# Patient Record
Sex: MALE | Race: OTHER | NOT HISPANIC OR LATINO | Employment: UNEMPLOYED | URBAN - METROPOLITAN AREA
[De-identification: names, ages, dates, MRNs, and addresses within clinical notes are randomized per-mention and may not be internally consistent; named-entity substitution may affect disease eponyms.]

---

## 2024-03-28 LAB
CHOLEST SERPL-MSCNC: 128 MG/DL (ref 0–200)
COMPLEXED PSA SERPL-MCNC: 0.7 NG/ML (ref 0–4)
HDLC SERPL-MCNC: 29 MG/DL (ref 35–70)
LDLC SERPL CALC-MCNC: 81 MG/DL (ref 0–160)
TRIGL SERPL-MCNC: 92 MG/DL (ref 40–160)

## 2024-05-06 ENCOUNTER — HOSPITAL ENCOUNTER (INPATIENT)
Facility: HOSPITAL | Age: 54
LOS: 9 days | Discharge: HOME | DRG: 843 | End: 2024-05-15
Attending: EMERGENCY MEDICINE | Admitting: INTERNAL MEDICINE
Payer: MEDICAID

## 2024-05-06 DIAGNOSIS — C80.1 SMALL CELL CARCINOMA: ICD-10-CM

## 2024-05-06 DIAGNOSIS — R49.0 HOARSENESS: ICD-10-CM

## 2024-05-06 DIAGNOSIS — R00.0 TACHYCARDIA: ICD-10-CM

## 2024-05-06 DIAGNOSIS — R06.02 SOB (SHORTNESS OF BREATH): ICD-10-CM

## 2024-05-06 DIAGNOSIS — R42 DIZZINESS: ICD-10-CM

## 2024-05-06 DIAGNOSIS — I87.1 SVC SYNDROME: Primary | ICD-10-CM

## 2024-05-06 DIAGNOSIS — R91.8 MASS OF RIGHT LUNG: ICD-10-CM

## 2024-05-06 LAB
ALBUMIN SERPL-MCNC: 3.1 G/DL (ref 3.5–5)
ALBUMIN/GLOB SERPL: 0.8 RATIO (ref 1.1–2)
ALP SERPL-CCNC: 120 UNIT/L (ref 40–150)
ALT SERPL-CCNC: 8 UNIT/L (ref 0–55)
AMPHET UR QL SCN: NEGATIVE
APTT PPP: 37.7 SECONDS (ref 23.2–33.7)
AST SERPL-CCNC: 13 UNIT/L (ref 5–34)
BARBITURATE SCN PRESENT UR: NEGATIVE
BASOPHILS # BLD AUTO: 0.05 X10(3)/MCL
BASOPHILS NFR BLD AUTO: 0.5 %
BENZODIAZ UR QL SCN: NEGATIVE
BILIRUB SERPL-MCNC: 0.3 MG/DL
BUN SERPL-MCNC: 9.9 MG/DL (ref 8.4–25.7)
CALCIUM SERPL-MCNC: 9.5 MG/DL (ref 8.4–10.2)
CANNABINOIDS UR QL SCN: NEGATIVE
CHLORIDE SERPL-SCNC: 101 MMOL/L (ref 98–107)
CO2 SERPL-SCNC: 25 MMOL/L (ref 22–29)
COCAINE UR QL SCN: NEGATIVE
CREAT SERPL-MCNC: 0.73 MG/DL (ref 0.73–1.18)
EOSINOPHIL # BLD AUTO: 0.03 X10(3)/MCL (ref 0–0.9)
EOSINOPHIL NFR BLD AUTO: 0.3 %
ERYTHROCYTE [DISTWIDTH] IN BLOOD BY AUTOMATED COUNT: 12.9 % (ref 11.5–17)
FENTANYL UR QL SCN: NEGATIVE
GFR SERPLBLD CREATININE-BSD FMLA CKD-EPI: >60 MLS/MIN/1.73/M2
GLOBULIN SER-MCNC: 4 GM/DL (ref 2.4–3.5)
GLUCOSE SERPL-MCNC: 119 MG/DL (ref 74–100)
HBV SURFACE AG SERPL QL IA: NONREACTIVE
HCT VFR BLD AUTO: 38.4 % (ref 42–52)
HGB BLD-MCNC: 12.7 G/DL (ref 14–18)
IMM GRANULOCYTES # BLD AUTO: 0.4 X10(3)/MCL (ref 0–0.04)
IMM GRANULOCYTES NFR BLD AUTO: 3.9 %
INR PPP: 1.1
LDH SERPL-CCNC: 326 U/L (ref 125–220)
LYMPHOCYTES # BLD AUTO: 0.72 X10(3)/MCL (ref 0.6–4.6)
LYMPHOCYTES NFR BLD AUTO: 7.1 %
MCH RBC QN AUTO: 31.8 PG (ref 27–31)
MCHC RBC AUTO-ENTMCNC: 33.1 G/DL (ref 33–36)
MCV RBC AUTO: 96 FL (ref 80–94)
MDMA UR QL SCN: NEGATIVE
MONOCYTES # BLD AUTO: 0.17 X10(3)/MCL (ref 0.1–1.3)
MONOCYTES NFR BLD AUTO: 1.7 %
NEUTROPHILS # BLD AUTO: 8.78 X10(3)/MCL (ref 2.1–9.2)
NEUTROPHILS NFR BLD AUTO: 86.5 %
NRBC BLD AUTO-RTO: 0 %
OPIATES UR QL SCN: NEGATIVE
PCP UR QL: NEGATIVE
PH UR: 6 [PH] (ref 3–11)
PLATELET # BLD AUTO: 324 X10(3)/MCL (ref 130–400)
PMV BLD AUTO: 10.1 FL (ref 7.4–10.4)
POTASSIUM SERPL-SCNC: 4.5 MMOL/L (ref 3.5–5.1)
PROT SERPL-MCNC: 7.1 GM/DL (ref 6.4–8.3)
PROTHROMBIN TIME: 13.6 SECONDS (ref 12.5–14.5)
RBC # BLD AUTO: 4 X10(6)/MCL (ref 4.7–6.1)
SODIUM SERPL-SCNC: 134 MMOL/L (ref 136–145)
SPECIFIC GRAVITY, URINE AUTO (.000) (OHS): >1.03 (ref 1–1.03)
URATE SERPL-MCNC: 6 MG/DL (ref 3.5–7.2)
WBC # SPEC AUTO: 10.15 X10(3)/MCL (ref 4.5–11.5)

## 2024-05-06 PROCEDURE — 99285 EMERGENCY DEPT VISIT HI MDM: CPT | Mod: 25

## 2024-05-06 PROCEDURE — 25500020 PHARM REV CODE 255: Performed by: INTERNAL MEDICINE

## 2024-05-06 PROCEDURE — 63600175 PHARM REV CODE 636 W HCPCS: Performed by: EMERGENCY MEDICINE

## 2024-05-06 PROCEDURE — 25000003 PHARM REV CODE 250: Performed by: EMERGENCY MEDICINE

## 2024-05-06 PROCEDURE — 85730 THROMBOPLASTIN TIME PARTIAL: CPT | Performed by: EMERGENCY MEDICINE

## 2024-05-06 PROCEDURE — 85025 COMPLETE CBC W/AUTO DIFF WBC: CPT | Performed by: EMERGENCY MEDICINE

## 2024-05-06 PROCEDURE — 84550 ASSAY OF BLOOD/URIC ACID: CPT | Performed by: INTERNAL MEDICINE

## 2024-05-06 PROCEDURE — 96374 THER/PROPH/DIAG INJ IV PUSH: CPT

## 2024-05-06 PROCEDURE — 80053 COMPREHEN METABOLIC PANEL: CPT | Performed by: EMERGENCY MEDICINE

## 2024-05-06 PROCEDURE — 87040 BLOOD CULTURE FOR BACTERIA: CPT | Performed by: EMERGENCY MEDICINE

## 2024-05-06 PROCEDURE — 20000000 HC ICU ROOM

## 2024-05-06 PROCEDURE — 87340 HEPATITIS B SURFACE AG IA: CPT | Performed by: INTERNAL MEDICINE

## 2024-05-06 PROCEDURE — 80307 DRUG TEST PRSMV CHEM ANLYZR: CPT | Performed by: EMERGENCY MEDICINE

## 2024-05-06 PROCEDURE — 99223 1ST HOSP IP/OBS HIGH 75: CPT | Mod: ,,, | Performed by: INTERNAL MEDICINE

## 2024-05-06 PROCEDURE — 85610 PROTHROMBIN TIME: CPT | Performed by: EMERGENCY MEDICINE

## 2024-05-06 PROCEDURE — 83615 LACTATE (LD) (LDH) ENZYME: CPT | Performed by: INTERNAL MEDICINE

## 2024-05-06 RX ORDER — HEPARIN SODIUM,PORCINE/D5W 25000/250
0-40 INTRAVENOUS SOLUTION INTRAVENOUS CONTINUOUS
Status: DISCONTINUED | OUTPATIENT
Start: 2024-05-06 | End: 2024-05-09

## 2024-05-06 RX ORDER — HEPARIN SODIUM,PORCINE/D5W 25000/250
0-40 INTRAVENOUS SOLUTION INTRAVENOUS CONTINUOUS
Status: DISCONTINUED | OUTPATIENT
Start: 2024-05-06 | End: 2024-05-06

## 2024-05-06 RX ORDER — DEXAMETHASONE SODIUM PHOSPHATE 4 MG/ML
4 INJECTION, SOLUTION INTRA-ARTICULAR; INTRALESIONAL; INTRAMUSCULAR; INTRAVENOUS; SOFT TISSUE EVERY 6 HOURS
Status: DISCONTINUED | OUTPATIENT
Start: 2024-05-06 | End: 2024-05-09

## 2024-05-06 RX ORDER — SODIUM CHLORIDE 0.9 % (FLUSH) 0.9 %
10 SYRINGE (ML) INJECTION
Status: DISCONTINUED | OUTPATIENT
Start: 2024-05-06 | End: 2024-05-15 | Stop reason: HOSPADM

## 2024-05-06 RX ORDER — LORAZEPAM 2 MG/ML
2 INJECTION INTRAMUSCULAR
Status: DISCONTINUED | OUTPATIENT
Start: 2024-05-06 | End: 2024-05-06

## 2024-05-06 RX ORDER — THIAMINE HCL 100 MG
100 TABLET ORAL 3 TIMES DAILY
Status: DISCONTINUED | OUTPATIENT
Start: 2024-05-10 | End: 2024-05-15 | Stop reason: HOSPADM

## 2024-05-06 RX ORDER — LORAZEPAM 1 MG/1
2 TABLET ORAL
Status: DISCONTINUED | OUTPATIENT
Start: 2024-05-06 | End: 2024-05-15 | Stop reason: HOSPADM

## 2024-05-06 RX ORDER — DEXAMETHASONE SODIUM PHOSPHATE 4 MG/ML
10 INJECTION, SOLUTION INTRA-ARTICULAR; INTRALESIONAL; INTRAMUSCULAR; INTRAVENOUS; SOFT TISSUE
Status: COMPLETED | OUTPATIENT
Start: 2024-05-06 | End: 2024-05-06

## 2024-05-06 RX ADMIN — DEXAMETHASONE SODIUM PHOSPHATE 4 MG: 4 INJECTION, SOLUTION INTRA-ARTICULAR; INTRALESIONAL; INTRAMUSCULAR; INTRAVENOUS; SOFT TISSUE at 11:05

## 2024-05-06 RX ADMIN — AZITHROMYCIN MONOHYDRATE 500 MG: 500 INJECTION, POWDER, LYOPHILIZED, FOR SOLUTION INTRAVENOUS at 07:05

## 2024-05-06 RX ADMIN — IOHEXOL 100 ML: 350 INJECTION, SOLUTION INTRAVENOUS at 07:05

## 2024-05-06 RX ADMIN — DEXAMETHASONE SODIUM PHOSPHATE 10 MG: 4 INJECTION, SOLUTION INTRA-ARTICULAR; INTRALESIONAL; INTRAMUSCULAR; INTRAVENOUS; SOFT TISSUE at 05:05

## 2024-05-06 RX ADMIN — HEPARIN SODIUM 12 UNITS/KG/HR: 10000 INJECTION, SOLUTION INTRAVENOUS at 07:05

## 2024-05-06 RX ADMIN — CEFTRIAXONE SODIUM 1 G: 1 INJECTION, POWDER, FOR SOLUTION INTRAMUSCULAR; INTRAVENOUS at 07:05

## 2024-05-06 RX ADMIN — FOLIC ACID: 5 INJECTION, SOLUTION INTRAMUSCULAR; INTRAVENOUS; SUBCUTANEOUS at 07:05

## 2024-05-06 NOTE — Clinical Note
Diagnosis: SVC syndrome [194050]  Future Attending Provider: JET APARICIO [[id number]]  Admit to which facility:: [facility] [[id number]]  Reason for IP Medical Treatment  (Clinical interventions that can only be accomplish ed in the IP setting? ) :: steroids, bronch, heparin  Estimated Length of Stay:: 3-4 midnights  I certify that Inpatient services for greater than or equal to 2 midnights are medically necessary:: Yes  Plans for Post-Acute care--if anticipated (pick  the single best option):: A. No post acute care anticipated at this time

## 2024-05-06 NOTE — H&P
[facility] - Emergency Dept  Pulmonary Critical Care Note    Patient Name: Adán Dinero  MRN: [id number]  Admission Date: 5/6/2024  Hospital Length of Stay: 0 days  Code Status: Full Code  Attending Provider: Rob Spence MD  Primary Care Provider: No primary care provider on file.     Subjective:     HPI:   54-year-old male with no significant past medical history presents to or [facility] ED complaining of neck swelling and change in voice.  Patient was incidentally found to have right lung mass with concern for SVC syndrome on a CT scan 3 weeks ago, he was scheduled to follow up on the resulting 2 days, 2 days ago he started noticing swelling in his neck and significant change in his voice, his also been having some difficulty swallowing solids, but no problems with liquids.  He has been having shortness of breath chronically but no acute change, has been having some dizziness.  Otherwise denies chest pain, cough, worsening shortness of breath, diarrhea, constipation, dysuria, abdominal pain.  In the ED he was afebrile, vital signs stable, satting 94% on room air, labs including CBC and CMP normal. Hematology and Oncology was consulted who recommended admitting the patient to the ICU due to high-risk for decompensation in the setting SVC syndrome.    Hospital Course/Significant events:  05/06/2024 admission to the ICU for observation    24 Hour Interval History:  N/A    Past Medical History:   Diagnosis Date    Lung mass        Past Surgical History:   Procedure Laterality Date    CATARACT EXTRACTION W/  INTRAOCULAR LENS IMPLANT Left 06/28/2023    Procedure: EXTRACTION, CATARACT, WITH IOL INSERTION;  Surgeon: Fritz Cornejo MD;  Location: [facility];  Service: Ophthalmology;  Laterality: Left;    HERNIA REPAIR         Social History     Socioeconomic History    Marital status: Single   Tobacco Use    Smoking status: Every Day     Current packs/day: 0.25     Types: Cigarettes    Smokeless  tobacco: Former   Substance and Sexual Activity    Alcohol use: Not Currently           Objective:     Current Outpatient Medications   Medication Instructions    acetaminophen (TYLENOL) 650 mg, Oral, As needed (PRN)    ketorolac 0.4% (ACULAR) 0.4 % Drop 1 drop, Left Eye, 4 times daily    moxifloxacin (VIGAMOX) 0.5 % ophthalmic solution 1 drop, Left Eye, 3 times daily    prednisoLONE acetate (PRED FORTE) 1 % DrpS 1 drop, Left Eye, 4 times daily       Current Inpatient Medications  Current Facility-Administered Medications   Medication Dose Route Frequency    azithromycin  500 mg Intravenous ED 1 Time    cefTRIAXone (Rocephin) IV (PEDS and ADULTS)  1 g Intravenous ED 1 Time    dexAMETHasone  4 mg Intravenous Q6H    sodium chloride 0.9% 1,000 mL with mvi, (ADULT) no.4 with vit K 3,300 unit- 150 mcg/10 mL 10 mL, thiamine 100 mg, folic acid 1 mg infusion   Intravenous Once         No intake or output data in the 24 hours ending 05/06/24 1827    Review of Systems   Constitutional:  Negative for chills and fever.   Eyes:  Positive for double vision.   Respiratory:  Positive for shortness of breath. Negative for cough and hemoptysis.    Cardiovascular:  Negative for chest pain and leg swelling.   Gastrointestinal:  Negative for abdominal pain, constipation, diarrhea, nausea and vomiting.   Genitourinary:  Negative for dysuria and urgency.   Neurological:  Positive for dizziness.        Vital Signs (Most Recent):  Temp: 98.4 °F (36.9 °C) (05/06/24 1611)  Pulse: 92 (05/06/24 1611)  Resp: 18 (05/06/24 1611)  BP: 134/89 (05/06/24 1611)  SpO2: 99 % (05/06/24 1611)  Body mass index is 24.21 kg/m².  Weight: 68 kg (150 lb) Vital Signs (24h Range):  Temp:  [98.4 °F (36.9 °C)] 98.4 °F (36.9 °C)  Pulse:  [92] 92  Resp:  [18] 18  SpO2:  [99 %] 99 %  BP: (134)/(89) 134/89     Physical Exam  Constitutional:       Appearance: Normal appearance.   HENT:      Head:      Comments: Bilateral neck swelling with periorbital edema  Muffled  "voice  Cardiovascular:      Rate and Rhythm: Normal rate and regular rhythm.      Pulses: Normal pulses.      Heart sounds: Normal heart sounds.   Pulmonary:      Effort: Pulmonary effort is normal.      Breath sounds: Wheezing present.   Abdominal:      General: Abdomen is flat.      Palpations: Abdomen is soft.   Musculoskeletal:         General: Normal range of motion.   Skin:     General: Skin is warm and dry.      Capillary Refill: Capillary refill takes less than 2 seconds.   Neurological:      Mental Status: He is alert and oriented to person, place, and time.           Mechanical ventilation support:       Lines/Drains/Airways       Peripheral Intravenous Line  Duration                  Peripheral IV - Single Lumen 05/06/24 1625 20 G Anterior;Left Forearm <1 day                    Significant Labs:    Lab Results   Component Value Date    WBC 10.15 05/06/2024    HGB 12.7 (L) 05/06/2024    HCT 38.4 (L) 05/06/2024    MCV 96.0 (H) 05/06/2024     05/06/2024         BMP  Lab Results   Component Value Date     (L) 05/06/2024    K 4.5 05/06/2024     06/12/2023    CO2 25 05/06/2024    BUN 9.9 05/06/2024    CREATININE 0.73 05/06/2024    CALCIUM 9.5 05/06/2024    ANIONGAP 8 06/12/2023       ABG  No results for input(s): "PH", "PO2", "PCO2", "HCO3", "BE" in the last 168 hours.        Significant Imaging:  I have reviewed all pertinent imaging within the past 24 hours.        Assessment/Plan:     Assessment  SVC syndrome  Dysphagia  Hoarseness  Neck and facial swelling  Right upper lung mass      Plan  Start dexamethasone 4 mg q.6  Start heparin gtt  Continue rocephin and azithromycin for 3 days until cultures are negative  Possible bronchoscopy with biopsy tomorrow, NPO midnight  MRI brain and CT AP for evaluation for metastasis  CIWA protocol, folic acid and thiamine  Hematology and vascular are consulted, appreciate assistance  Consult speech therapy, in the meantime full liquid diet      DVT " Prophylaxis:  Heparin drip  GI Prophylaxis:  None          Wilmer Coughlin MD  Pulmonary Critical Care Medicine  [facility] - Emergency Dept

## 2024-05-06 NOTE — ED NOTES
Pt arrives by [facility], transferred from [facility] [place] for swelling to L side of neck and change in speech. Pt sent for pulmonology/oncology services for Dx. superior vena cava syndrome per EMS. Pt is AAOx4, no distress, reports swelling to L neck, muffled speech, headache, throat pressure. Pt sitting up in bed, breathing even and unlabored, skin warm/pink, handles secretions, behavior appropriate There are diminished breath sounds to L side, throughout. A 20g IV is noted to L forearm, flush & locked. Pt placed on monitors, VSS, awaiting provider, pt in gown, call bell in reach, provided with a urinal.

## 2024-05-06 NOTE — CONSULTS
[facility] - Oncology Acute  Hematology/Oncology  Consult Note    Patient Name: Adán Dinero  MRN: [id number]  Admission Date: 5/6/2024  Hospital Length of Stay: 0 days  Attending Provider: Lucia Pompa MD  Consulting Provider: Carlos Cohen MD  Principal Problem:<principal problem not specified>    Inpatient consult to Oncology  Consult performed by: Carlos Cohen MD  Consult ordered by: Lucia Pompa MD        Subjective:     HPI:Pt arrives by [facility], transferred from [facility] for swelling to L side of neck and change in speech. Pt sent for pulmonology/oncology services for Dx. superior vena cava syndrome per EMS. reports swelling to L neck, muffled speech, headache, throat pressure.      He was seen in the ED and a CT of the head which was normal, CT soft tissue of the neck which showed generalized edema throughout the soft tissue he would right jugular vein engorgement in the CT showed a large mass in the right lung apex right hilum mediastinum 11 cm mass causes obstruction of the SVC with thrombus or tumor thrombus extending into the SVC at the level of the brachiocephalic vein and the mass encases the right pulmonary artery right hilum partially obstructing right upper lobe, there is a postobstructive pneumonia in the right upper lobe and involvement of the brachial plexus adjacent subclavian artery with mass in the right lung apex.  And a right-sided pleural effusion          Medications:  Continuous Infusions:  Current Facility-Administered Medications   Medication Dose Route Frequency Last Rate Last Admin     Scheduled Meds:  Current Facility-Administered Medications   Medication Dose Route Frequency    dexAMETHasone  10 mg Intravenous ED 1 Time    dexAMETHasone  4 mg Intravenous Q6H     PRN Meds:     Review of patient's allergies indicates:  No Known Allergies     Past Medical History:   Diagnosis Date    Lung mass      Past Surgical History:   Procedure  Laterality Date    CATARACT EXTRACTION W/  INTRAOCULAR LENS IMPLANT Left 06/28/2023    Procedure: EXTRACTION, CATARACT, WITH IOL INSERTION;  Surgeon: Fritz Cornejo MD;  Location: [facility];  Service: Ophthalmology;  Laterality: Left;    HERNIA REPAIR       Family History    None       Social history   smoker,   alcohol,   Tugboat worker,    Review of Systems   Constitutional:  Positive for activity change, appetite change and fatigue. Negative for fever and unexpected weight change.   HENT:  Positive for congestion, ear pain, facial swelling, hearing loss and sinus pressure.    Eyes:  Positive for visual disturbance.   Respiratory:  Positive for chest tightness and shortness of breath.    Cardiovascular: Negative.    Gastrointestinal: Negative.    Endocrine: Negative.    Genitourinary: Negative.    Musculoskeletal: Negative.    Skin: Negative.    Neurological:  Positive for dizziness, speech difficulty, light-headedness, numbness and headaches. Negative for tremors, seizures, syncope and weakness.   Hematological: Negative.    Psychiatric/Behavioral: Negative.       Objective:     Vital Signs (Most Recent):  Temp: 98.4 °F (36.9 °C) (05/06/24 1611)  Pulse: 92 (05/06/24 1611)  Resp: 18 (05/06/24 1611)  BP: 134/89 (05/06/24 1611)  SpO2: 99 % (05/06/24 1611) Vital Signs (24h Range):  Temp:  [98.4 °F (36.9 °C)] 98.4 °F (36.9 °C)  Pulse:  [92] 92  Resp:  [18] 18  SpO2:  [99 %] 99 %  BP: (134)/(89) 134/89     Weight: 68 kg (150 lb)  Body mass index is 24.21 kg/m².  Body surface area is 1.78 meters squared.    No intake or output data in the 24 hours ending 05/06/24 1746    Physical Exam  Vitals reviewed.   Constitutional:       General: He is not in acute distress.  HENT:      Mouth/Throat:      Mouth: Mucous membranes are moist.      Pharynx: Oropharynx is clear.   Eyes:      Extraocular Movements: Extraocular movements intact.      Conjunctiva/sclera: Conjunctivae normal.      Pupils: Pupils are equal, round, and  reactive to light.   Neck:      Comments: Left-sided neck vein swelling facial swelling  Cardiovascular:      Rate and Rhythm: Normal rate and regular rhythm.      Pulses: Normal pulses.      Heart sounds: Normal heart sounds.   Pulmonary:      Effort: Pulmonary effort is normal. No accessory muscle usage.      Breath sounds: Decreased air movement present. Examination of the right-middle field reveals decreased breath sounds. Decreased breath sounds present.   Chest:      Comments: Dilated veins, erythema  Abdominal:      General: Abdomen is flat. Bowel sounds are normal. There is no distension.      Palpations: Abdomen is soft.      Comments: Old hernia repair scar   Musculoskeletal:         General: Normal range of motion.   Lymphadenopathy:      Cervical: Cervical adenopathy present.      Upper Body:      Right upper body: No axillary adenopathy.      Left upper body: No axillary adenopathy.      Comments: Questionable some mild shotty inguinal adenopathy   Skin:     General: Skin is warm and dry.      Comments: Multiple tattoos   Neurological:      General: No focal deficit present.      Mental Status: He is alert and oriented to person, place, and time. Mental status is at baseline.      GCS: GCS eye subscore is 4. GCS verbal subscore is 5. GCS motor subscore is 6.      Cranial Nerves: Cranial nerves 2-12 are intact. No cranial nerve deficit.      Sensory: Sensation is intact.      Motor: No weakness.      Coordination: Coordination is intact.      Comments: He does have some mild numbness in his left hand.       Psychiatric:         Attention and Perception: Attention normal.         Mood and Affect: Mood normal.         Speech: Speech normal.         Behavior: Behavior normal.         Thought Content: Thought content normal.         Significant Labs:   Lab Review:  CBC:   Recent Labs     05/06/24  1716   WBC 10.15   RBC 4.00*   HGB 12.7*   HCT 38.4*           Diagnostic Results:  I have reviewed all  pertinent imaging results/findings within the past 24 hours.          Assessment/Plan:   SVC syndrome,   tumor thrombus  Lung Mass-  Abnormal LN  Pleural effusion  Postobstructive pneumonia  Tobacco use  Alcohol use      I discussed his case with the ER physician.    I explained to the patient the CT findings and the concern for malignancy.    We discussed the risk of bleeding.    We discussed the risk of withdrawal with his alcohol use   We discussed the need for tissue biopsy.    Has a risk of thrombotic events and closure of the SVC.  He was having difficulty swallowing as well      REC  MRI of the brain  NPO until procedures  Banana bag  IV heparin -low intensity with  mild hemoptysis and need for procedures-- Pharmacy to dose--discus pharmacy. No bolus, low intensity  Dexamethasone 10 mg IV x1 then 4 mg IV q.6    ICU pulmonary evaluation   Agree with vascular referral   Careful of DTs   Stat Biopsy with Pulmonary  Will consider Rad once once tissue obtain            Thank you for your consult.     Carlos Cohen MD  Hematology/Oncology  [facility] - Oncology Acute

## 2024-05-06 NOTE — ED PROVIDER NOTES
Encounter Date: 5/6/2024       History     Chief Complaint   Patient presents with    transfer     Transfer from [facility] for  oncology and pulmonology.     55 yo male transferred from [facility] for higher level of care. Patient reports facial swelling, difficulty swallowing and hoarseness since yesterday evening. He reports he has been have weakness and dizziness as well. He went to ED a few weeks ago for hemoptysis and was diagnosed with a lung mass. He has a follow up appt for this on 5/15.     Today in the ED, patient had CT head which was normal. CT soft tissue neck which showed generalized edema throughout soft tissue of neck with no ovious mass. Right jugular vein is enlarged compared to left. CT chest with contrast shows large mass in right lung apex, right hilum and mediastinum which is very enlarged measuring 11 cm craniocadually. Mass causes obstruction of SVC with thrombus or tumor thrombus extending into SVC at level of brachiocephalic veins. Mass encases right pulmonary artery and right hilum, partially obstructing right upper lobe pulmonary artery. Postobstructive pneumonia in right upper lobe with mass. Possible involvement of brachial plexus adjacent to subclavian artery with mass at right lung apex. Moderate right side pleural effusion.           The history is provided by the patient.     Review of patient's allergies indicates:  No Known Allergies  Past Medical History:   Diagnosis Date    Lung mass      Past Surgical History:   Procedure Laterality Date    CATARACT EXTRACTION W/  INTRAOCULAR LENS IMPLANT Left 06/28/2023    Procedure: EXTRACTION, CATARACT, WITH IOL INSERTION;  Surgeon: Fritz Cornejo MD;  Location: [facility];  Service: Ophthalmology;  Laterality: Left;    HERNIA REPAIR       No family history on file.  Social History     Tobacco Use    Smoking status: Every Day     Current packs/day: 0.25     Types: Cigarettes    Smokeless tobacco: Former   Substance Use Topics     Alcohol use: Yes     Comment: 1/2 pint of whiskey daily     Review of Systems   Constitutional:  Positive for fatigue. Negative for fever.   HENT:  Positive for trouble swallowing.    Respiratory:  Positive for shortness of breath.    Neurological:  Positive for dizziness and weakness.       Physical Exam     Initial Vitals [05/06/24 1611]   BP Pulse Resp Temp SpO2   134/89 92 18 98.4 °F (36.9 °C) 99 %      MAP       --         Physical Exam    Nursing note and vitals reviewed.  Constitutional: He appears well-developed and well-nourished. He is not diaphoretic. He does not appear ill. No distress.   HENT:   Head: Normocephalic.   Left Ear: External ear normal.   Nose: Nose normal.   Right periorbital swelling, right ear swelling, right neck swelling    Eyes: Conjunctivae and EOM are normal. Pupils are equal, round, and reactive to light.   Neck: Neck supple. No tracheal deviation present.   Mild engorgement of neck/chest veins    Cardiovascular:  Normal rate, regular rhythm, normal heart sounds and intact distal pulses.           Pulmonary/Chest: No stridor. He has no rhonchi. He has no rales.   Abdominal: Abdomen is soft. He exhibits no distension. There is no abdominal tenderness.   Musculoskeletal:         General: Normal range of motion.      Cervical back: Neck supple.     Neurological: He is alert and oriented to person, place, and time. He has normal strength. GCS score is 15. GCS eye subscore is 4. GCS verbal subscore is 5. GCS motor subscore is 6.   Skin: Skin is warm and dry. Capillary refill takes less than 2 seconds. No pallor.   Psychiatric: He has a normal mood and affect. His mood appears not anxious.         ED Course   Procedures  Labs Reviewed   APTT - Abnormal; Notable for the following components:       Result Value    PTT 37.7 (*)     All other components within normal limits   COMPREHENSIVE METABOLIC PANEL - Abnormal; Notable for the following components:    Sodium Level 134 (*)     Glucose  Level 119 (*)     Albumin Level 3.1 (*)     Globulin 4.0 (*)     Albumin/Globulin Ratio 0.8 (*)     All other components within normal limits   CBC WITH DIFFERENTIAL - Abnormal; Notable for the following components:    RBC 4.00 (*)     Hgb 12.7 (*)     Hct 38.4 (*)     MCV 96.0 (*)     MCH 31.8 (*)     IG# 0.40 (*)     All other components within normal limits   LACTATE DEHYDROGENASE - Abnormal; Notable for the following components:    Lactate Dehydrogenase 326 (*)     All other components within normal limits   PROTIME-INR - Normal   HEPATITIS B SURFACE ANTIGEN - Normal   DRUG SCREEN, URINE (BEAKER) - Normal    Narrative:     Cut off concentrations:    Amphetamines - 1000 ng/ml  Barbiturates - 200 ng/ml  Benzodiazepine - 200 ng/ml  Cannabinoids (THC) - 50 ng/ml  Cocaine - 300 ng/ml  Fentanyl - 1.0 ng/ml  MDMA - 500 ng/ml  Opiates - 300 ng/ml   Phencyclidine (PCP) - 25 ng/ml    Specimen submitted for drug analysis and tested for pH and specific gravity in order to evaluate sample integrity. Suspect tampering if specific gravity is <1.003 and/or pH is not within the range of 4.5 - 8.0  False negatives may result form substances such as bleach added to urine.  False positives may result for the presence of a substance with similar chemical structure to the drug or its metabolite.    This test provides only a PRELIMINARY analytical test result. A more specific alternate chemical method must be used in order to obtain a confirmed analytical result. Gas chromatography/mass spectrometry (GC/MS) is the preferred confirmatory method. Other chemical confirmation methods are available. Clinical consideration and professional judgement should be applied to any drug of abuse test result, particularly when preliminary positive results are used.    Positive results will be confirmed only at the physicians request. Unconfirmed screening results are to be used only for medical purposes (treatment).        URIC ACID - Normal   BLOOD  CULTURE OLG   BLOOD CULTURE OLG   CBC W/ AUTO DIFFERENTIAL    Narrative:     The following orders were created for panel order CBC auto differential.  Procedure                               Abnormality         Status                     ---------                               -----------         ------                     CBC with Differential[[id number]]        Abnormal            Final result                 Please view results for these tests on the individual orders.          Imaging Results              CT Abdomen Pelvis With IV Contrast NO Oral Contrast (Final result)  Result time 05/06/24 20:16:01      Final result by Mark Pang MD (05/06/24 20:16:01)                   Impression:      1. Nonspecific hypodense lesion is identified in the liver may represent metastatic disease.  2. Other secondary findings as noted.      Electronically signed by: Mark Pang MD  Date:    05/06/2024  Time:    20:16               Narrative:    EXAMINATION:  CT ABDOMEN PELVIS WITH IV CONTRAST    CLINICAL HISTORY:  Metastatic disease evaluation;    TECHNIQUE:  Multiple cross-sectional images were obtained from the lung bases the pubic symphysis after the intravenous administration of contrast.  Coronal and sagittal reformatted images were obtained.  An automated dose exposure technique was utilized this limits radiation does the patient.    FINDINGS:  Right-sided effusion which is moderate with compressive atelectatic changes.  Dependent atelectatic changes left lung base.  Traction bronchiectasis lung bases.  Heart size within normal limits.    The liver demonstrates a circumscribed hypodense lesion in segment IV which measures 9 mm.  This is best identified on image number 36 of series 2.  Spleen is borderline enlarged.  Nodular appearance of the adrenal glands without evidence for overt nodule.  Kidneys are symmetric in size with nonobstructing calculus involving the midpole of the left kidney.  Pancreas is normal.    Small  bowel is of normal caliber.  Colon is also normal caliber with scattered colonic diverticula.  Moderate to large stool burden is identified throughout the colon.  Normal appendix.    Bladder and prostate are grossly normal.  No free fluid in the abdomen pelvis.  The course and caliber of the abdominal is normal with scattered calcified atheromatous disease.  No evidence for adenopathy.    No suspicious osseous lesions.  Scattered spondylotic changes are identified.  Postsurgical changes identified from left inguinal hernia repair.  The remaining soft tissues are grossly normal.                                       Medications   dexAMETHasone injection 4 mg (4 mg Intravenous Given 5/6/24 2308)   heparin 25,000 units in dextrose 5% 250 mL (100 units/mL) infusion LOW INTENSITY nomogram - LAF (12 Units/kg/hr × 65.5 kg (Adjusted) Intravenous New Bag 5/6/24 1924)   heparin 25,000 units in dextrose 5% (100 units/ml) IV bolus from bag LOW INTENSITY nomogram - LAF (has no administration in time range)   heparin 25,000 units in dextrose 5% (100 units/ml) IV bolus from bag LOW INTENSITY nomogram - LAF (has no administration in time range)   sodium chloride 0.9% flush 10 mL (has no administration in time range)   folic acid 1 mg in dextrose 5 % (D5W) 100 mL IVPB (has no administration in time range)   thiamine (B-1) 250 mg in dextrose 5 % (D5W) 100 mL IVPB (has no administration in time range)     Followed by   thiamine tablet 100 mg (has no administration in time range)   LORazepam tablet 2 mg (has no administration in time range)   cefTRIAXone (ROCEPHIN) 2 g in dextrose 5 % in water (D5W) 100 mL IVPB (MB+) (has no administration in time range)   azithromycin 500 mg in dextrose 5 % 250 mL IVPB (ready to mix) (has no administration in time range)   dexAMETHasone injection 10 mg (10 mg Intravenous Given 5/6/24 1379)   sodium chloride 0.9% 1,000 mL with mvi, (ADULT) no.4 with vit K 3,300 unit- 150 mcg/10 mL 10 mL, thiamine 100  mg, folic acid 1 mg infusion ( Intravenous New Bag 5/6/24 1916)   azithromycin 500 mg in dextrose 5 % 250 mL IVPB (ready to mix) (500 mg Intravenous New Bag 5/6/24 1953)   cefTRIAXone (Rocephin) 1 g in dextrose 5 % in water (D5W) 100 mL IVPB (MB+) (1 g Intravenous New Bag 5/6/24 1917)   iohexoL (OMNIPAQUE 350) injection 100 mL (100 mLs Intravenous Given 5/6/24 1940)     Medical Decision Making  Spoke with CT surgery who recommended consult with peripheral vascular and oncology  Spoke with Oncology who recs IV steroids, ICU admission with bronch/biopsy, heparin  Spoke with peripheral vascular who will see in consult   Admitted to ICU     Problems Addressed:  Dizziness: acute illness or injury that poses a threat to life or bodily functions  Hoarseness: acute illness or injury that poses a threat to life or bodily functions  Mass of right lung: acute illness or injury that poses a threat to life or bodily functions  SVC syndrome: acute illness or injury that poses a threat to life or bodily functions    Amount and/or Complexity of Data Reviewed  Labs: ordered. Decision-making details documented in ED Course.  Radiology: ordered. Decision-making details documented in ED Course.    Risk  Prescription drug management.  Decision regarding hospitalization.               ED Course as of 05/06/24 2346   Mon May 06, 2024   1717 Hematology oncology paged; Carlos Cohen MD. [DW]   1726 Onc consult: spoke with Dr Ley who recs Decadron 10 mg now then 4 mg q6h, ICU eval and emergent [facility] with biopsy, vascular eval  [KM]   1728 Peripheral Vascular paged; Mark Mendoza MD. [DW]   1729 ICU paged. [DW]   1749 Spoke with ICU resident who will evaluate patient  [KM]   1804 Peripheral vascular consult: spoke with Dr Moffett who will see patient in consult  [KM]      ED Course User Index  [DW] Shin Quigley  [KM] Lucia Pompa MD                             Clinical Impression:  Final diagnoses:  [I87.1] SVC syndrome  (Primary)  [R91.8] Mass of right lung  [R42] Dizziness  [R49.0] Hoarseness          ED Disposition Condition    Admit Lucia Steele MD  05/06/24 0442

## 2024-05-07 LAB
ALBUMIN SERPL-MCNC: 3 G/DL (ref 3.5–5)
ALBUMIN/GLOB SERPL: 0.8 RATIO (ref 1.1–2)
ALP SERPL-CCNC: 110 UNIT/L (ref 40–150)
ALT SERPL-CCNC: 6 UNIT/L (ref 0–55)
APTT PPP: 37.8 SECONDS (ref 23.2–33.7)
APTT PPP: 41.4 SECONDS (ref 23.2–33.7)
APTT PPP: 43.9 SECONDS (ref 23.2–33.7)
AST SERPL-CCNC: 11 UNIT/L (ref 5–34)
BASOPHILS # BLD AUTO: 0.03 X10(3)/MCL
BASOPHILS NFR BLD AUTO: 0.4 %
BILIRUB SERPL-MCNC: 0.2 MG/DL
BUN SERPL-MCNC: 10 MG/DL (ref 8.4–25.7)
CALCIUM SERPL-MCNC: 9.2 MG/DL (ref 8.4–10.2)
CHLORIDE SERPL-SCNC: 102 MMOL/L (ref 98–107)
CO2 SERPL-SCNC: 23 MMOL/L (ref 22–29)
CREAT SERPL-MCNC: 0.65 MG/DL (ref 0.73–1.18)
EOSINOPHIL # BLD AUTO: 0.01 X10(3)/MCL (ref 0–0.9)
EOSINOPHIL NFR BLD AUTO: 0.1 %
ERYTHROCYTE [DISTWIDTH] IN BLOOD BY AUTOMATED COUNT: 12.8 % (ref 11.5–17)
GFR SERPLBLD CREATININE-BSD FMLA CKD-EPI: >60 MLS/MIN/1.73/M2
GLOBULIN SER-MCNC: 3.8 GM/DL (ref 2.4–3.5)
GLUCOSE SERPL-MCNC: 155 MG/DL (ref 74–100)
HBV CORE AB SERPL QL IA: NONREACTIVE
HCT VFR BLD AUTO: 38.2 % (ref 42–52)
HCV AB SERPL QL IA: NONREACTIVE
HGB BLD-MCNC: 12.5 G/DL (ref 14–18)
HIV 1+2 AB+HIV1 P24 AG SERPL QL IA: NONREACTIVE
IMM GRANULOCYTES # BLD AUTO: 0.34 X10(3)/MCL (ref 0–0.04)
IMM GRANULOCYTES NFR BLD AUTO: 4.3 %
LYMPHOCYTES # BLD AUTO: 1.18 X10(3)/MCL (ref 0.6–4.6)
LYMPHOCYTES NFR BLD AUTO: 15.1 %
MCH RBC QN AUTO: 31.8 PG (ref 27–31)
MCHC RBC AUTO-ENTMCNC: 32.7 G/DL (ref 33–36)
MCV RBC AUTO: 97.2 FL (ref 80–94)
MONOCYTES # BLD AUTO: 0.31 X10(3)/MCL (ref 0.1–1.3)
MONOCYTES NFR BLD AUTO: 4 %
NEUTROPHILS # BLD AUTO: 5.96 X10(3)/MCL (ref 2.1–9.2)
NEUTROPHILS NFR BLD AUTO: 76.1 %
NRBC BLD AUTO-RTO: 0 %
PLATELET # BLD AUTO: 305 X10(3)/MCL (ref 130–400)
PMV BLD AUTO: 9.7 FL (ref 7.4–10.4)
POTASSIUM SERPL-SCNC: 4.2 MMOL/L (ref 3.5–5.1)
PROT SERPL-MCNC: 6.8 GM/DL (ref 6.4–8.3)
RBC # BLD AUTO: 3.93 X10(6)/MCL (ref 4.7–6.1)
SODIUM SERPL-SCNC: 132 MMOL/L (ref 136–145)
WBC # SPEC AUTO: 7.83 X10(3)/MCL (ref 4.5–11.5)

## 2024-05-07 PROCEDURE — 25000003 PHARM REV CODE 250

## 2024-05-07 PROCEDURE — 27000221 HC OXYGEN, UP TO 24 HOURS

## 2024-05-07 PROCEDURE — 99232 SBSQ HOSP IP/OBS MODERATE 35: CPT | Mod: ,,, | Performed by: INTERNAL MEDICINE

## 2024-05-07 PROCEDURE — 87389 HIV-1 AG W/HIV-1&-2 AB AG IA: CPT | Performed by: EMERGENCY MEDICINE

## 2024-05-07 PROCEDURE — 63600175 PHARM REV CODE 636 W HCPCS

## 2024-05-07 PROCEDURE — 25000003 PHARM REV CODE 250: Performed by: INTERNAL MEDICINE

## 2024-05-07 PROCEDURE — 36415 COLL VENOUS BLD VENIPUNCTURE: CPT | Performed by: EMERGENCY MEDICINE

## 2024-05-07 PROCEDURE — A9698 NON-RAD CONTRAST MATERIALNOC: HCPCS | Performed by: INTERNAL MEDICINE

## 2024-05-07 PROCEDURE — 86704 HEP B CORE ANTIBODY TOTAL: CPT | Performed by: EMERGENCY MEDICINE

## 2024-05-07 PROCEDURE — 25000003 PHARM REV CODE 250: Performed by: STUDENT IN AN ORGANIZED HEALTH CARE EDUCATION/TRAINING PROGRAM

## 2024-05-07 PROCEDURE — 92611 MOTION FLUOROSCOPY/SWALLOW: CPT

## 2024-05-07 PROCEDURE — 25500020 PHARM REV CODE 255: Performed by: INTERNAL MEDICINE

## 2024-05-07 PROCEDURE — 86803 HEPATITIS C AB TEST: CPT | Performed by: EMERGENCY MEDICINE

## 2024-05-07 PROCEDURE — 85025 COMPLETE CBC W/AUTO DIFF WBC: CPT

## 2024-05-07 PROCEDURE — 85730 THROMBOPLASTIN TIME PARTIAL: CPT | Performed by: INTERNAL MEDICINE

## 2024-05-07 PROCEDURE — 36415 COLL VENOUS BLD VENIPUNCTURE: CPT | Performed by: INTERNAL MEDICINE

## 2024-05-07 PROCEDURE — 63600175 PHARM REV CODE 636 W HCPCS: Performed by: EMERGENCY MEDICINE

## 2024-05-07 PROCEDURE — A9577 INJ MULTIHANCE: HCPCS | Performed by: INTERNAL MEDICINE

## 2024-05-07 PROCEDURE — 92610 EVALUATE SWALLOWING FUNCTION: CPT

## 2024-05-07 PROCEDURE — 20000000 HC ICU ROOM

## 2024-05-07 PROCEDURE — 80053 COMPREHEN METABOLIC PANEL: CPT

## 2024-05-07 RX ORDER — OXYCODONE AND ACETAMINOPHEN 5; 325 MG/1; MG/1
1 TABLET ORAL EVERY 6 HOURS PRN
Status: DISCONTINUED | OUTPATIENT
Start: 2024-05-07 | End: 2024-05-15 | Stop reason: HOSPADM

## 2024-05-07 RX ORDER — MUPIROCIN 20 MG/G
OINTMENT TOPICAL 2 TIMES DAILY
Status: DISPENSED | OUTPATIENT
Start: 2024-05-07 | End: 2024-05-12

## 2024-05-07 RX ORDER — ACETAMINOPHEN 325 MG/1
650 TABLET ORAL EVERY 6 HOURS PRN
Status: DISCONTINUED | OUTPATIENT
Start: 2024-05-07 | End: 2024-05-15 | Stop reason: HOSPADM

## 2024-05-07 RX ORDER — OXYCODONE AND ACETAMINOPHEN 10; 325 MG/1; MG/1
1 TABLET ORAL EVERY 6 HOURS PRN
Status: DISCONTINUED | OUTPATIENT
Start: 2024-05-07 | End: 2024-05-15 | Stop reason: HOSPADM

## 2024-05-07 RX ADMIN — DEXAMETHASONE SODIUM PHOSPHATE 4 MG: 4 INJECTION, SOLUTION INTRA-ARTICULAR; INTRALESIONAL; INTRAMUSCULAR; INTRAVENOUS; SOFT TISSUE at 10:05

## 2024-05-07 RX ADMIN — DEXAMETHASONE SODIUM PHOSPHATE 4 MG: 4 INJECTION, SOLUTION INTRA-ARTICULAR; INTRALESIONAL; INTRAMUSCULAR; INTRAVENOUS; SOFT TISSUE at 04:05

## 2024-05-07 RX ADMIN — HEPARIN SODIUM 18 UNITS/KG/HR: 10000 INJECTION, SOLUTION INTRAVENOUS at 05:05

## 2024-05-07 RX ADMIN — CEFTRIAXONE SODIUM 2 G: 2 INJECTION, POWDER, FOR SOLUTION INTRAMUSCULAR; INTRAVENOUS at 02:05

## 2024-05-07 RX ADMIN — BARIUM SULFATE 20 ML: 0.81 POWDER, FOR SUSPENSION ORAL at 02:05

## 2024-05-07 RX ADMIN — THIAMINE HYDROCHLORIDE 250 MG: 100 INJECTION, SOLUTION INTRAMUSCULAR; INTRAVENOUS at 08:05

## 2024-05-07 RX ADMIN — OXYCODONE AND ACETAMINOPHEN 1 TABLET: 10; 325 TABLET ORAL at 05:05

## 2024-05-07 RX ADMIN — FOLIC ACID 1 MG: 5 INJECTION, SOLUTION INTRAMUSCULAR; INTRAVENOUS; SUBCUTANEOUS at 08:05

## 2024-05-07 RX ADMIN — AZITHROMYCIN MONOHYDRATE 500 MG: 500 INJECTION, POWDER, LYOPHILIZED, FOR SOLUTION INTRAVENOUS at 04:05

## 2024-05-07 RX ADMIN — MUPIROCIN: 20 OINTMENT TOPICAL at 08:05

## 2024-05-07 RX ADMIN — GADOBENATE DIMEGLUMINE 15 ML: 529 INJECTION, SOLUTION INTRAVENOUS at 05:05

## 2024-05-07 RX ADMIN — DEXAMETHASONE SODIUM PHOSPHATE 4 MG: 4 INJECTION, SOLUTION INTRA-ARTICULAR; INTRALESIONAL; INTRAMUSCULAR; INTRAVENOUS; SOFT TISSUE at 11:05

## 2024-05-07 NOTE — PLAN OF CARE
"   05/07/24 1520   Discharge Assessment   Assessment Type Discharge Planning Assessment   Confirmed/corrected address, phone number and insurance Yes   Confirmed Demographics Contacted registration to update   Source of Information patient   Communicated WIL with patient/caregiver Date not available/Unable to determine   Reason For Admission Lung Mass   People in Home friend(s)   Facility Arrived From: [facility]   Do you expect to return to your current living situation? Yes   Do you have help at home or someone to help you manage your care at home? Yes   Who are your caregiver(s) and their phone number(s)? friendDavid ([phone]   Prior to hospitilization cognitive status: Alert/Oriented   Current cognitive status: Alert/Oriented   Walking or Climbing Stairs Difficulty no   Dressing/Bathing Difficulty no   Equipment Currently Used at Home none   Patient currently being followed by outpatient case management? No   Do you currently have service(s) that help you manage your care at home? No   Do you take prescription medications? Yes   Do you have prescription coverage? Yes   Coverage medicaid   Who is going to help you get home at discharge? friend or uber   How do you get to doctors appointments? family or friend will provide   Are you on dialysis? No   Do you take coumadin? No   Discharge Plan A Home   Discharge Plan B Other  (to be determined)   DME Needed Upon Discharge  none   Discharge Plan discussed with: Patient   Transition of Care Barriers None   OTHER   Name(s) of People in Home friendDavid     Patient lives with friend . He does not drive. He states he was ambulating without device and independent. Gave patient "Rethinking Drinking" packet and encouraged patient to review and ask questions if he has any.  "

## 2024-05-07 NOTE — PT/OT/SLP EVAL
[facility]  Speech Language Pathology Department  Clinical Swallow Evaluation    Patient Name:  Adán Dinero   MRN:  [id number]    Recommendations     General recommendations:  Modified Barium Swallow Study  Diet texture/consistency recommendations:  NPO  Medications: crushed in puree  Precautions: Standard, aspiration    History     Adán Dinero is a/n 54 y.o. male admitted for swelling to L side of neck and change in speech.  Pt with R lung mass and concern for SVC syndrome.    Past Medical History:   Diagnosis Date    Lung mass      Past Surgical History:   Procedure Laterality Date    CATARACT EXTRACTION W/  INTRAOCULAR LENS IMPLANT Left 06/28/2023    Procedure: EXTRACTION, CATARACT, WITH IOL INSERTION;  Surgeon: Fritz Cornejo MD;  Location: [facility];  Service: Ophthalmology;  Laterality: Left;    HERNIA REPAIR         Home diet texture/consistency: Regular and thin liquids  Current method of nutrition: NPO    Patient complaint: to eat/drink    Pt reports some difficulty swallowing and experiencing voice changes however these have improved since yesterday.    Discussed with intensivist, bronch scheduled for tomorrow.    Imaging   No results found for this or any previous visit.    No results found for this or any previous visit.    No results found for this or any previous visit.    Subjective     Patient awake, alert, calm, and cooperative.    Patient goals: to eat/drink   Spiritual/Cultural/Hindu Beliefs/Practices that affect care: no    Pain/Comfort: Pain Rating 1: 0/10  Respiratory status: nasal cannula    Objective     ORAL MUSCULATURE  Dentition: missing teeth  Secretion Management: adequate  Mucosal Quality: good  Facial Movement: WFL  Buccal Strength & Mobility: WFL  Mandibular Strength & Mobility: WFL  Oral Labial Strength & Mobility: WFL  Lingual Strength & Mobility: WFL  Vocal Quality: adequate  Volitional Cough: Able to clear  secretions      Consistency Fed By Oral Symptoms Pharyngeal Symptoms   Ice chips SLP None Reduced laryngeal movement to palpation   Puree SLP None Reduced laryngeal movement to palpation     Assessment     Pt with signs/sx of aspiration noted during evaluation.  Rec: NPO, will proceed with MBS to assess current swallow function.    Goals     Multidisciplinary Problems       SLP Goals       Not on file                  Education     Patient provided with verbal education regarding POC.  Understanding was verbalized.    Plan     SLP Follow-Up:      Patient to be seen:      Plan of Care expires:     Plan of Care reviewed with:  patient     Time Tracking     SLP Treatment Date:   05/07/24  Speech Start Time:  1310  Speech Stop Time:  1320     Speech Total Time (min):  10 min    Billable minutes:  Swallow and Oral Function Evaluation, 10 minutes     05/07/2024

## 2024-05-07 NOTE — PROGRESS NOTES
[facility] - Oncology Acute  Hematology/Oncology  Consult Note    Patient Name: Adán Dinero  MRN: [id number]  Admission Date: 5/6/2024  Hospital Length of Stay: 1 days  Attending Provider: Rob Spence MD  Consulting Provider: Carlos Cohen MD  Principal Problem:<principal problem not specified>    Consults  Subjective:     HPI:Pt arrives by [facility], transferred from [facility] for swelling to L side of neck and change in speech. Pt sent for pulmonology/oncology services for Dx. superior vena cava syndrome per EMS. reports swelling to L neck, muffled speech, headache, throat pressure.      He was seen in the ED and a CT of the head which was normal, CT soft tissue of the neck which showed generalized edema throughout the soft tissue he would right jugular vein engorgement in the CT showed a large mass in the right lung apex right hilum mediastinum 11 cm mass causes obstruction of the SVC with thrombus or tumor thrombus extending into the SVC at the level of the brachiocephalic vein and the mass encases the right pulmonary artery right hilum partially obstructing right upper lobe, there is a postobstructive pneumonia in the right upper lobe and involvement of the brachial plexus adjacent subclavian artery with mass in the right lung apex.  And a right-sided pleural effusion    Today 05/07/2024: Patient lying in ICU comfortably.  He still has some headache and dizziness on turning his neck.  But he states it is better.  No hemoptysis no bleeding today.  Good urine output, still feels little short of breath.  Hep B surface antigen negative.  Urine drug screen negative      CT of the abdomen pelvis yesterday with questionable liver metastases as well      Medications:  Continuous Infusions:   heparin (porcine) in D5W  0-40 Units/kg/hr (Adjusted) Intravenous Continuous 9.8 mL/hr at 05/07/24 0356 15 Units/kg/hr at 05/07/24 0356     Scheduled Meds:   azithromycin  500 mg Intravenous  Q24H    cefTRIAXone (Rocephin) IV (PEDS and ADULTS)  2 g Intravenous Q24H    dexAMETHasone  4 mg Intravenous Q6H    folic acid (FOLVITE) IVPB  1 mg Intravenous Daily    thiamine (B-1) 250 mg in dextrose 5 % (D5W) 100 mL IVPB  250 mg Intravenous Daily    Followed by    [START ON 5/10/2024] thiamine  100 mg Oral TID     PRN Meds:  Current Facility-Administered Medications:     heparin (PORCINE), 60 Units/kg (Adjusted), Intravenous, PRN    heparin (PORCINE), 30 Units/kg (Adjusted), Intravenous, PRN    LORazepam, 2 mg, Oral, Q2H PRN    sodium chloride 0.9%, 10 mL, Intravenous, PRN     Review of patient's allergies indicates:  No Known Allergies     Past Medical History:   Diagnosis Date    Lung mass      Past Surgical History:   Procedure Laterality Date    CATARACT EXTRACTION W/  INTRAOCULAR LENS IMPLANT Left 06/28/2023    Procedure: EXTRACTION, CATARACT, WITH IOL INSERTION;  Surgeon: Fritz Cornejo MD;  Location: [facility];  Service: Ophthalmology;  Laterality: Left;    HERNIA REPAIR       Family History    None       Social history   smoker,   alcohol,   Tugboat worker,    Review of Systems   Constitutional:  Positive for activity change, appetite change and fatigue. Negative for fever and unexpected weight change.   HENT:  Positive for congestion, ear pain, facial swelling, hearing loss and sinus pressure.    Eyes:  Positive for visual disturbance.   Respiratory:  Positive for chest tightness and shortness of breath.    Cardiovascular: Negative.    Gastrointestinal: Negative.    Endocrine: Negative.    Genitourinary: Negative.    Musculoskeletal: Negative.    Skin: Negative.    Neurological:  Positive for dizziness, speech difficulty, light-headedness, numbness and headaches. Negative for tremors, seizures, syncope and weakness.   Hematological: Negative.    Psychiatric/Behavioral: Negative.       Objective:     Vital Signs (Most Recent):  Temp: 97.9 °F (36.6 °C) (05/07/24 0400)  Pulse: 77 (05/07/24 0600)  Resp:  20 (05/07/24 0600)  BP: 132/79 (05/07/24 0600)  SpO2: 98 % (05/07/24 0600) Vital Signs (24h Range):  Temp:  [97.9 °F (36.6 °C)-98.6 °F (37 °C)] 97.9 °F (36.6 °C)  Pulse:  [] 77  Resp:  [12-21] 20  SpO2:  [93 %-99 %] 98 %  BP: (111-146)/(78-93) 132/79     Weight: 68 kg (150 lb)  Body mass index is 24.21 kg/m².  Body surface area is 1.78 meters squared.      Intake/Output Summary (Last 24 hours) at 5/7/2024 0706  Last data filed at 5/7/2024 0638  Gross per 24 hour   Intake 2380.25 ml   Output 1350 ml   Net 1030.25 ml       Physical Exam  Vitals reviewed.   Constitutional:       General: He is not in acute distress.  HENT:      Mouth/Throat:      Mouth: Mucous membranes are moist.      Pharynx: Oropharynx is clear.   Eyes:      Extraocular Movements: Extraocular movements intact.      Conjunctiva/sclera: Conjunctivae normal.      Pupils: Pupils are equal, round, and reactive to light.   Neck:      Comments: Left-sided neck vein swelling facial swelling  Cardiovascular:      Rate and Rhythm: Normal rate and regular rhythm.      Pulses: Normal pulses.      Heart sounds: Normal heart sounds.   Pulmonary:      Effort: Pulmonary effort is normal. No accessory muscle usage.      Breath sounds: Decreased air movement present. Examination of the right-middle field reveals decreased breath sounds. Decreased breath sounds present.   Chest:      Comments: Dilated veins, erythema  Abdominal:      General: Abdomen is flat. Bowel sounds are normal. There is no distension.      Palpations: Abdomen is soft.      Comments: Old hernia repair scar   Musculoskeletal:         General: Normal range of motion.   Lymphadenopathy:      Cervical: Cervical adenopathy present.      Upper Body:      Right upper body: No axillary adenopathy.      Left upper body: No axillary adenopathy.      Comments: Questionable some mild shotty inguinal adenopathy   Skin:     General: Skin is warm and dry.      Comments: Multiple tattoos   Neurological:       General: No focal deficit present.      Mental Status: He is alert and oriented to person, place, and time. Mental status is at baseline.      GCS: GCS eye subscore is 4. GCS verbal subscore is 5. GCS motor subscore is 6.      Cranial Nerves: Cranial nerves 2-12 are intact. No cranial nerve deficit.      Sensory: Sensation is intact.      Motor: No weakness.      Coordination: Coordination is intact.      Comments: He does have some mild numbness in his left hand.       Psychiatric:         Attention and Perception: Attention normal.         Mood and Affect: Mood normal.         Speech: Speech normal.         Behavior: Behavior normal.         Thought Content: Thought content normal.         Significant Labs:     Lab Review:  CBC:   Recent Labs     05/07/24  0153 05/06/24  1716   WBC 7.83 10.15   RBC 3.93* 4.00*   HGB 12.5* 12.7*   HCT 38.2* 38.4*    324     CMP:   Recent Labs     05/07/24  0153 05/06/24  1716   * 134*   K 4.2 4.5   CO2 23 25   BUN 10.0 9.9   CREATININE 0.65* 0.73   CALCIUM 9.2 9.5   LABPROT 6.8 7.1   ALBUMIN 3.0* 3.1*   BILITOT 0.2 0.3   ALKPHOS 110 120   AST 11 13   ALT 6 8        Diagnostic Results:  I have reviewed all pertinent imaging results/findings within the past 24 hours.          Assessment/Plan:   SVC syndrome,   tumor thrombus  Lung Mass-  Abnormal LN  Pleural effusion  Postobstructive pneumonia  Tobacco use  Alcohol use  Abnormal CT of the liver        I discussed his case with the ER physician.    I explained to the patient the CT findings and the concern for malignancy.    We discussed the risk of bleeding.    We discussed the risk of withdrawal with his alcohol use   We discussed the need for tissue biopsy.    Has a risk of thrombotic events and closure of the SVC.  He was having difficulty swallowing as well      REC  MRI of the brain--pending  NPO until procedures  Banana bag  IV heparin -low intensity with  mild hemoptysis and need for procedures-- Pharmacy to  dose--discus pharmacy. No bolus, low intensity------after procedure consider switching to Lovenox  Dexamethasone 4 mg IV q.6   Agree with vascular referral   Careful of DTs    Biopsy with Pulmonary  Will need Social work to arrange treatment at Home after d/c   Oncology outpatient         Carlos Cohen MD  Hematology/Oncology  [facility] - Oncology [facility]

## 2024-05-07 NOTE — PROCEDURES
[facility]  Speech Language Pathology Department  Modified Barium Swallow (MBS) Study    Patient Name:  Adán Dinero   MRN:  [id number]    Recommendations     General recommendations:  SLP follow up x1 regarding diet tolerance  Diet texture/consistency recommendations:  Regular solids (IDDSI 7) and thin liquids (IDDSI 0)  Medications: per patient preference  Swallow strategies/precautions: alternate solids/liquids and upright for PO intake  General precautions: Standard, aspiration    History     Adán Dinero is a/n 54 y.o. male admitted for swelling to L side of neck and change in speech.  Pt with R lung mass and concern for SVC syndrome.   Past Medical History:   Diagnosis Date    Lung mass      Past Surgical History:   Procedure Laterality Date    CATARACT EXTRACTION W/  INTRAOCULAR LENS IMPLANT Left 06/28/2023    Procedure: EXTRACTION, CATARACT, WITH IOL INSERTION;  Surgeon: Fritz Cornejo MD;  Location: [facility];  Service: Ophthalmology;  Laterality: Left;    HERNIA REPAIR       A MBS Study was completed at the bedside to assess the efficiency of his swallow function, rule out aspiration and make recommendations regarding safe dietary consistencies, effective compensatory strategies, and safe eating environment.     Home diet texture/consistency: Regular and thin liquids  Current Method of Nutrition: NPO    Patient complaint: to eat/drink    Imaging   No results found for this or any previous visit.    No results found for this or any previous visit.    No results found for this or any previous visit.    Subjective     Patient awake, alert, calm, and cooperative.    Spiritual/Cultural/Yazidi Beliefs/Practices that affect care: no  Pain/Comfort: Pain Rating 1: 0/10    Respiratory Status: nasal cannula  Restraints/positioning devices: none    Fluoroscopic Findings     Oral Musculature  Dentition: missing teeth  Secretion Management: adequate  Mucosal Quality:  good  Facial Movement: WFL  Buccal Strength & Mobility: WFL  Mandibular Strength & Mobility: WFL  Oral Labial Strength & Mobility: WFL  Lingual Strength & Mobility: WFL  Vocal Quality: adequate  Volitional Cough: Able to clear secretions    Setup  Upright in bed  Able to self feed  Adequate head control    Visualization  Lateral view  Possible cervical osteophytes    Oral Phase:   Adequate lip closure  Adequate mastication    Pharyngeal Phase:   Reduced base of tongue retraction  Reduced hyolaryngeal excursion  Consistency Fed by Laryngeal Penetration Aspiration Residue   Mildly thick liquid by spoon SLP None None Mild  Valleculae and Pyriform sinus  Reduced with additional swallow   Puree Self None None Mild  Base of tongue and Valleculae   Chewable solid Self None None Moderate  Valleculae  Cleared with additional swallow and liquid wash   Thin liquid by cup Self None None Mild  Valleculae and Pyriform sinus  Reduced with additional swallow   Thin liquid by straw Self None None Mild  Pyriform sinus  Reduced with additional swallow       Assessment     Oropharyngeal swallow WFL with no laryngeal penetration or aspiration visualized during this study.  Mild-moderate oropharyngeal residue noted, increased with chewable solids which was reduced with an additional swallow and liquid wash.  Possible cervical osteophytes visualized which may impact bolus transport.    Patient appears to be at low risk for aspiration related pneumonia when considering complicated medical status.     Goals     Multidisciplinary Problems       SLP Goals       Not on file                  Education     Patient provided with verbal education and handouts regarding results.  Understanding was verbalized.    Plan     SLP Follow-Up:  Yes    Patient to be seen:      Plan of Care expires:     Plan of Care reviewed with:  patient     Time Tracking     SLP Treatment Date:   05/07/24  Speech Start Time:  1405  Speech Stop Time:  1430     Speech Total  Time (min):  25 min    Billable minutes:   Motion Fluoroscopic Evaluation, Video Recording, 25 minutes     05/07/2024

## 2024-05-07 NOTE — NURSING
Nurses Note -- 4 Eyes      5/7/2024   3:41 PM      Skin assessed during: Daily Assessment      [x] No Altered Skin Integrity Present    [x]Prevention Measures Documented      [] Yes- Altered Skin Integrity Present or Discovered   [] LDA Added if Not in Epic (Describe Wound)   [] New Altered Skin Integrity was Present on Admit and Documented in LDA   [] Wound Image Taken    Wound Care Consulted? No    Attending Nurse:  Johana White RN/Staff Member:  BABITA Go

## 2024-05-07 NOTE — CONSULTS
Vascular Surgery Consult      05/07/2024   Location:[facility]   Inpatient consult to Peripheral Vascular Surgery  Consult performed by: Reva Garcia FNP  Consult ordered by: Lucia Pompa MD        Chief complaint: Evaluate SVC syndrome     HPI: Mr. Dinero is a 54 year old male whom was evaluated in outlying facility approximately 1 month ago with hemoptysis and was found to have a large mass in the right lung. He was awaiting establishment of care with Oncologist at home when he began experiencing swelling of the right neck and face. He presented to [facility] on yesterday for further evaluation of his swelling with reports of difficulty speaking secondary to the swelling. He was transferred to [facility] for further evaluation and specialty consultation. Chest X-rays and  CTA imaging obtained revealing a large mass in the right lung apex causing obstruction of the SVC with thrombus extending into the SVC. The patient was admitted to the ICU on heparin drip with Vascular Surgery consultation. Today, the patient is doing well. He reports his swelling has significantly improved since admission. He denies any difficulty swallowing.Oncology consulted with plans for bronchoscopy and biopsy today.   HISTORY REVIEW  chart review and the patient    Past Medical and Surgical History  Allergies :   Patient has no known allergies.    Prior to Admission medications    Medication Sig Start Date End Date Taking? Authorizing Provider   acetaminophen (TYLENOL) 650 MG TbSR Take 650 mg by mouth as needed.    Provider, Historical   ketorolac 0.4% (ACULAR) 0.4 % Drop Place 1 drop into the left eye 4 (four) times daily.    Provider, Historical   moxifloxacin (VIGAMOX) 0.5 % ophthalmic solution Place 1 drop into the left eye 3 (three) times daily.    Provider, Historical   prednisoLONE acetate (PRED FORTE) 1 % DrpS Place 1 drop into the left eye 4 (four) times daily.     Provider, Historical      Medical :   He has a past medical history of Lung mass.    Surgical :   He has a past surgical history that includes Cataract extraction w/  intraocular lens implant (Left, 06/28/2023) and Hernia repair.     Family History  His family history is not on file.    Social History  He reports that he has been smoking cigarettes. He has quit using smokeless tobacco. He reports current alcohol use.     Review of Systems   Constitutional: Negative.    HENT: Negative.     Eyes: Negative.    Respiratory: Negative.     Cardiovascular: Negative.    Gastrointestinal: Negative.    Endocrine: Negative.    Genitourinary: Negative.    Musculoskeletal: Negative.    Skin: Negative.    Allergic/Immunologic: Negative.    Neurological: Negative.    Hematological: Negative.    Psychiatric/Behavioral: Negative.          Objective   Physical Exam  Constitutional:       Appearance: Normal appearance. He is normal weight.   HENT:      Head: Normocephalic.      Nose: Nose normal.      Mouth/Throat:      Mouth: Mucous membranes are moist.      Pharynx: Oropharynx is clear.   Eyes:      Conjunctiva/sclera: Conjunctivae normal.   Cardiovascular:      Rate and Rhythm: Normal rate and regular rhythm.      Pulses: Normal pulses.           Carotid pulses are 2+ on the right side and 2+ on the left side.       Femoral pulses are 2+ on the right side and 2+ on the left side.       Dorsalis pedis pulses are 2+ on the right side and 2+ on the left side.      Heart sounds: Normal heart sounds.   Pulmonary:      Effort: Pulmonary effort is normal.      Breath sounds: Normal breath sounds.   Abdominal:      General: Bowel sounds are normal. There is no distension.      Palpations: Abdomen is soft. There is no mass.   Musculoskeletal:         General: Normal range of motion.      Cervical back: Normal range of motion and neck supple.      Right lower leg: No edema.      Left lower leg: No edema.   Skin:     General: Skin is warm and  "dry.   Neurological:      General: No focal deficit present.      Mental Status: He is alert and oriented to person, place, and time. Mental status is at baseline.   Psychiatric:         Mood and Affect: Mood normal.         Behavior: Behavior normal.       VITAL SIGNS: 24 HR MIN & MAX LAST    Temp  Min: 97.9 °F (36.6 °C)  Max: 98.6 °F (37 °C)  97.9 °F (36.6 °C)        BP  Min: 111/78  Max: 146/93  123/76     Pulse  Min: 77  Max: 104  85     Resp  Min: 0  Max: 21  (!) 9    SpO2  Min: 93 %  Max: 99 %  97 %      HT: 5' 6" (167.6 cm)  WT: 68 kg (150 lb)  BMI: 24.2     Current Facility-Administered Medications:     azithromycin 500 mg in dextrose 5 % 250 mL IVPB (ready to mix), 500 mg, Intravenous, Q24H, Wilmer Coughlin MD    cefTRIAXone (ROCEPHIN) 2 g in dextrose 5 % in water (D5W) 100 mL IVPB (MB+), 2 g, Intravenous, Q24H, Wilmer Coughlin MD    dexAMETHasone injection 4 mg, 4 mg, Intravenous, Q6H, Lucia Pompa MD, 4 mg at 05/07/24 0446    folic acid 1 mg in dextrose 5 % (D5W) 100 mL IVPB, 1 mg, Intravenous, Daily, Wilmer Coughlin MD, Stopped at 05/07/24 0908    heparin 25,000 units in dextrose 5% (100 units/ml) IV bolus from bag LOW INTENSITY nomogram - LAF, 60 Units/kg (Adjusted), Intravenous, PRN, Lucia Pompa MD, 3,930 Units at 05/07/24 0338    heparin 25,000 units in dextrose 5% (100 units/ml) IV bolus from bag LOW INTENSITY nomogram - LAF, 30 Units/kg (Adjusted), Intravenous, PRN, Lucia Pompa MD    heparin 25,000 units in dextrose 5% 250 mL (100 units/mL) infusion LOW INTENSITY nomogram - LAF, 0-40 Units/kg/hr (Adjusted), Intravenous, Continuous, Lucia Pompa MD, Last Rate: 9.8 mL/hr at 05/07/24 0356, 15 Units/kg/hr at 05/07/24 0356    LORazepam tablet 2 mg, 2 mg, Oral, Q2H PRN, Rob Spence MD    sodium chloride 0.9% flush 10 mL, 10 mL, Intravenous, PRN, Wilmer Coughlin MD    thiamine (B-1) 250 mg in dextrose 5 % (D5W) 100 mL IVPB, 250 mg, Intravenous, Daily, Stopped at 05/07/24 0913 **FOLLOWED " BY** [START ON 5/10/2024] thiamine tablet 100 mg, 100 mg, Oral, TID, Wilmer Coughlin MD  CT Abdomen Pelvis With IV Contrast NO Oral Contrast   Final Result      1. Nonspecific hypodense lesion is identified in the liver may represent metastatic disease.   2. Other secondary findings as noted.         Electronically signed by: Mark Pang MD   Date:    05/06/2024   Time:    20:16      Xray Previous   Final Result      CT Previous   Final Result      CT Previous   Final Result      CT Previous   Final Result      CT Previous   Final Result      MRI Brain W WO Contrast    (Results Pending)           Assessment & Plan   There are no hospital problems to display for this patient.    Adán Dinero is a 54 y.o. male admitted to the ICU with a large lung mass which is compressing the superior vena cava. Apparently the patient had some right neck and facial swelling on yesterday although there is no significant edema noted on exam today. [facility] Dr. Delatorre which reveals flow through the right upper extremity into the superior vena cava. There is obvious compression of the SVC seen on imaging although there is no occlusion noted. He is on a heparin drip at this time. Given the patient is stable without any significant edema, there is no indication for venogram and stenting at this time. We recommend conservative treatment with medical management including anticoagulation. Vascular surgery will sign off. Please re consult with any change in clinical vascular condition.    The plan of care was discussed and reviewed with Dr.LaGraize Reva Garcia  The patient has a mediastinal mass compressing the SVC.  Clinically he does not have facial swelling today.  I reviewed the CTA.  There is flow in the SVC, although it is severely narrowed.  I recommend getting a tissue diagnosis.  He will need chemo/radiation assuming this is a malignancy.  I would reserve SVC stenting  only if he has severe facial swelling despite medical management.  We will sign off.  Please re consult for any questions/problems

## 2024-05-07 NOTE — NURSING
Nurses Note -- 4 Eyes      5/7/2024   5:02 AM      Skin assessed during: Daily Assessment      [x] No Altered Skin Integrity Present    [x]Prevention Measures Documented      [] Yes- Altered Skin Integrity Present or Discovered   [] LDA Added if Not in Epic (Describe Wound)   [] New Altered Skin Integrity was Present on Admit and Documented in LDA   [] Wound Image Taken    Wound Care Consulted? No    Attending Nurse:  Jane White RN/Staff Member:  Meena JC

## 2024-05-08 ENCOUNTER — ANESTHESIA EVENT (OUTPATIENT)
Dept: ENDOSCOPY | Facility: HOSPITAL | Age: 54
DRG: 843 | End: 2024-05-08
Payer: MEDICAID

## 2024-05-08 ENCOUNTER — ANESTHESIA (OUTPATIENT)
Dept: ENDOSCOPY | Facility: HOSPITAL | Age: 54
DRG: 843 | End: 2024-05-08
Payer: MEDICAID

## 2024-05-08 LAB
ALBUMIN SERPL-MCNC: 3.1 G/DL (ref 3.5–5)
ALBUMIN/GLOB SERPL: 0.8 RATIO (ref 1.1–2)
ALP SERPL-CCNC: 100 UNIT/L (ref 40–150)
ALT SERPL-CCNC: 7 UNIT/L (ref 0–55)
APTT PPP: 32.6 SECONDS (ref 23.2–33.7)
APTT PPP: 34.8 SECONDS (ref 23.2–33.7)
AST SERPL-CCNC: 11 UNIT/L (ref 5–34)
BASOPHILS # BLD AUTO: 0.02 X10(3)/MCL
BASOPHILS NFR BLD AUTO: 0.2 %
BILIRUB SERPL-MCNC: 0.2 MG/DL
BUN SERPL-MCNC: 13.3 MG/DL (ref 8.4–25.7)
CALCIUM SERPL-MCNC: 9.7 MG/DL (ref 8.4–10.2)
CHLORIDE SERPL-SCNC: 99 MMOL/L (ref 98–107)
CO2 SERPL-SCNC: 27 MMOL/L (ref 22–29)
CREAT SERPL-MCNC: 0.71 MG/DL (ref 0.73–1.18)
EOSINOPHIL # BLD AUTO: 0 X10(3)/MCL (ref 0–0.9)
EOSINOPHIL NFR BLD AUTO: 0 %
ERYTHROCYTE [DISTWIDTH] IN BLOOD BY AUTOMATED COUNT: 12.7 % (ref 11.5–17)
GFR SERPLBLD CREATININE-BSD FMLA CKD-EPI: >60 ML/MIN/1.73/M2
GLOBULIN SER-MCNC: 3.7 GM/DL (ref 2.4–3.5)
GLUCOSE SERPL-MCNC: 106 MG/DL (ref 74–100)
HCT VFR BLD AUTO: 39.2 % (ref 42–52)
HGB BLD-MCNC: 12.7 G/DL (ref 14–18)
IMM GRANULOCYTES # BLD AUTO: 0.24 X10(3)/MCL (ref 0–0.04)
IMM GRANULOCYTES NFR BLD AUTO: 2 %
LYMPHOCYTES # BLD AUTO: 0.89 X10(3)/MCL (ref 0.6–4.6)
LYMPHOCYTES NFR BLD AUTO: 7.3 %
MCH RBC QN AUTO: 31.7 PG (ref 27–31)
MCHC RBC AUTO-ENTMCNC: 32.4 G/DL (ref 33–36)
MCV RBC AUTO: 97.8 FL (ref 80–94)
MONOCYTES # BLD AUTO: 1.02 X10(3)/MCL (ref 0.1–1.3)
MONOCYTES NFR BLD AUTO: 8.3 %
NEUTROPHILS # BLD AUTO: 10.07 X10(3)/MCL (ref 2.1–9.2)
NEUTROPHILS NFR BLD AUTO: 82.2 %
NRBC BLD AUTO-RTO: 0 %
OHS QRS DURATION: 84 MS
OHS QTC CALCULATION: 430 MS
PLATELET # BLD AUTO: 302 X10(3)/MCL (ref 130–400)
PMV BLD AUTO: 10.5 FL (ref 7.4–10.4)
POTASSIUM SERPL-SCNC: 4.1 MMOL/L (ref 3.5–5.1)
PROT SERPL-MCNC: 6.8 GM/DL (ref 6.4–8.3)
RBC # BLD AUTO: 4.01 X10(6)/MCL (ref 4.7–6.1)
SODIUM SERPL-SCNC: 134 MMOL/L (ref 136–145)
WBC # SPEC AUTO: 12.24 X10(3)/MCL (ref 4.5–11.5)

## 2024-05-08 PROCEDURE — 85730 THROMBOPLASTIN TIME PARTIAL: CPT | Performed by: INTERNAL MEDICINE

## 2024-05-08 PROCEDURE — C1726 CATH, BAL DIL, NON-VASCULAR: HCPCS | Performed by: INTERNAL MEDICINE

## 2024-05-08 PROCEDURE — 27000221 HC OXYGEN, UP TO 24 HOURS

## 2024-05-08 PROCEDURE — 88305 TISSUE EXAM BY PATHOLOGIST: CPT | Performed by: INTERNAL MEDICINE

## 2024-05-08 PROCEDURE — 88172 CYTP DX EVAL FNA 1ST EA SITE: CPT

## 2024-05-08 PROCEDURE — 07978ZX DRAINAGE OF THORAX LYMPHATIC, VIA NATURAL OR ARTIFICIAL OPENING ENDOSCOPIC APPROACH, DIAGNOSTIC: ICD-10-PCS | Performed by: INTERNAL MEDICINE

## 2024-05-08 PROCEDURE — 36415 COLL VENOUS BLD VENIPUNCTURE: CPT | Performed by: INTERNAL MEDICINE

## 2024-05-08 PROCEDURE — 25000003 PHARM REV CODE 250

## 2024-05-08 PROCEDURE — 88185 FLOWCYTOMETRY/TC ADD-ON: CPT

## 2024-05-08 PROCEDURE — 93010 ELECTROCARDIOGRAM REPORT: CPT | Mod: ,,, | Performed by: INTERNAL MEDICINE

## 2024-05-08 PROCEDURE — 36415 COLL VENOUS BLD VENIPUNCTURE: CPT

## 2024-05-08 PROCEDURE — 94760 N-INVAS EAR/PLS OXIMETRY 1: CPT

## 2024-05-08 PROCEDURE — 25000003 PHARM REV CODE 250: Performed by: INTERNAL MEDICINE

## 2024-05-08 PROCEDURE — 37000009 HC ANESTHESIA EA ADD 15 MINS: Performed by: INTERNAL MEDICINE

## 2024-05-08 PROCEDURE — 63600175 PHARM REV CODE 636 W HCPCS: Performed by: EMERGENCY MEDICINE

## 2024-05-08 PROCEDURE — 99900025 HC BRONCHOSCOPY-ASST (STAT)

## 2024-05-08 PROCEDURE — D9220A PRA ANESTHESIA: Mod: ANES,,, | Performed by: ANESTHESIOLOGY

## 2024-05-08 PROCEDURE — 11000001 HC ACUTE MED/SURG PRIVATE ROOM

## 2024-05-08 PROCEDURE — 88342 IMHCHEM/IMCYTCHM 1ST ANTB: CPT

## 2024-05-08 PROCEDURE — 88341 IMHCHEM/IMCYTCHM EA ADD ANTB: CPT

## 2024-05-08 PROCEDURE — 99900035 HC TECH TIME PER 15 MIN (STAT)

## 2024-05-08 PROCEDURE — 37000008 HC ANESTHESIA 1ST 15 MINUTES: Performed by: INTERNAL MEDICINE

## 2024-05-08 PROCEDURE — 93005 ELECTROCARDIOGRAM TRACING: CPT

## 2024-05-08 PROCEDURE — 27201423 OPTIME MED/SURG SUP & DEVICES STERILE SUPPLY: Performed by: INTERNAL MEDICINE

## 2024-05-08 PROCEDURE — 63600175 PHARM REV CODE 636 W HCPCS

## 2024-05-08 PROCEDURE — 25000003 PHARM REV CODE 250: Performed by: STUDENT IN AN ORGANIZED HEALTH CARE EDUCATION/TRAINING PROGRAM

## 2024-05-08 PROCEDURE — 85025 COMPLETE CBC W/AUTO DIFF WBC: CPT

## 2024-05-08 PROCEDURE — 88173 CYTOPATH EVAL FNA REPORT: CPT

## 2024-05-08 PROCEDURE — 80053 COMPREHEN METABOLIC PANEL: CPT

## 2024-05-08 PROCEDURE — 99232 SBSQ HOSP IP/OBS MODERATE 35: CPT | Mod: ,,, | Performed by: INTERNAL MEDICINE

## 2024-05-08 PROCEDURE — 31652 BRONCH EBUS SAMPLNG 1/2 NODE: CPT | Performed by: INTERNAL MEDICINE

## 2024-05-08 PROCEDURE — 88184 FLOWCYTOMETRY/ TC 1 MARKER: CPT | Performed by: INTERNAL MEDICINE

## 2024-05-08 PROCEDURE — D9220A PRA ANESTHESIA: Mod: CRNA,,, | Performed by: NURSE ANESTHETIST, CERTIFIED REGISTERED

## 2024-05-08 RX ORDER — BENZONATATE 100 MG/1
200 CAPSULE ORAL 3 TIMES DAILY PRN
Status: DISCONTINUED | OUTPATIENT
Start: 2024-05-08 | End: 2024-05-15 | Stop reason: HOSPADM

## 2024-05-08 RX ORDER — GLYCOPYRROLATE 0.2 MG/ML
INJECTION INTRAMUSCULAR; INTRAVENOUS
Status: DISCONTINUED | OUTPATIENT
Start: 2024-05-08 | End: 2024-05-08

## 2024-05-08 RX ORDER — SODIUM CHLORIDE, SODIUM GLUCONATE, SODIUM ACETATE, POTASSIUM CHLORIDE AND MAGNESIUM CHLORIDE 30; 37; 368; 526; 502 MG/100ML; MG/100ML; MG/100ML; MG/100ML; MG/100ML
INJECTION, SOLUTION INTRAVENOUS CONTINUOUS
Status: CANCELLED | OUTPATIENT
Start: 2024-05-08 | End: 2024-06-07

## 2024-05-08 RX ORDER — PROCHLORPERAZINE EDISYLATE 5 MG/ML
5 INJECTION INTRAMUSCULAR; INTRAVENOUS EVERY 30 MIN PRN
Status: CANCELLED | OUTPATIENT
Start: 2024-05-08

## 2024-05-08 RX ORDER — HYDROMORPHONE HYDROCHLORIDE 2 MG/ML
0.4 INJECTION, SOLUTION INTRAMUSCULAR; INTRAVENOUS; SUBCUTANEOUS EVERY 5 MIN PRN
Status: CANCELLED | OUTPATIENT
Start: 2024-05-08

## 2024-05-08 RX ORDER — FOLIC ACID 1 MG/1
1 TABLET ORAL DAILY
Status: DISCONTINUED | OUTPATIENT
Start: 2024-05-09 | End: 2024-05-15 | Stop reason: HOSPADM

## 2024-05-08 RX ORDER — PHENYLEPHRINE HCL IN 0.9% NACL 1 MG/10 ML
SYRINGE (ML) INTRAVENOUS
Status: DISCONTINUED | OUTPATIENT
Start: 2024-05-08 | End: 2024-05-08

## 2024-05-08 RX ORDER — ONDANSETRON 4 MG/1
8 TABLET, ORALLY DISINTEGRATING ORAL EVERY 6 HOURS PRN
Status: CANCELLED | OUTPATIENT
Start: 2024-05-08

## 2024-05-08 RX ORDER — LIDOCAINE HYDROCHLORIDE 40 MG/ML
4 SOLUTION TOPICAL
Status: DISCONTINUED | OUTPATIENT
Start: 2024-05-08 | End: 2024-05-15 | Stop reason: HOSPADM

## 2024-05-08 RX ORDER — FAMOTIDINE 20 MG/1
20 TABLET, FILM COATED ORAL 2 TIMES DAILY
Status: DISCONTINUED | OUTPATIENT
Start: 2024-05-09 | End: 2024-05-09

## 2024-05-08 RX ORDER — LIDOCAINE HYDROCHLORIDE 20 MG/ML
INJECTION INTRAVENOUS
Status: DISCONTINUED | OUTPATIENT
Start: 2024-05-08 | End: 2024-05-08

## 2024-05-08 RX ORDER — HYDROMORPHONE HYDROCHLORIDE 2 MG/ML
0.2 INJECTION, SOLUTION INTRAMUSCULAR; INTRAVENOUS; SUBCUTANEOUS EVERY 5 MIN PRN
Status: CANCELLED | OUTPATIENT
Start: 2024-05-08

## 2024-05-08 RX ORDER — ONDANSETRON HYDROCHLORIDE 2 MG/ML
4 INJECTION, SOLUTION INTRAVENOUS DAILY PRN
Status: CANCELLED | OUTPATIENT
Start: 2024-05-08

## 2024-05-08 RX ORDER — MEPERIDINE HYDROCHLORIDE 25 MG/ML
12.5 INJECTION INTRAMUSCULAR; INTRAVENOUS; SUBCUTANEOUS EVERY 10 MIN PRN
Status: CANCELLED | OUTPATIENT
Start: 2024-05-08 | End: 2024-05-09

## 2024-05-08 RX ORDER — LIDOCAINE HYDROCHLORIDE 10 MG/ML
1 INJECTION, SOLUTION EPIDURAL; INFILTRATION; INTRACAUDAL; PERINEURAL ONCE
Status: CANCELLED | OUTPATIENT
Start: 2024-05-08 | End: 2024-05-08

## 2024-05-08 RX ORDER — PROPOFOL 10 MG/ML
INJECTION, EMULSION INTRAVENOUS
Status: DISCONTINUED | OUTPATIENT
Start: 2024-05-08 | End: 2024-05-08

## 2024-05-08 RX ORDER — KETAMINE HYDROCHLORIDE 100 MG/ML
INJECTION, SOLUTION INTRAMUSCULAR; INTRAVENOUS
Status: DISCONTINUED | OUTPATIENT
Start: 2024-05-08 | End: 2024-05-08

## 2024-05-08 RX ORDER — IPRATROPIUM BROMIDE AND ALBUTEROL SULFATE 2.5; .5 MG/3ML; MG/3ML
3 SOLUTION RESPIRATORY (INHALATION)
Status: CANCELLED | OUTPATIENT
Start: 2024-05-08

## 2024-05-08 RX ORDER — GUAIFENESIN AND DEXTROMETHORPHAN HYDROBROMIDE 10; 100 MG/5ML; MG/5ML
10 SYRUP ORAL EVERY 6 HOURS
Status: COMPLETED | OUTPATIENT
Start: 2024-05-08 | End: 2024-05-11

## 2024-05-08 RX ORDER — ONDANSETRON HYDROCHLORIDE 2 MG/ML
INJECTION, SOLUTION INTRAVENOUS
Status: DISCONTINUED | OUTPATIENT
Start: 2024-05-08 | End: 2024-05-08

## 2024-05-08 RX ORDER — MIDAZOLAM HYDROCHLORIDE 1 MG/ML
INJECTION INTRAMUSCULAR; INTRAVENOUS
Status: DISCONTINUED | OUTPATIENT
Start: 2024-05-08 | End: 2024-05-08

## 2024-05-08 RX ADMIN — DEXAMETHASONE SODIUM PHOSPHATE 4 MG: 4 INJECTION, SOLUTION INTRA-ARTICULAR; INTRALESIONAL; INTRAMUSCULAR; INTRAVENOUS; SOFT TISSUE at 11:05

## 2024-05-08 RX ADMIN — LIDOCAINE HYDROCHLORIDE 100 MG: 20 INJECTION INTRAVENOUS at 09:05

## 2024-05-08 RX ADMIN — MUPIROCIN: 20 OINTMENT TOPICAL at 08:05

## 2024-05-08 RX ADMIN — DEXAMETHASONE SODIUM PHOSPHATE 4 MG: 4 INJECTION, SOLUTION INTRA-ARTICULAR; INTRALESIONAL; INTRAMUSCULAR; INTRAVENOUS; SOFT TISSUE at 10:05

## 2024-05-08 RX ADMIN — SODIUM CHLORIDE, SODIUM GLUCONATE, SODIUM ACETATE, POTASSIUM CHLORIDE AND MAGNESIUM CHLORIDE: 526; 502; 368; 37; 30 INJECTION, SOLUTION INTRAVENOUS at 09:05

## 2024-05-08 RX ADMIN — PROPOFOL 60 MG: 10 INJECTION, EMULSION INTRAVENOUS at 10:05

## 2024-05-08 RX ADMIN — HEPARIN SODIUM 24 UNITS/KG/HR: 10000 INJECTION, SOLUTION INTRAVENOUS at 10:05

## 2024-05-08 RX ADMIN — Medication 100 MCG: at 10:05

## 2024-05-08 RX ADMIN — PROPOFOL 100 MG: 10 INJECTION, EMULSION INTRAVENOUS at 10:05

## 2024-05-08 RX ADMIN — THIAMINE HYDROCHLORIDE 250 MG: 100 INJECTION, SOLUTION INTRAMUSCULAR; INTRAVENOUS at 08:05

## 2024-05-08 RX ADMIN — MIDAZOLAM HYDROCHLORIDE 2 MG: 1 INJECTION, SOLUTION INTRAMUSCULAR; INTRAVENOUS at 09:05

## 2024-05-08 RX ADMIN — KETAMINE HYDROCHLORIDE 50 MG: 100 INJECTION, SOLUTION, CONCENTRATE INTRAMUSCULAR; INTRAVENOUS at 09:05

## 2024-05-08 RX ADMIN — OXYCODONE HYDROCHLORIDE AND ACETAMINOPHEN 1 TABLET: 5; 325 TABLET ORAL at 08:05

## 2024-05-08 RX ADMIN — ONDANSETRON 4 MG: 2 INJECTION INTRAMUSCULAR; INTRAVENOUS at 09:05

## 2024-05-08 RX ADMIN — FOLIC ACID 1 MG: 5 INJECTION, SOLUTION INTRAMUSCULAR; INTRAVENOUS; SUBCUTANEOUS at 08:05

## 2024-05-08 RX ADMIN — DEXAMETHASONE SODIUM PHOSPHATE 4 MG: 4 INJECTION, SOLUTION INTRA-ARTICULAR; INTRALESIONAL; INTRAMUSCULAR; INTRAVENOUS; SOFT TISSUE at 05:05

## 2024-05-08 RX ADMIN — GUAIFENESIN AND DEXTROMETHORPHAN 10 ML: 100; 10 SYRUP ORAL at 02:05

## 2024-05-08 RX ADMIN — AZITHROMYCIN MONOHYDRATE 500 MG: 500 INJECTION, POWDER, LYOPHILIZED, FOR SOLUTION INTRAVENOUS at 04:05

## 2024-05-08 RX ADMIN — LIDOCAINE HYDROCHLORIDE 4 ML: 40 SOLUTION TOPICAL at 09:05

## 2024-05-08 RX ADMIN — CEFTRIAXONE SODIUM 2 G: 2 INJECTION, POWDER, FOR SOLUTION INTRAMUSCULAR; INTRAVENOUS at 02:05

## 2024-05-08 RX ADMIN — PROPOFOL 150 MG: 10 INJECTION, EMULSION INTRAVENOUS at 09:05

## 2024-05-08 RX ADMIN — GLYCOPYRROLATE 0.2 MG: 0.2 INJECTION INTRAMUSCULAR; INTRAVENOUS at 09:05

## 2024-05-08 RX ADMIN — DEXAMETHASONE SODIUM PHOSPHATE 4 MG: 4 INJECTION, SOLUTION INTRA-ARTICULAR; INTRALESIONAL; INTRAMUSCULAR; INTRAVENOUS; SOFT TISSUE at 04:05

## 2024-05-08 NOTE — TRANSFER OF CARE
"Anesthesia Transfer of Care Note    Patient: Adán Dinero    Procedure(s) Performed: Procedure(s) (LRB):  ENDOBRONCHIAL ULTRASOUND (EBUS) (N/A)    Patient location: OPS    Anesthesia Type: general    Transport from OR: Transported from OR on room air with adequate spontaneous ventilation    Post pain: adequate analgesia    Post assessment: no apparent anesthetic complications    Post vital signs: stable    Level of consciousness: awake    Nausea/Vomiting: no nausea/vomiting    Transfer of care protocol was followed      Last vitals: Visit Vitals  /69   Pulse 78   Temp 36.6 °C (97.8 °F) (Oral)   Resp (!) 9   Ht 5' 6" (1.676 m)   Wt 68 kg (150 lb)   SpO2 97%   BMI 24.21 kg/m²     "

## 2024-05-08 NOTE — PT/OT/SLP DISCHARGE
[facility]  Speech Language Pathology Department  Diet Tolerance Follow-up    Patient Name:  Adán Dinero   MRN:  [id number]  Admitting Diagnosis: swelling to L side of neck and change in speech. Pt with R lung mass and concern for SVC syndrome.     Recommendations     General recommendations:  SLP intervention not indicated  Diet texture/consistency recommendations:  Regular solids (IDDSI 7) and thin liquids (IDDSI 0)  Medications: per patient preference  Swallow strategies/precautions: alternate solids/liquids and upright for PO intake  Precautions: Standard, aspiration    Diet Tolerance     Nursing reports no difficulty regarding diet tolerance.    Plan     Skilled SLP services no longer warranted, please reconsult as needed.      05/08/2024

## 2024-05-08 NOTE — PROGRESS NOTES
[facility]  Pulmonary Critical Care Note    Patient Name: Adán Dinero  MRN: [id number]  Admission Date: 5/6/2024  Hospital Length of Stay: 2 days  Code Status: Full Code  Attending Provider: Rob Spence MD  Primary Care Provider: Michell Gutierrez     Subjective:     HPI:   54-year-old male with no significant past medical history presents to or [facility] ED complaining of neck swelling and change in voice.  Patient was incidentally found to have right lung mass with concern for SVC syndrome on a CT scan 3 weeks ago, he was scheduled to follow up on the resulting 2 days, 2 days ago he started noticing swelling in his neck and significant change in his voice, his also been having some difficulty swallowing solids, but no problems with liquids.  He has been having shortness of breath chronically but no acute change, has been having some dizziness.  Otherwise denies chest pain, cough, worsening shortness of breath, diarrhea, constipation, dysuria, abdominal pain.  In the ED he was afebrile, vital signs stable, satting 94% on room air, labs including CBC and CMP normal. Hematology and Oncology was consulted who recommended admitting the patient to the ICU due to high-risk for decompensation in the setting SVC syndrome.    Hospital Course/Significant events:  05/06/2024 admission to the ICU for observation    24 Hour Interval History:  Patient says that he feels like a new man.  Patient says the swelling has decreased tremendously with Decadron.  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  Patient says that for the past few days since Saturday that he began to have issues with his breathing and his family noticed that his face was extremely swollen he says that he was unable to open his eyes.  Patient says that he has pain in his chest and his back.  At the area of his cancer.  Patient to undergo EBUS this a.m..  He has been NPO.  No issues with eating or swallowing before he was made NPO.    Past Medical History:   Diagnosis Date    Lung mass        Past Surgical History:   Procedure Laterality Date    CATARACT EXTRACTION W/  INTRAOCULAR LENS IMPLANT Left 06/28/2023    Procedure: EXTRACTION, CATARACT, WITH IOL INSERTION;  Surgeon: Fritz Cornejo MD;  Location: [facility];  Service: Ophthalmology;  Laterality: Left;    HERNIA REPAIR         Social History     Socioeconomic History    Marital status: Single   Tobacco Use    Smoking status: Every Day     Current packs/day: 0.25     Types: Cigarettes    Smokeless tobacco: Former   Substance and Sexual Activity    Alcohol use: Yes     Comment: 1/2 pint of whiskey daily           Objective:     Current Outpatient Medications   Medication Instructions    acetaminophen (TYLENOL) 650 mg, Oral, As needed (PRN)    ketorolac 0.4% (ACULAR) 0.4 % Drop 1 drop, Left Eye, 4 times daily    moxifloxacin (VIGAMOX) 0.5 % ophthalmic solution 1 drop, Left Eye, 3 times daily    prednisoLONE acetate (PRED FORTE) 1 % DrpS 1 drop, Left Eye, 4 times daily       Current Inpatient Medications   azithromycin  500 mg Intravenous Q24H    cefTRIAXone (Rocephin) IV (PEDS and ADULTS)  2 g Intravenous Q24H    dexAMETHasone  4 mg Intravenous Q6H    folic acid (FOLVITE) IVPB  1 mg Intravenous Daily    mupirocin   Nasal BID    thiamine (B-1) 250 mg in dextrose 5 % (D5W) 100 mL IVPB  250 mg Intravenous Daily    Followed by    [START ON 5/10/2024] thiamine  100 mg Oral TID           Intake/Output Summary (Last 24 hours) at 5/8/2024 0722  Last data filed at 5/8/2024 0603  Gross per 24 hour   Intake 1070.87 ml   Output 2800 ml   Net -1729.13 ml       Review of Systems   Constitutional:  Negative for chills and fever.   Eyes:  Positive for double vision.   Respiratory:  Positive for shortness of breath. Negative for cough and hemoptysis.    Cardiovascular:  Negative for chest pain and leg swelling.   Gastrointestinal:  Negative for  abdominal pain, constipation, diarrhea, nausea and vomiting.   Genitourinary:  Negative for dysuria and urgency.   Neurological:  Positive for dizziness.        Vital Signs (Most Recent):  Temp: 97.8 °F (36.6 °C) (05/08/24 0400)  Pulse: 72 (05/08/24 0700)  Resp: (!) 9 (05/08/24 0700)  BP: 129/78 (05/08/24 0700)  SpO2: (!) 94 % (05/08/24 0700)  Body mass index is 24.21 kg/m².  Weight: 68 kg (150 lb) Vital Signs (24h Range):  Temp:  [97.6 °F (36.4 °C)-98.2 °F (36.8 °C)] 97.8 °F (36.6 °C)  Pulse:  [] 72  Resp:  [0-25] 9  SpO2:  [92 %-98 %] 94 %  BP: (100-140)/(67-94) 129/78     Physical Exam  Constitutional:       Appearance: Normal appearance.   HENT:      Head:      Comments: Bilateral neck swelling with periorbital edema  Muffled voice  Cardiovascular:      Rate and Rhythm: Normal rate and regular rhythm.      Pulses: Normal pulses.      Heart sounds: Normal heart sounds.   Pulmonary:      Effort: Pulmonary effort is normal.      Breath sounds: Wheezing present.   Abdominal:      General: Abdomen is flat.      Palpations: Abdomen is soft.   Musculoskeletal:         General: Normal range of motion.   Skin:     General: Skin is warm and dry.      Capillary Refill: Capillary refill takes less than 2 seconds.   Neurological:      Mental Status: He is alert and oriented to person, place, and time.           Mechanical ventilation support:       Lines/Drains/Airways       Peripheral Intravenous Line  Duration                  Peripheral IV - Single Lumen 05/06/24 1625 20 G Anterior;Left Forearm 1 day         Peripheral IV - Single Lumen 05/06/24 1849 18 G Anterior;Right Forearm 1 day         Peripheral IV - Single Lumen 05/06/24 1850 18 G Left;Posterior Forearm 1 day                    Significant Labs:    Lab Results   Component Value Date    WBC 12.24 (H) 05/08/2024    HGB 12.7 (L) 05/08/2024    HCT 39.2 (L) 05/08/2024    MCV 97.8 (H) 05/08/2024     05/08/2024         BMP  Lab Results   Component Value Date  "    (L) 05/08/2024    K 4.1 05/08/2024     06/12/2023    CO2 27 05/08/2024    BUN 13.3 05/08/2024    CREATININE 0.71 (L) 05/08/2024    CALCIUM 9.7 05/08/2024    ANIONGAP 8 06/12/2023       ABG  No results for input(s): "PH", "PO2", "PCO2", "HCO3", "BE" in the last 168 hours.        Significant Imaging:  I have reviewed all pertinent imaging within the past 24 hours.        Assessment/Plan:     Assessment  SVC syndrome  Dysphagia  Hoarseness  Neck and facial swelling  Right upper lung mass  Nonspecific hypodense lesion found in liver      Plan  Patient had clinical response to Decadron as per Oncology   Vascular surgery evaluated patient he has not a surgical candidate for stenting as there are is inflow noticed on imaging.  And patient has had clinical response with Decadron.    Patient to go for EBUS this a.m.  P.r.n. are in place for alcoholic withdrawals and for patient's pain.  Once patient has been monitored clinically after EBUS with no issues, can likely downgrade     DVT Prophylaxis:  Heparin drip  GI Prophylaxis:  None          Abdoulaye Zamora MD  Pulmonary Critical Care Medicine  [facility] - Emergency Dept    "

## 2024-05-08 NOTE — NURSING
Nurses Note -- 4 Eyes      5/8/2024   6:10 AM      Skin assessed during: Daily Assessment      [x] No Altered Skin Integrity Present    [x]Prevention Measures Documented      [] Yes- Altered Skin Integrity Present or Discovered   [] LDA Added if Not in Epic (Describe Wound)   [] New Altered Skin Integrity was Present on Admit and Documented in LDA   [] Wound Image Taken    Wound Care Consulted? No    Attending Nurse:  Jane White RN/Staff Member:  Petra JC

## 2024-05-08 NOTE — ANESTHESIA PREPROCEDURE EVALUATION
05/08/2024  Adán Dinero is a 54 y.o., male admitted to the ICU March 6th after presenting with neck swelling and voice change.  Further workup revealed right lung mass with SVC syndrome.  Patient presents today for bronchoscopy with endobronchial ultrasound and notes symptomatic improvement in neck swelling as well as dysphagia.      Pre-op Assessment    I have reviewed the Patient Summary Reports.    I have reviewed the NPO Status.   I have reviewed the Medications.     Review of Systems  Anesthesia Hx:               Denies Personal Hx of Anesthesia complications.                    Social:  Non-Smoker       Cardiovascular:   Functional Capacity good / => 4 METS                             Physical Exam  General: Well nourished, Cooperative, Alert and Oriented    Airway:  Mallampati: II   Mouth Opening: Normal  TM Distance: Normal  Tongue: Normal  Neck ROM: Normal ROM    Dental:  Edentulous    Chest/Lungs:  Clear to auscultation, Normal Respiratory Rate    Heart:  Rate: Normal  Rhythm: Regular Rhythm        Anesthesia Plan  Type of Anesthesia, risks & benefits discussed:    Anesthesia Type: Gen Supraglottic Airway  Intra-op Monitoring Plan: Standard ASA Monitors  Post Op Pain Control Plan: multimodal analgesia and IV/PO Opioids PRN  Induction:  IV  Airway Plan: Direct  Informed Consent: Informed consent signed with the Patient and all parties understand the risks and agree with anesthesia plan.  All questions answered.   ASA Score: 3  Day of Surgery Review of History & Physical: H&P Update referred to the surgeon/provider.    Ready For Surgery From Anesthesia Perspective.     .

## 2024-05-08 NOTE — NURSING
Nurses Note -- 4 Eyes      5/8/2024   4:43 PM      Skin assessed during: Daily Assessment      [x] No Altered Skin Integrity Present    [x]Prevention Measures Documented      [] Yes- Altered Skin Integrity Present or Discovered   [] LDA Added if Not in Epic (Describe Wound)   [] New Altered Skin Integrity was Present on Admit and Documented in LDA   [] Wound Image Taken    Wound Care Consulted? No    Attending Nurse:  Johana White RN/Staff Member:  BABITA Go

## 2024-05-08 NOTE — PLAN OF CARE
Attempted to send referral to [facility] in [place], however pathology results not in. They cannot receive referral without all information. Will need referral sent. Phone # [phone] fax # [phone]

## 2024-05-08 NOTE — PROGRESS NOTES
[facility] - Oncology Acute  Hematology/Oncology  Consult Note    Patient Name: Adán Dinero  MRN: [id number]  Admission Date: 5/6/2024  Hospital Length of Stay: 2 days  Attending Provider: Rob Spence MD  Consulting Provider: Carlos Cohen MD  Principal Problem:<principal problem not specified>    Consults  Subjective:     HPI:Pt arrives by [facility], transferred from [facility] for swelling to L side of neck and change in speech. Pt sent for pulmonology/oncology services for Dx. superior vena cava syndrome per EMS. reports swelling to L neck, muffled speech, headache, throat pressure.      He was seen in the ED and a CT of the head which was normal, CT soft tissue of the neck which showed generalized edema throughout the soft tissue he would right jugular vein engorgement in the CT showed a large mass in the right lung apex right hilum mediastinum 11 cm mass causes obstruction of the SVC with thrombus or tumor thrombus extending into the SVC at the level of the brachiocephalic vein and the mass encases the right pulmonary artery right hilum partially obstructing right upper lobe, there is a postobstructive pneumonia in the right upper lobe and involvement of the brachial plexus adjacent subclavian artery with mass in the right lung apex.  And a right-sided pleural effusion    Today 05/08/2024: Patient lying in ICU comfortably.  He still has some headache and dizziness on turning his neck.  But he states it is better.  No hemoptysis no bleeding today.  Good urine output, less coughing.  Hep B surface antigen negative.  Urine drug screen negative      CT of the abdomen pelvis yesterday with questionable liver metastases as well    MRI brain --motion artifact      Medications:  Continuous Infusions:   heparin (porcine) in D5W  0-40 Units/kg/hr (Adjusted) Intravenous Continuous   Stopped at 05/08/24 0212     Scheduled Meds:   azithromycin  500 mg Intravenous Q24H    cefTRIAXone  (Rocephin) IV (PEDS and ADULTS)  2 g Intravenous Q24H    dexAMETHasone  4 mg Intravenous Q6H    folic acid (FOLVITE) IVPB  1 mg Intravenous Daily    mupirocin   Nasal BID    thiamine (B-1) 250 mg in dextrose 5 % (D5W) 100 mL IVPB  250 mg Intravenous Daily    Followed by    [START ON 5/10/2024] thiamine  100 mg Oral TID     PRN Meds:  Current Facility-Administered Medications:     acetaminophen, 650 mg, Oral, Q6H PRN    heparin (PORCINE), 60 Units/kg (Adjusted), Intravenous, PRN    heparin (PORCINE), 30 Units/kg (Adjusted), Intravenous, PRN    LIDOcaine HCL 4%, 4 mL, Topical (Top), PRN    LORazepam, 2 mg, Oral, Q2H PRN    oxyCODONE-acetaminophen, 1 tablet, Oral, Q6H PRN    oxyCODONE-acetaminophen, 1 tablet, Oral, Q6H PRN    sodium chloride 0.9%, 10 mL, Intravenous, PRN     Review of patient's allergies indicates:  No Known Allergies     Past Medical History:   Diagnosis Date    Lung mass      Past Surgical History:   Procedure Laterality Date    CATARACT EXTRACTION W/  INTRAOCULAR LENS IMPLANT Left 06/28/2023    Procedure: EXTRACTION, CATARACT, WITH IOL INSERTION;  Surgeon: Fritz Cornejo MD;  Location: [facility];  Service: Ophthalmology;  Laterality: Left;    HERNIA REPAIR       Family History    None       Social history   smoker,   alcohol,   Tugboat worker,    Review of Systems   Constitutional:  Positive for fatigue. Negative for fever and unexpected weight change.   HENT:  Positive for congestion and facial swelling.    Respiratory:  Positive for shortness of breath.    Cardiovascular: Negative.    Gastrointestinal: Negative.    Endocrine: Negative.    Genitourinary: Negative.    Musculoskeletal: Negative.    Skin: Negative.    Neurological:  Positive for numbness. Negative for tremors, seizures, syncope and weakness.   Hematological: Negative.    Psychiatric/Behavioral: Negative.       Objective:     Vital Signs (Most Recent):  Temp: 97.8 °F (36.6 °C) (05/08/24 0400)  Pulse: 81 (05/08/24 0730)  Resp: 18  (05/08/24 0730)  BP: 115/76 (05/08/24 0730)  SpO2: 95 % (05/08/24 0730) Vital Signs (24h Range):  Temp:  [97.6 °F (36.4 °C)-98.2 °F (36.8 °C)] 97.8 °F (36.6 °C)  Pulse:  [] 81  Resp:  [0-25] 18  SpO2:  [91 %-98 %] 95 %  BP: (100-140)/(67-94) 115/76     Weight: 68 kg (150 lb)  Body mass index is 24.21 kg/m².  Body surface area is 1.78 meters squared.      Intake/Output Summary (Last 24 hours) at 5/8/2024 0748  Last data filed at 5/8/2024 0603  Gross per 24 hour   Intake 1070.87 ml   Output 2800 ml   Net -1729.13 ml       Physical Exam  Vitals reviewed.   Constitutional:       General: He is not in acute distress.  HENT:      Mouth/Throat:      Mouth: Mucous membranes are moist.      Pharynx: Oropharynx is clear.   Eyes:      Extraocular Movements: Extraocular movements intact.      Conjunctiva/sclera: Conjunctivae normal.      Pupils: Pupils are equal, round, and reactive to light.   Neck:      Comments: Left-sided neck vein swelling facial swelling  Cardiovascular:      Rate and Rhythm: Normal rate and regular rhythm.      Pulses: Normal pulses.      Heart sounds: Normal heart sounds.   Pulmonary:      Effort: Pulmonary effort is normal. No accessory muscle usage.      Breath sounds: Decreased air movement present. Examination of the right-middle field reveals decreased breath sounds. Decreased breath sounds present.   Chest:      Comments: Dilated veins, erythema  Abdominal:      General: Abdomen is flat. Bowel sounds are normal. There is no distension.      Palpations: Abdomen is soft.      Comments: Old hernia repair scar   Musculoskeletal:         General: Normal range of motion.   Lymphadenopathy:      Cervical: Cervical adenopathy present.      Upper Body:      Right upper body: No axillary adenopathy.      Left upper body: No axillary adenopathy.      Comments: Questionable some mild shotty inguinal adenopathy   Skin:     General: Skin is warm and dry.      Comments: Multiple tattoos   Neurological:       General: No focal deficit present.      Mental Status: He is alert and oriented to person, place, and time. Mental status is at baseline.      GCS: GCS eye subscore is 4. GCS verbal subscore is 5. GCS motor subscore is 6.      Cranial Nerves: Cranial nerves 2-12 are intact. No cranial nerve deficit.      Sensory: Sensation is intact.      Motor: No weakness.      Coordination: Coordination is intact.      Comments: He does have some mild numbness in his left hand.       Psychiatric:         Attention and Perception: Attention normal.         Mood and Affect: Mood normal.         Speech: Speech normal.         Behavior: Behavior normal.         Thought Content: Thought content normal.         Significant Labs:     Lab Review:  CBC:   Recent Labs     05/08/24  0332 05/07/24  0153 05/06/24  1716   WBC 12.24* 7.83 10.15   RBC 4.01* 3.93* 4.00*   HGB 12.7* 12.5* 12.7*   HCT 39.2* 38.2* 38.4*    305 324     CMP:   Recent Labs     05/08/24  0332 05/07/24  0153 05/06/24  1716   * 132* 134*   K 4.1 4.2 4.5   CO2 27 23 25   BUN 13.3 10.0 9.9   CREATININE 0.71* 0.65* 0.73   CALCIUM 9.7 9.2 9.5   LABPROT 6.8 6.8 7.1   ALBUMIN 3.1* 3.0* 3.1*   BILITOT 0.2 0.2 0.3   ALKPHOS 100 110 120   AST 11 11 13   ALT 7 6 8        Diagnostic Results:  I have reviewed all pertinent imaging results/findings within the past 24 hours.          Assessment/Plan:   SVC syndrome,   tumor thrombus  Lung Mass-  Abnormal LN  Pleural effusion  Postobstructive pneumonia  Tobacco use  Alcohol use  Abnormal CT of the liver        I discussed his case with the ER physician.    I explained to the patient the CT findings and the concern for malignancy.    We discussed the risk of bleeding.    We discussed the risk of withdrawal with his alcohol use   We discussed the need for tissue biopsy.    Has a risk of thrombotic events and closure of the SVC.  He was having difficulty swallowing as well      REC  MRI of the brain-negative- but motion  artifact  NPO until procedures  Banana bag  IV heparin -low intensity with  mild hemoptysis and need for procedures-- Pharmacy to dose--discus pharmacy. No bolus, low intensity------after procedure consider switching to Lovenox  Dexamethasone 4 mg IV q.6     Careful of DTs    Biopsy with Pulmonary  Will need Social work to arrange treatment at Home after d/c   Oncology outpatient   And Rad onc  Will try to get first chemo inpatient as well    --once stable move to oncology floor  PICC line for chemo- left side when able        Carlos Cohen MD  Hematology/Oncology  [facility] - Oncology Acute

## 2024-05-08 NOTE — H&P
[facility] Hospital Medicine History & Physical Examination       Patient Name: Adán Dinero  MRN: [id number]  Patient Class: IP- Inpatient   Admission Date: 5/6/2024   Admitting Physician: HEATH Service   Length of Stay: 2  Attending Physician: Dr Chio Wagner  Primary Care Provider: non-staff  Face-to-Face encounter date: 05/08/2024  Code Status: Full code  Chief Complaint: transfer (Transfer from [facility] for  oncology and pulmonology.)        Patient information was obtained from patient, patient's family, past medical records and/or ER records.     MD addendum-    53 yo male transferred from  [facility] for pulmonary and vascular service with a concern for SVC syndrome with associated facial swelling, difficulty swallowing and hoarseness in the setting of recent findings of Rt lung apical mass . Other associated symptoms include hemoptysis few weeks ago, generalized weakness and dizziness. Workup showed Rt lung mass compressing the SVC with obstruction due to thrombus or tumor thrombus extending to the level of brachiocephalic veins. There was also a concern for postobstructive pneumonia in the right upper lobe. Pt was started on IV heparin along with antibiotic for pneumonia. On Vascular evaluation it was suggested that there is obvious compression but no occlusion , therefor no indication for venogram and stenting at this time. Oncology service initiated Decadron IV with improvement of symptoms and Pt underwent bronchoscopy with  EBUS and biopsy of the mass today with Fast read suggested lymphoid tissue consistent with possible lymphoma. Heparin gtt re initiated post procedure with plan to continue Heparin overnight and if no hemoptysis or signs of active bleeding can be transitioned to SubQ treatment dose Lovenox from tomorrow. Oncology service recommended MRI brain which was done, radiation oncology consult, PICC line and start first chemo as inpatient. Today pt  deemed stable for transfer out of ICU and  consulted to assume care.              HISTORY OF PRESENT ILLNESS:   Adán Dinero is a 54 y.o. male who  PMH includes lung mass, neck swelling with voice changes over the past several weeks. PT presented to [facility] ED in [place] for his symptoms and was transferred  to  ED [facility] on 5/6/2024  for higher level of care pulmonary Services.and pulmonary and vascular services.  He has a history of ETOH abuse also. Patient was incidentally found to have right lung mass with concern for SVC syndrome on a CT scan 3 weeks ago, he was scheduled to follow up on the results with his PCP a few days prior to arrival in the ED. He reported he began to have next swelling and change in his voice 2 days before arrival in the ED and progressively got worse prompting ED visit.. Patient also also been having some difficulty swallowing solids, but no problems with liquids, shortness of breath chronically but no acute change, has been having some dizziness.  Patient reported no chest pain, cough, congestion, nausea, vomiting, diarrhea, abdominal pain or any urinary symptoms.  Workup in the ED demonstrated SVC syndrome.  Patient was started on heparin infusion.  Oncology and Hematology Services were consulted with recommendations of admit inpatient to the ICU unit secondary to  high-risk for decompensation in the setting SVC syndrome.  Patient was placed on steroid therapy. He was evaluated by pulmonary Services which he had EBUS done with biopsies.  Patient hemodynamics have been stable.  He has been cleared for transfer out of ICU to the regular floor.  Hospital medicine services have been consulted for assumption of care.       Critical care note:  Critical care diagnosis: SVC syndrome requiring IV heparin and IV decadron   Critical care interventions: Hands-on evaluation, review of labs/radiographs/records and discussion with patient and family if present  Critical care time spent:  35 minutes       PAST MEDICAL HISTORY:   Lung mass  ETOH abuse  Tobacco abuse    PAST SURGICAL HISTORY:     Past Surgical History:   Procedure Laterality Date    CATARACT EXTRACTION W/  INTRAOCULAR LENS IMPLANT Left 06/28/2023    Procedure: EXTRACTION, CATARACT, WITH IOL INSERTION;  Surgeon: Fritz Cornejo MD;  Location: [facility];  Service: Ophthalmology;  Laterality: Left;    HERNIA REPAIR         ALLERGIES:   Patient has no known allergies.    FAMILY HISTORY:   Reviewed and negative    SOCIAL HISTORY:     Social History     Tobacco Use    Smoking status: Every Day     Current packs/day: 0.25     Types: Cigarettes    Smokeless tobacco: Former   Substance Use Topics    Alcohol use: Yes     Comment: 1/2 pint of whiskey daily        HOME MEDICATIONS:   As documented  Prior to Admission medications    Medication Sig Start Date End Date Taking? Authorizing Provider   acetaminophen (TYLENOL) 650 MG TbSR Take 650 mg by mouth as needed.    Provider, Historical   ketorolac 0.4% (ACULAR) 0.4 % Drop Place 1 drop into the left eye 4 (four) times daily.    Provider, Historical   moxifloxacin (VIGAMOX) 0.5 % ophthalmic solution Place 1 drop into the left eye 3 (three) times daily.    Provider, Historical   prednisoLONE acetate (PRED FORTE) 1 % DrpS Place 1 drop into the left eye 4 (four) times daily.    Provider, Historical       REVIEW OF SYSTEMS:   Except as documented, all other systems reviewed and negative     PHYSICAL EXAM:     VITAL SIGNS: 24 HRS MIN & MAX LAST   Temp  Min: 97.6 °F (36.4 °C)  Max: 98.2 °F (36.8 °C) 98.2 °F (36.8 °C)   BP  Min: 100/67  Max: 140/83 120/79   Pulse  Min: 72  Max: 111  79   Resp  Min: 7  Max: 25 15   SpO2  Min: 91 %  Max: 100 % 98 %       General appearance:  Chronically ill-appearing looks older than stated age; nontoxic,no family at bedside  HENT: Atraumatic head. Moist mucous membranes of oral cavity, poor dentition, mild left side facial and neck swelling  Eyes:PERRL  Lungs: diminished  bilaterally. No wheezing present.   Heart: Regular rate and rhythm. S1 and S2 present with no murmurs/gallop/rub. No pedal edema. No JVD present.   Abdomen: Soft, non-distended, non-tender. No rebound tenderness/guarding. Bowel sounds are normal.   Extremities: No cyanosis, clubbing, or edema.  Skin: warm and dry, tattoos noted  Neuro: oriented x 3, no acute focal deficits  Psych/mental status: flat affect. Responds appropriately to questions.     LABS AND IMAGING:     Recent Labs   Lab 05/06/24 1716 05/07/24 0153 05/08/24 0332   WBC 10.15 7.83 12.24*   RBC 4.00* 3.93* 4.01*   HGB 12.7* 12.5* 12.7*   HCT 38.4* 38.2* 39.2*   MCV 96.0* 97.2* 97.8*   MCH 31.8* 31.8* 31.7*   MCHC 33.1 32.7* 32.4*   RDW 12.9 12.8 12.7    305 302   MPV 10.1 9.7 10.5*       Recent Labs   Lab 05/06/24 1716 05/07/24 0153 05/08/24  0332   * 132* 134*   K 4.5 4.2 4.1   CO2 25 23 27   BUN 9.9 10.0 13.3   CREATININE 0.73 0.65* 0.71*   CALCIUM 9.5 9.2 9.7   ALBUMIN 3.1* 3.0* 3.1*   ALKPHOS 120 110 100   ALT 8 6 7   AST 13 11 11   BILITOT 0.3 0.2 0.2       Microbiology Results (last 7 days)       Procedure Component Value Units Date/Time    Blood Culture [[id number]]  (Normal) Collected: 05/06/24 1849    Order Status: Completed Specimen: Blood Updated: 05/07/24 2001     CULTURE, BLOOD ([facility]) No Growth At 24 Hours    Blood Culture [[id number]]  (Normal) Collected: 05/06/24 1849    Order Status: Completed Specimen: Blood Updated: 05/07/24 2001     CULTURE, BLOOD ([facility]) No Growth At 24 Hours             MRI Brain W WO Contrast  Narrative: EXAMINATION:  MRI BRAIN W WO CONTRAST    CLINICAL HISTORY:  Brain metastases suspected;    TECHNIQUE:  Multiplanar multisequence MR imaging of the brain was performed without and with contrast.    COMPARISON:  None    FINDINGS:  The examination is very limited due to artifact.  Large areas of the cerebral convexity on the right and left side are not well evaluated due to the artifact.  No obvious  metastatic focus is seen in the cerebellum or the posterior parietal regions or the temporal regions.  No metastatic foci are seen in the occipital lobes.  No determination can be made about the cerebral convexities due to significant plaque.  Ventricles and basal cisterns appear grossly unremarkable.  There is some cerebral atrophy seen.  Visualized portion of the calvarium appear grossly unremarkable.    No obvious hemorrhage is seen.  Impression: No obvious metastatic disease seen however large areas of the cerebral convexity specially on the right side and to lesser extent left side are not well evaluated on this examination due to artifact    Electronically signed by: Anurag Yates  Date:    05/07/2024  Time:    17:26  Fl Modified Barium Swallow Speech  See procedure notes from Speech Pathologist.    This procedure was auto-finalized.        ASSESSMENT & PLAN:   ASSESSMENT:  Acute SVC syndrome-POA   Acute dysphagia-POA   Neck and facial swelling-suspected secondary to SVC syndrome-POA  Lung mass right upper-newly diagnosed- POA  Liver lesion- suspected metastatic disease- POA  Weakness- POA      PLAN:  Oncology Services evaluated the patient appreciate assistance and recommendations   Pulmonary Services evaluated patient appreciate assistance and recommendations   Vascular Services evaluated patient appreciate assistance and recommendations   Continue with steroid therapy   Continue with heparin drip  for now with plans for conversion to lovenox per oncology notes  CIWA protocol   Swelling has improved  MRI ordered per oncology and pending  Oncology services plans for PICC line placement and initiation of chemo inpatient  Continue with  ST services   Pt has tolerated PO diet and reports swallowing better  Fall precautions   Accurate I&O  Daily weight   Monitor coagulation with heparin drip per protocol  Repeat lab work in a.m.       VTE Prophylaxis: Heparin drip  for DVT prophylaxis and will be advised to be as  mobile as possible and sit in a chair as tolerated    Patient condition:  Stable  __________________________________________________________________________  INPATIENT LIST OF MEDICATIONS     Scheduled Meds:   azithromycin  500 mg Intravenous Q24H    cefTRIAXone (Rocephin) IV (PEDS and ADULTS)  2 g Intravenous Q24H    dexAMETHasone  4 mg Intravenous Q6H    folic acid (FOLVITE) IVPB  1 mg Intravenous Daily    mupirocin   Nasal BID    thiamine (B-1) 250 mg in dextrose 5 % (D5W) 100 mL IVPB  250 mg Intravenous Daily    Followed by    [START ON 5/10/2024] thiamine  100 mg Oral TID     Continuous Infusions:   heparin (porcine) in D5W  0-40 Units/kg/hr (Adjusted) Intravenous Continuous   Stopped at 05/08/24 0212     PRN Meds:.  Current Facility-Administered Medications:     acetaminophen, 650 mg, Oral, Q6H PRN    heparin (PORCINE), 60 Units/kg (Adjusted), Intravenous, PRN    heparin (PORCINE), 30 Units/kg (Adjusted), Intravenous, PRN    LIDOcaine HCL 4%, 4 mL, Topical (Top), PRN    LORazepam, 2 mg, Oral, Q2H PRN    oxyCODONE-acetaminophen, 1 tablet, Oral, Q6H PRN    oxyCODONE-acetaminophen, 1 tablet, Oral, Q6H PRN    sodium chloride 0.9%, 10 mL, Intravenous, PRN      Cristel GROVER FNP have reviewed and discussed the case with Dr. Chio Wagner.  Please see the following addendum for further assessment and plan from their attending MD.  LASHAY Hsu   05/08/2024    ________________________________________________________________________________    MD Addendum:  Dr. LENORE ---assumed care of this patient today at ---am/pm  For the patient encounter, I performed the substantive portion of the visit, I reviewed the NP/PA documentation, treatment plan, and medical decision making.  I had face to face time with this patient     A. History:    B. Physical exam:    C. Medical decision making:    Discharge Planning and Disposition: No mobility needs. Ambulating well. Good social support system.   Anticipated  discharge    All diagnosis and differential diagnosis have been reviewed; assessment and plan has been documented; I have personally reviewed the labs and test results that are presently available; I have reviewed the patients medication list; I have reviewed the consulting providers response and recommendations. I have reviewed or attempted to review medical records based upon their availability.    All of the patient and family questions have been addressed and answered. Patient's is agreeable to the above stated plan. I will continue to monitor closely and make adjustments to medical management as needed.

## 2024-05-08 NOTE — PROCEDURES
[facility]  Bronchoscopy Procedure Note    SUMMARY     Date of Procedure: 5/8/2024    Procedure: Bronchoscopy, Diagnostic  Valenzuela Needle biopsy    Performed by: SCARLETT Smart MD    Pre-Procedure Diagnosis: Mediastinal mass    Post-Procedure Diagnosis:  same    Anesthesia: Monitored Anesthesia Care (MAC)        Description of the Findings of the Procedure:     Patient was consented for the procedure with all risk and benefit of the procedure explained in detail.  Patient was given the opportunity to ask questions and all concerns were answered.  The bronchocope was inserted into the main airway via the  LMA. An anatomical survey was done of the main airways and the subsegmental bronchus.   The sydney was splayed.  There appeared to be endobronchial involvement from the mediastinal mass on the anterior and superior wall of the right mainstem bronchus.  At the secondary sydney there was additional areas of airway involvement with some necrosis noted within the airway.  The EBUS scope was then inserted and the large mediastinal mass was visualized at station 4 and multiple biopsies were obtained from this area.  Fast read suggested lymphoid tissue consistent with possible lymphoma.  Additional node was identified in station 7.  This was also biopsied.  Flow cytometry will be performed as well on the specimen.      Complications: None; patient tolerated the procedure well.    Estimated Blood Loss (EBL): Minimal           Specimens:   Station 7 node and station 4 node       Condition: Good        Disposition:  ICU - hemodynamically stable.  Patient is an inpatient in the ICU and will be returned there to continue care.    SCARLETT Smart MD  [facility]

## 2024-05-08 NOTE — CONSULTS
[facility] - [facility] ICU  Radiation Oncology  Consult Note    Patient Name: Adán Dinero  MRN: [id number]  Admission Date: 5/6/2024  Hospital Length of Stay: 2 days  Code Status: Full Code   Attending Provider: Chio Wagner MD  Consulting Provider: LASHAY Jimenez  Primary Care Physician: Matt Provider  Principal Problem:<principal problem not specified>    Consults  Subjective:     HPI: Patient transferred from [facility] with c/o swelling to L side of neck and change in speech. See in ED and CT of the head was normal, however CT of soft issue of the neck showed generalized edema throughout the soft tissue and right jugular vein engorgement, as well as 11cm mass in right lung causing obstruction of the SVC and thrombus or tumor thrombus extending into the SVC and encasing into the right pulmonary artery; there is a postobstructive pneumonia in the RUL, pleural effusion, and involvement of the brachial plexus. He was admitted and started on high dose steroids as well as heparin gtt.  Seen by vascular surgery who did not recommend proceeding with venogram/stenting at this time. Also seen by medical oncology who recommended biopsy of lung mass to determine pathology and further treatment recommendations.     Seen on rounds today. Lying awake in bed alert and oriented. He underwent biopsy of lung mass this morning with final pathology pending. I discussed the role of radiation in treatment of lung cancer but discussed with patient that we would have to await final pathology prior to making further recommendations. Patient is clinically stable at this time with no apparent SOB  or increased WOB. Nasal cannula present with O2 at 1L/min per RN. RN does report some scattered desaturation episodes throughout the night last night but states that he has had no issues with respiratory status today. Patient denies pain at this time.     Oncology Treatment Plan:   [No matching plan  found]    Current Facility-Administered Medications   Medication    acetaminophen tablet 650 mg    azithromycin 500 mg in dextrose 5 % 250 mL IVPB (ready to mix)    cefTRIAXone (ROCEPHIN) 2 g in dextrose 5 % in water (D5W) 100 mL IVPB (MB+)    dexAMETHasone injection 4 mg    folic acid 1 mg in dextrose 5 % (D5W) 100 mL IVPB    heparin 25,000 units in dextrose 5% (100 units/ml) IV bolus from bag LOW INTENSITY nomogram - LAF    heparin 25,000 units in dextrose 5% (100 units/ml) IV bolus from bag LOW INTENSITY nomogram - LAF    heparin 25,000 units in dextrose 5% 250 mL (100 units/mL) infusion LOW INTENSITY nomogram - LAF    LIDOcaine HCL 4% external solution 4 mL    LORazepam tablet 2 mg    mupirocin 2 % ointment    oxyCODONE-acetaminophen  mg per tablet 1 tablet    oxyCODONE-acetaminophen 5-325 mg per tablet 1 tablet    sodium chloride 0.9% flush 10 mL    thiamine (B-1) 250 mg in dextrose 5 % (D5W) 100 mL IVPB    Followed by    [START ON 5/10/2024] thiamine tablet 100 mg       Review of patient's allergies indicates:  No Known Allergies     Past Medical History:   Diagnosis Date    Lung mass      Past Surgical History:   Procedure Laterality Date    CATARACT EXTRACTION W/  INTRAOCULAR LENS IMPLANT Left 06/28/2023    Procedure: EXTRACTION, CATARACT, WITH IOL INSERTION;  Surgeon: Fritz Cornejo MD;  [facility]: [facility];  Service: Ophthalmology;  Laterality: Left;    HERNIA REPAIR       Family History    None       Tobacco Use    Smoking status: Every Day     Current packs/day: 0.25     Types: Cigarettes    Smokeless tobacco: Former   Substance and Sexual Activity    Alcohol use: Yes     Comment: 1/2 pint of whiskey daily    Drug use: Not on file    Sexual activity: Not on file       Review of Systems   Constitutional:  Positive for activity change and fatigue.     Objective:     Vital Signs (Most Recent):  Temp: 98.2 °F (36.8 °C) (05/08/24 1200)  Pulse: 79 (05/08/24 1300)  Resp: 19 (05/08/24 1300)  BP: 121/82  (05/08/24 1300)  SpO2: 95 % (05/08/24 1300) Vital Signs (24h Range):  Temp:  [97.6 °F (36.4 °C)-98.2 °F (36.8 °C)] 98.2 °F (36.8 °C)  Pulse:  [] 79  Resp:  [7-25] 19  SpO2:  [91 %-100 %] 95 %  BP: (100-140)/(67-94) 121/82     Weight: 68 kg (150 lb)  Body mass index is 24.21 kg/m².  Body surface area is 1.78 meters squared.    Physical Exam  Constitutional:       Appearance: Normal appearance.   HENT:      Head: Normocephalic.      Mouth/Throat:      Mouth: Mucous membranes are dry.   Eyes:      Pupils: Pupils are equal, round, and reactive to light.   Cardiovascular:      Rate and Rhythm: Regular rhythm. Tachycardia present.   Pulmonary:      Effort: Pulmonary effort is normal.      Comments: Nasal cannula in place  Abdominal:      General: Abdomen is flat. Bowel sounds are normal.   Skin:     General: Skin is warm and dry.   Neurological:      Mental Status: He is alert and oriented to person, place, and time.   Psychiatric:         Mood and Affect: Mood normal.         Significant Labs:   BMP:   Recent Labs   Lab 05/06/24  1716 05/07/24  0153 05/08/24  0332   * 132* 134*   K 4.5 4.2 4.1   CO2 25 23 27   BUN 9.9 10.0 13.3   CREATININE 0.73 0.65* 0.71*   CALCIUM 9.5 9.2 9.7    and CBC:   Recent Labs   Lab 05/06/24 1716 05/07/24  0153 05/08/24  0332   WBC 10.15 7.83 12.24*   HGB 12.7* 12.5* 12.7*   HCT 38.4* 38.2* 39.2*    305 302       Diagnostic Results:  CT: No results found in the last 24 hours.    Assessment/Plan:   Case reviewed with Dr Howard- given patient overall stable clinical status, we will await final biopsy results prior to giving final recommendations regarding radiotherapy.     I did discuss with patient general process of radiation and possible side effects of treatment and he voiced understanding     We will continue to monitor patient and will follow up on pathology results      Thank you for your consult.     Nitza R [place], FNP  Radiation Oncology  [facility]  [facility] - [address] [place] [address]

## 2024-05-09 LAB
ALBUMIN SERPL-MCNC: 3.4 G/DL (ref 3.5–5)
ALBUMIN/GLOB SERPL: 1.1 RATIO (ref 1.1–2)
ALP SERPL-CCNC: 90 UNIT/L (ref 40–150)
ALT SERPL-CCNC: 8 UNIT/L (ref 0–55)
APTT PPP: 106.3 SECONDS (ref 23.2–33.7)
AST SERPL-CCNC: 12 UNIT/L (ref 5–34)
BASOPHILS # BLD AUTO: 0.01 X10(3)/MCL
BASOPHILS NFR BLD AUTO: 0.1 %
BILIRUB SERPL-MCNC: 0.3 MG/DL
BUN SERPL-MCNC: 16.2 MG/DL (ref 8.4–25.7)
CALCIUM SERPL-MCNC: 9.3 MG/DL (ref 8.4–10.2)
CHLORIDE SERPL-SCNC: 97 MMOL/L (ref 98–107)
CO2 SERPL-SCNC: 30 MMOL/L (ref 22–29)
CREAT SERPL-MCNC: 0.76 MG/DL (ref 0.73–1.18)
EOSINOPHIL # BLD AUTO: 0.01 X10(3)/MCL (ref 0–0.9)
EOSINOPHIL NFR BLD AUTO: 0.1 %
ERYTHROCYTE [DISTWIDTH] IN BLOOD BY AUTOMATED COUNT: 12.6 % (ref 11.5–17)
GFR SERPLBLD CREATININE-BSD FMLA CKD-EPI: >60 ML/MIN/1.73/M2
GLOBULIN SER-MCNC: 3 GM/DL (ref 2.4–3.5)
GLUCOSE SERPL-MCNC: 122 MG/DL (ref 74–100)
HCT VFR BLD AUTO: 39 % (ref 42–52)
HGB BLD-MCNC: 12.6 G/DL (ref 14–18)
IMM GRANULOCYTES # BLD AUTO: 0.16 X10(3)/MCL (ref 0–0.04)
IMM GRANULOCYTES NFR BLD AUTO: 1.5 %
LYMPHOCYTES # BLD AUTO: 1.15 X10(3)/MCL (ref 0.6–4.6)
LYMPHOCYTES NFR BLD AUTO: 10.7 %
MAGNESIUM SERPL-MCNC: 2.3 MG/DL (ref 1.6–2.6)
MAYO GENERIC ORDERABLE RESULT: NORMAL
MCH RBC QN AUTO: 31.3 PG (ref 27–31)
MCHC RBC AUTO-ENTMCNC: 32.3 G/DL (ref 33–36)
MCV RBC AUTO: 97 FL (ref 80–94)
MONOCYTES # BLD AUTO: 1.04 X10(3)/MCL (ref 0.1–1.3)
MONOCYTES NFR BLD AUTO: 9.7 %
NEUTROPHILS # BLD AUTO: 8.37 X10(3)/MCL (ref 2.1–9.2)
NEUTROPHILS NFR BLD AUTO: 77.9 %
NRBC BLD AUTO-RTO: 0 %
PHOSPHATE SERPL-MCNC: 3.6 MG/DL (ref 2.3–4.7)
PLATELET # BLD AUTO: 291 X10(3)/MCL (ref 130–400)
PMV BLD AUTO: 10 FL (ref 7.4–10.4)
POCT GLUCOSE: 175 MG/DL (ref 70–110)
POTASSIUM SERPL-SCNC: 4.9 MMOL/L (ref 3.5–5.1)
PROT SERPL-MCNC: 6.4 GM/DL (ref 6.4–8.3)
RBC # BLD AUTO: 4.02 X10(6)/MCL (ref 4.7–6.1)
SODIUM SERPL-SCNC: 133 MMOL/L (ref 136–145)
WBC # SPEC AUTO: 10.74 X10(3)/MCL (ref 4.5–11.5)

## 2024-05-09 PROCEDURE — 83735 ASSAY OF MAGNESIUM: CPT | Performed by: INTERNAL MEDICINE

## 2024-05-09 PROCEDURE — 80053 COMPREHEN METABOLIC PANEL: CPT

## 2024-05-09 PROCEDURE — 25000003 PHARM REV CODE 250

## 2024-05-09 PROCEDURE — 27000221 HC OXYGEN, UP TO 24 HOURS

## 2024-05-09 PROCEDURE — 25000003 PHARM REV CODE 250: Performed by: INTERNAL MEDICINE

## 2024-05-09 PROCEDURE — 36415 COLL VENOUS BLD VENIPUNCTURE: CPT

## 2024-05-09 PROCEDURE — 21400001 HC TELEMETRY ROOM

## 2024-05-09 PROCEDURE — 84100 ASSAY OF PHOSPHORUS: CPT | Performed by: INTERNAL MEDICINE

## 2024-05-09 PROCEDURE — 85025 COMPLETE CBC W/AUTO DIFF WBC: CPT

## 2024-05-09 PROCEDURE — 63600175 PHARM REV CODE 636 W HCPCS

## 2024-05-09 PROCEDURE — 99900031 HC PATIENT EDUCATION (STAT)

## 2024-05-09 PROCEDURE — 85730 THROMBOPLASTIN TIME PARTIAL: CPT | Performed by: INTERNAL MEDICINE

## 2024-05-09 PROCEDURE — 99900035 HC TECH TIME PER 15 MIN (STAT)

## 2024-05-09 PROCEDURE — 63600175 PHARM REV CODE 636 W HCPCS: Performed by: EMERGENCY MEDICINE

## 2024-05-09 PROCEDURE — 94761 N-INVAS EAR/PLS OXIMETRY MLT: CPT

## 2024-05-09 PROCEDURE — 99232 SBSQ HOSP IP/OBS MODERATE 35: CPT | Mod: ,,, | Performed by: INTERNAL MEDICINE

## 2024-05-09 PROCEDURE — 94760 N-INVAS EAR/PLS OXIMETRY 1: CPT

## 2024-05-09 PROCEDURE — 63600175 PHARM REV CODE 636 W HCPCS: Performed by: INTERNAL MEDICINE

## 2024-05-09 PROCEDURE — 25000003 PHARM REV CODE 250: Performed by: STUDENT IN AN ORGANIZED HEALTH CARE EDUCATION/TRAINING PROGRAM

## 2024-05-09 RX ORDER — DEXAMETHASONE 4 MG/1
4 TABLET ORAL EVERY 8 HOURS
Status: DISCONTINUED | OUTPATIENT
Start: 2024-05-09 | End: 2024-05-15 | Stop reason: HOSPADM

## 2024-05-09 RX ORDER — PANTOPRAZOLE SODIUM 40 MG/1
40 TABLET, DELAYED RELEASE ORAL
Status: DISCONTINUED | OUTPATIENT
Start: 2024-05-09 | End: 2024-05-15 | Stop reason: HOSPADM

## 2024-05-09 RX ORDER — ENOXAPARIN SODIUM 100 MG/ML
1 INJECTION SUBCUTANEOUS EVERY 12 HOURS
Status: DISCONTINUED | OUTPATIENT
Start: 2024-05-09 | End: 2024-05-15 | Stop reason: HOSPADM

## 2024-05-09 RX ADMIN — FOLIC ACID 1 MG: 1 TABLET ORAL at 08:05

## 2024-05-09 RX ADMIN — GUAIFENESIN AND DEXTROMETHORPHAN 10 ML: 100; 10 SYRUP ORAL at 05:05

## 2024-05-09 RX ADMIN — GUAIFENESIN AND DEXTROMETHORPHAN 10 ML: 100; 10 SYRUP ORAL at 11:05

## 2024-05-09 RX ADMIN — DEXAMETHASONE SODIUM PHOSPHATE 4 MG: 4 INJECTION, SOLUTION INTRA-ARTICULAR; INTRALESIONAL; INTRAMUSCULAR; INTRAVENOUS; SOFT TISSUE at 05:05

## 2024-05-09 RX ADMIN — DEXAMETHASONE 4 MG: 4 TABLET ORAL at 07:05

## 2024-05-09 RX ADMIN — MUPIROCIN: 20 OINTMENT TOPICAL at 10:05

## 2024-05-09 RX ADMIN — GUAIFENESIN AND DEXTROMETHORPHAN 10 ML: 100; 10 SYRUP ORAL at 12:05

## 2024-05-09 RX ADMIN — DEXAMETHASONE 4 MG: 4 TABLET ORAL at 02:05

## 2024-05-09 RX ADMIN — CEFTRIAXONE SODIUM 2 G: 2 INJECTION, POWDER, FOR SOLUTION INTRAMUSCULAR; INTRAVENOUS at 02:05

## 2024-05-09 RX ADMIN — ENOXAPARIN SODIUM 70 MG: 80 INJECTION SUBCUTANEOUS at 07:05

## 2024-05-09 RX ADMIN — THIAMINE HYDROCHLORIDE 250 MG: 100 INJECTION, SOLUTION INTRAMUSCULAR; INTRAVENOUS at 10:05

## 2024-05-09 RX ADMIN — AZITHROMYCIN MONOHYDRATE 500 MG: 500 INJECTION, POWDER, LYOPHILIZED, FOR SOLUTION INTRAVENOUS at 05:05

## 2024-05-09 RX ADMIN — PANTOPRAZOLE SODIUM 40 MG: 40 TABLET, DELAYED RELEASE ORAL at 08:05

## 2024-05-09 RX ADMIN — OXYCODONE HYDROCHLORIDE AND ACETAMINOPHEN 1 TABLET: 5; 325 TABLET ORAL at 07:05

## 2024-05-09 RX ADMIN — DEXAMETHASONE 4 MG: 4 TABLET ORAL at 08:05

## 2024-05-09 RX ADMIN — ENOXAPARIN SODIUM 70 MG: 80 INJECTION SUBCUTANEOUS at 08:05

## 2024-05-09 NOTE — PROGRESS NOTES
[facility] Hospital Medicine Progress Note        Chief Complaint: Inpatient Follow-up    HPI:   Mr Dinero is a 54 y.o. male who  PMH includes lung mass, neck swelling with voice changes over the past several weeks. PT presented to [facility] ED in [place] for his symptoms and was transferred  to  ED [facility] on 5/6/2024  for higher level of care pulmonary Services.and pulmonary and vascular services.  He has a history of ETOH abuse also. Patient was incidentally found to have right lung mass with concern for SVC syndrome on a CT scan 3 weeks ago, he was scheduled to follow up on the results with his PCP a few days prior to arrival in the ED. He reported he began to have next swelling and change in his voice 2 days before arrival in the ED and progressively got worse prompting ED visit.. Patient also also been having some difficulty swallowing solids, but no problems with liquids, shortness of breath chronically but no acute change, has been having some dizziness.  Patient reported no chest pain, cough, congestion, nausea, vomiting, diarrhea, abdominal pain or any urinary symptoms.  Workup in the ED demonstrated SVC syndrome.  Patient was started on heparin infusion.  Oncology and Hematology Services were consulted with recommendations of admit inpatient to the ICU unit secondary to  high-risk for decompensation in the setting SVC syndrome.  Patient was placed on steroid therapy. He was evaluated by pulmonary Services which he had EBUS done with biopsies.  Patient hemodynamics have been stable.  He has been cleared for transfer out of ICU to the regular floor.  Hospital medicine services have been consulted for assumption of care.     Interval Hx:   Today, Mr Dinero stated he was doing well and had no new complaints. On 5L O2 with adequate saturations. Awaiting biopsy results. Will follow Oncology & Radiation Oncology recommendations.    Case was discussed with patient's nurse on the  floor.    Objective/physical exam:  General: alert male lying comfortably in bed, in no acute distress  HENT: oral and oropharyngeal mucosa moist, pink, with no erythema or exudates, no ear pain or discharge  Neck: normal neck movement, no lymph nodes or swellings, no JVD or Carotid bruit  Respiratory: clear breathing sounds bilaterally, no crackles, rales, ronchi or wheezes  Cardiovascular: clear S1 and S2, no murmurs, rubs or gallops  Peripheral Vascular: no lesions, ulcers or erosions, normal peripheral pulses and no pedal edema  Gastrointestinal: soft, non-tender, non-distended abdomen, no guarding, rigidity or rebound tenderness, normal bowel sounds  Integumentary: normal skin color, no rashes or lesions  Neuro: AAO x 3; motor strength 5/5 in B/L UEs & LEs; sensation intact to gross and fine touch B/L; CN II-XII grossly intact    VITAL SIGNS: 24 HRS MIN & MAX LAST   Temp  Min: 97.3 °F (36.3 °C)  Max: 97.7 °F (36.5 °C) 97.3 °F (36.3 °C)   BP  Min: 106/79  Max: 131/87 127/77   Pulse  Min: 72  Max: 97  74   Resp  Min: 13  Max: 29 16   SpO2  Min: 92 %  Max: 94 % (!) 94 %     I have reviewed the following labs:  Recent Labs   Lab 05/07/24 0153 05/08/24 0332 05/09/24  0520   WBC 7.83 12.24* 10.74   RBC 3.93* 4.01* 4.02*   HGB 12.5* 12.7* 12.6*   HCT 38.2* 39.2* 39.0*   MCV 97.2* 97.8* 97.0*   MCH 31.8* 31.7* 31.3*   MCHC 32.7* 32.4* 32.3*   RDW 12.8 12.7 12.6    302 291   MPV 9.7 10.5* 10.0     Recent Labs   Lab 05/07/24 0153 05/08/24 0332 05/09/24  0520   * 134* 133*   K 4.2 4.1 4.9   CO2 23 27 30*   BUN 10.0 13.3 16.2   CREATININE 0.65* 0.71* 0.76   CALCIUM 9.2 9.7 9.3   MG  --   --  2.30   ALBUMIN 3.0* 3.1* 3.4*   ALKPHOS 110 100 90   ALT 6 7 8   AST 11 11 12   BILITOT 0.2 0.2 0.3     Assessment/Plan:  Acute SVC syndrome-POA   Acute dysphagia-POA   Neck and facial swelling-suspected secondary to SVC syndrome-POA  Lung mass right upper-newly diagnosed- POA  Liver lesion- suspected metastatic  disease- POA  Normocytic Anemia    Continues to be admitted  No new complaints  On 5 L O2 via NC with adequate SaO2  Awaiting biopsy results from EBUS  Oncology on-board; follow recommendations  Radiation Oncology on-board; follow recommendations  Pulmonology on-board; follow recommendations  FD Lovenox BID; IV Heparin to be discontinued  Continued on Rocephin & Azithromycin  On Decadron 4 mg TID  Continuing Folic Acid, Protonix, Thiamine TID  Continue monitoring symptoms    VTE prophylaxis: Lovenox BID    Patient condition:  Stable    Anticipated discharge and Disposition:     Pending    All diagnosis and differential diagnosis have been reviewed; assessment and plan has been documented; I have personally reviewed the labs and test results that are presently available; I have reviewed the patients medication list; I have reviewed the consulting providers response and recommendations. I have reviewed or attempted to review medical records based upon their availability    All of the patient's questions have been  addressed and answered. Patient's is agreeable to the above stated plan. I will continue to monitor closely and make adjustments to medical management as needed.  _____________________________________________________________________    Radiology:  I have personally reviewed the following imaging and agree with the radiologist.     MRI Brain W WO Contrast  Narrative: EXAMINATION:  MRI BRAIN W WO CONTRAST    CLINICAL HISTORY:  Brain metastases suspected;    TECHNIQUE:  Multiplanar multisequence MR imaging of the brain was performed without and with contrast.    COMPARISON:  None    FINDINGS:  The examination is very limited due to artifact.  Large areas of the cerebral convexity on the right and left side are not well evaluated due to the artifact.  No obvious metastatic focus is seen in the cerebellum or the posterior parietal regions or the temporal regions.  No metastatic foci are seen in the occipital  lobes.  No determination can be made about the cerebral convexities due to significant plaque.  Ventricles and basal cisterns appear grossly unremarkable.  There is some cerebral atrophy seen.  Visualized portion of the calvarium appear grossly unremarkable.    No obvious hemorrhage is seen.  Impression: No obvious metastatic disease seen however large areas of the cerebral convexity specially on the right side and to lesser extent left side are not well evaluated on this examination due to artifact    Electronically signed by: Anurag Yates  Date:    05/07/2024  Time:    17:26  Fl Modified Barium Swallow Speech  See procedure notes from Speech Pathologist.    This procedure was auto-finalized.      Nicolas Ellis MD  Department of Hospital Medicine   [facility]   05/09/2024

## 2024-05-09 NOTE — PLAN OF CARE
Problem: Adult Inpatient Plan of Care  Goal: Readiness for Transition of Care  Outcome: Progressing     Problem: Adult Inpatient Plan of Care  Goal: Plan of Care Review  Outcome: Met  Goal: Patient-Specific Goal (Individualized)  Outcome: Met  Goal: Absence of Hospital-Acquired Illness or Injury  Outcome: Met  Goal: Optimal Comfort and Wellbeing  Outcome: Met

## 2024-05-09 NOTE — PROGRESS NOTES
[facility] Acute  Hematology/Oncology  Consult Note    Patient Name: Adán Dinero  MRN: [id number]  Admission Date: 5/6/2024  Hospital Length of Stay: 3 days  Attending Provider: hCio Wagner MD  Consulting Provider: Carlos Cohen MD  Principal Problem:<principal problem not specified>    Consults  Subjective:     HPI:[facility] from [facility] for swelling to L side of neck and change in speech. Pt sent for pulmonology/oncology services for Dx. superior vena cava syndrome per EMS. reports swelling to L neck, muffled speech, headache, throat pressure.      He was seen in the ED and a CT of the head which was normal, CT soft tissue of the neck which showed generalized edema throughout the soft tissue he would right jugular vein engorgement in the CT showed a large mass in the right lung apex right hilum mediastinum 11 cm mass causes obstruction of the SVC with thrombus or tumor thrombus extending into the SVC at the level of the brachiocephalic vein and the mass encases the right pulmonary artery right hilum partially obstructing right upper lobe, there is a postobstructive pneumonia in the right upper lobe and involvement of the brachial plexus adjacent subclavian artery with mass in the right lung apex.  And a right-sided pleural effusion     05/08/2024: Patient lying in ICU comfortably.  He still has some headache and dizziness on turning his neck.  But he states it is better.  No hemoptysis no bleeding today.  Good urine output, less coughing.  Hep B surface antigen negative.  Urine drug screen negative      CT of the abdomen pelvis yesterday with questionable liver metastases as well    MRI brain --motion artifact      05/09/2024:  Patient moved from ICU to the floor.    No bleeding.  Still feels like he has swelling in his neck.    Eating well, no trouble swallowing.  Still with cough.    No new symptoms.        Medications:  Continuous  Infusions:      Scheduled Meds:   azithromycin  500 mg Intravenous Q24H    cefTRIAXone (Rocephin) IV (PEDS and ADULTS)  2 g Intravenous Q24H    dexAMETHasone  4 mg Oral Q8H    dextromethorphan-guaiFENesin  mg/5 ml  10 mL Oral Q6H    enoxparin  1 mg/kg (Dosing Weight) Subcutaneous Q12H (treatment, non-standard time)    folic acid  1 mg Oral Daily    mupirocin   Nasal BID    pantoprazole  40 mg Oral Before breakfast    thiamine (B-1) 250 mg in dextrose 5 % (D5W) 100 mL IVPB  250 mg Intravenous Daily    Followed by    [START ON 5/10/2024] thiamine  100 mg Oral TID     PRN Meds:  Current Facility-Administered Medications:     acetaminophen, 650 mg, Oral, Q6H PRN    benzonatate, 200 mg, Oral, TID PRN    LIDOcaine HCL 4%, 4 mL, Topical (Top), PRN    LORazepam, 2 mg, Oral, Q2H PRN    oxyCODONE-acetaminophen, 1 tablet, Oral, Q6H PRN    oxyCODONE-acetaminophen, 1 tablet, Oral, Q6H PRN    sodium chloride 0.9%, 10 mL, Intravenous, PRN     Review of patient's allergies indicates:  No Known Allergies     Past Medical History:   Diagnosis Date    Lung mass      Past Surgical History:   Procedure Laterality Date    CATARACT EXTRACTION W/  INTRAOCULAR LENS IMPLANT Left 06/28/2023    Procedure: EXTRACTION, CATARACT, WITH IOL INSERTION;  Surgeon: Fritz Cornejo MD;  Location: [facility];  Service: Ophthalmology;  Laterality: Left;    HERNIA REPAIR       Family History    None       Social history   smoker,   alcohol,   Tugboat worker,    Review of Systems   Constitutional:  Positive for fatigue. Negative for fever and unexpected weight change.   HENT:  Positive for congestion and facial swelling.    Respiratory:  Positive for shortness of breath.    Cardiovascular: Negative.    Gastrointestinal: Negative.    Endocrine: Negative.    Genitourinary: Negative.    Musculoskeletal: Negative.    Skin: Negative.    Neurological:  Positive for numbness. Negative for tremors, seizures, syncope and weakness.   Hematological: Negative.     Psychiatric/Behavioral: Negative.       Objective:     Vital Signs (Most Recent):  Temp: 97.5 °F (36.4 °C) (05/09/24 0743)  Pulse: 79 (05/09/24 0743)  Resp: 16 (05/09/24 0403)  BP: 128/76 (05/09/24 0743)  SpO2: (!) 94 % (05/09/24 0754) Vital Signs (24h Range):  Temp:  [97.4 °F (36.3 °C)-98.2 °F (36.8 °C)] 97.5 °F (36.4 °C)  Pulse:  [72-97] 79  Resp:  [7-29] 16  SpO2:  [92 %-100 %] 94 %  BP: (106-131)/(65-87) 128/76     Weight: 68 kg (150 lb)  Body mass index is 24.21 kg/m².  Body surface area is 1.78 meters squared.      Intake/Output Summary (Last 24 hours) at 5/9/2024 0826  Last data filed at 5/9/2024 0803  Gross per 24 hour   Intake 1057.11 ml   Output 1925 ml   Net -867.89 ml       Physical Exam  Vitals reviewed.   Constitutional:       General: He is not in acute distress.  HENT:      Mouth/Throat:      Mouth: Mucous membranes are moist.      Pharynx: Oropharynx is clear.   Eyes:      Extraocular Movements: Extraocular movements intact.      Conjunctiva/sclera: Conjunctivae normal.      Pupils: Pupils are equal, round, and reactive to light.   Neck:      Comments: Left-sided neck vein swelling facial swelling  Cardiovascular:      Rate and Rhythm: Normal rate and regular rhythm.      Pulses: Normal pulses.      Heart sounds: Normal heart sounds.   Pulmonary:      Effort: Pulmonary effort is normal. No accessory muscle usage.      Breath sounds: Decreased air movement present. Examination of the right-middle field reveals decreased breath sounds. Decreased breath sounds present.   Chest:      Comments: Dilated veins, erythema  Abdominal:      General: Abdomen is flat. Bowel sounds are normal. There is no distension.      Palpations: Abdomen is soft.      Comments: Old hernia repair scar   Musculoskeletal:         General: Normal range of motion.   Lymphadenopathy:      Cervical: Cervical adenopathy present.      Upper Body:      Right upper body: No axillary adenopathy.      Left upper body: No axillary  adenopathy.      Comments: Questionable some mild shotty inguinal adenopathy   Skin:     General: Skin is warm and dry.      Comments: Multiple tattoos   Neurological:      General: No focal deficit present.      Mental Status: He is alert and oriented to person, place, and time. Mental status is at baseline.      GCS: GCS eye subscore is 4. GCS verbal subscore is 5. GCS motor subscore is 6.      Cranial Nerves: Cranial nerves 2-12 are intact. No cranial nerve deficit.      Sensory: Sensation is intact.      Motor: No weakness.      Coordination: Coordination is intact.      Comments: He does have some mild numbness in his left hand.       Psychiatric:         Attention and Perception: Attention normal.         Mood and Affect: Mood normal.         Speech: Speech normal.         Behavior: Behavior normal.         Thought Content: Thought content normal.         Significant Labs:     Lab Review:  CBC:   Recent Labs     05/09/24 0520 05/08/24 0332 05/07/24  0153   WBC 10.74 12.24* 7.83   RBC 4.02* 4.01* 3.93*   HGB 12.6* 12.7* 12.5*   HCT 39.0* 39.2* 38.2*    302 305     CMP:   Recent Labs     05/09/24 0520 05/08/24  0332 05/07/24  0153   * 134* 132*   K 4.9 4.1 4.2   CO2 30* 27 23   BUN 16.2 13.3 10.0   CREATININE 0.76 0.71* 0.65*   CALCIUM 9.3 9.7 9.2   LABPROT 6.4 6.8 6.8   ALBUMIN 3.4* 3.1* 3.0*   BILITOT 0.3 0.2 0.2   ALKPHOS 90 100 110   AST 12 11 11   ALT 8 7 6        Diagnostic Results:  I have reviewed all pertinent imaging results/findings within the past 24 hours.          Assessment/Plan:   SVC syndrome,   tumor thrombus  Lung Mass-  Abnormal LN  Pleural effusion  Postobstructive pneumonia  Tobacco use  Alcohol use  Abnormal CT of the liver          I explained to the patient the CT findings and the concern for malignancy.    We discussed the risk of bleeding.    We discussed the risk of withdrawal with his alcohol use   We discussed the need for tissue biopsy.    Has a risk of thrombotic  events and closure of the SVC.  He was having difficulty swallowing as well      REC    Will need Social work to arrange treatment at Home after d/c   Oncology outpatient   And Rad onc  Will try to get first chemo inpatient as well    --once stable move to oncology floor  PICC line for chemo- left side when able    Give Lovenox 1 milligram/kilogram q.12,             3 hours after shot discontinue heparin.                    consider transition to oral meds at discharge   Change dexamethasone to oral   DC Pepcid add Protonix   Discussed potential preliminary of lymphomatous tissue.  Flow cytometry pending,        Carlos Cohen MD  Hematology/Oncology  [facility] - Oncology [facility]

## 2024-05-09 NOTE — ANESTHESIA POSTPROCEDURE EVALUATION
Anesthesia Post Evaluation    Patient: Adán Dinero    Procedure(s) Performed: Procedure(s) (LRB):  ENDOBRONCHIAL ULTRASOUND (EBUS) (N/A)    Final Anesthesia Type: general      Patient location during evaluation: floor  Patient participation: Yes- Able to Participate  Level of consciousness: awake and alert  Post-procedure vital signs: reviewed and stable  Pain management: adequate  Airway patency: patent    PONV status at discharge: No PONV  Anesthetic complications: no      Cardiovascular status: blood pressure returned to baseline  Respiratory status: spontaneous ventilation  Hydration status: euvolemic  Follow-up not needed.              Vitals Value Taken Time   /77 05/09/24 1133   Temp 36.4 °C (97.5 °F) 05/09/24 1133   Pulse 80 05/09/24 1133   Resp 16 05/09/24 0403   SpO2 94 % 05/09/24 0800         No case tracking events are documented in the log.      Pain/Rosalinda Score: Pain Rating Prior to Med Admin: 6 (5/8/2024  8:43 PM)  Pain Rating Post Med Admin: 1 (5/8/2024  9:43 PM)

## 2024-05-10 ENCOUNTER — TELEPHONE (OUTPATIENT)
Dept: HEMATOLOGY/ONCOLOGY | Facility: CLINIC | Age: 54
End: 2024-05-10
Payer: MEDICAID

## 2024-05-10 PROBLEM — C80.1 SMALL CELL CARCINOMA: Status: ACTIVE | Noted: 2024-05-10

## 2024-05-10 LAB
ALBUMIN SERPL-MCNC: 3.3 G/DL (ref 3.5–5)
ALBUMIN/GLOB SERPL: 1.1 RATIO (ref 1.1–2)
ALP SERPL-CCNC: 86 UNIT/L (ref 40–150)
ALT SERPL-CCNC: 9 UNIT/L (ref 0–55)
AST SERPL-CCNC: 13 UNIT/L (ref 5–34)
BASOPHILS # BLD AUTO: 0.02 X10(3)/MCL
BASOPHILS NFR BLD AUTO: 0.2 %
BILIRUB SERPL-MCNC: 0.2 MG/DL
BUN SERPL-MCNC: 13.8 MG/DL (ref 8.4–25.7)
CALCIUM SERPL-MCNC: 9.1 MG/DL (ref 8.4–10.2)
CHLORIDE SERPL-SCNC: 96 MMOL/L (ref 98–107)
CO2 SERPL-SCNC: 29 MMOL/L (ref 22–29)
CREAT SERPL-MCNC: 0.69 MG/DL (ref 0.73–1.18)
EOSINOPHIL # BLD AUTO: 0.01 X10(3)/MCL (ref 0–0.9)
EOSINOPHIL NFR BLD AUTO: 0.1 %
ERYTHROCYTE [DISTWIDTH] IN BLOOD BY AUTOMATED COUNT: 12.5 % (ref 11.5–17)
GFR SERPLBLD CREATININE-BSD FMLA CKD-EPI: >60 ML/MIN/1.73/M2
GLOBULIN SER-MCNC: 3 GM/DL (ref 2.4–3.5)
GLUCOSE SERPL-MCNC: 107 MG/DL (ref 74–100)
HCT VFR BLD AUTO: 38.8 % (ref 42–52)
HGB BLD-MCNC: 12.8 G/DL (ref 14–18)
IMM GRANULOCYTES # BLD AUTO: 0.28 X10(3)/MCL (ref 0–0.04)
IMM GRANULOCYTES NFR BLD AUTO: 2.7 %
LYMPHOCYTES # BLD AUTO: 1.28 X10(3)/MCL (ref 0.6–4.6)
LYMPHOCYTES NFR BLD AUTO: 12.2 %
MCH RBC QN AUTO: 31.4 PG (ref 27–31)
MCHC RBC AUTO-ENTMCNC: 33 G/DL (ref 33–36)
MCV RBC AUTO: 95.3 FL (ref 80–94)
MONOCYTES # BLD AUTO: 0.97 X10(3)/MCL (ref 0.1–1.3)
MONOCYTES NFR BLD AUTO: 9.2 %
NEUTROPHILS # BLD AUTO: 7.97 X10(3)/MCL (ref 2.1–9.2)
NEUTROPHILS NFR BLD AUTO: 75.6 %
NRBC BLD AUTO-RTO: 0 %
PLATELET # BLD AUTO: 295 X10(3)/MCL (ref 130–400)
PMV BLD AUTO: 10 FL (ref 7.4–10.4)
POCT GLUCOSE: 191 MG/DL (ref 70–110)
POTASSIUM SERPL-SCNC: 4.6 MMOL/L (ref 3.5–5.1)
PROT SERPL-MCNC: 6.3 GM/DL (ref 6.4–8.3)
PSYCHE PATHOLOGY RESULT: NORMAL
RBC # BLD AUTO: 4.07 X10(6)/MCL (ref 4.7–6.1)
SODIUM SERPL-SCNC: 133 MMOL/L (ref 136–145)
WBC # SPEC AUTO: 10.53 X10(3)/MCL (ref 4.5–11.5)

## 2024-05-10 PROCEDURE — A4216 STERILE WATER/SALINE, 10 ML: HCPCS | Performed by: STUDENT IN AN ORGANIZED HEALTH CARE EDUCATION/TRAINING PROGRAM

## 2024-05-10 PROCEDURE — 36569 INSJ PICC 5 YR+ W/O IMAGING: CPT

## 2024-05-10 PROCEDURE — 36415 COLL VENOUS BLD VENIPUNCTURE: CPT

## 2024-05-10 PROCEDURE — 25000003 PHARM REV CODE 250: Performed by: INTERNAL MEDICINE

## 2024-05-10 PROCEDURE — 25000003 PHARM REV CODE 250: Performed by: STUDENT IN AN ORGANIZED HEALTH CARE EDUCATION/TRAINING PROGRAM

## 2024-05-10 PROCEDURE — 02HV33Z INSERTION OF INFUSION DEVICE INTO SUPERIOR VENA CAVA, PERCUTANEOUS APPROACH: ICD-10-PCS | Performed by: INTERNAL MEDICINE

## 2024-05-10 PROCEDURE — 85025 COMPLETE CBC W/AUTO DIFF WBC: CPT

## 2024-05-10 PROCEDURE — 63600175 PHARM REV CODE 636 W HCPCS: Performed by: INTERNAL MEDICINE

## 2024-05-10 PROCEDURE — 99233 SBSQ HOSP IP/OBS HIGH 50: CPT | Mod: ,,, | Performed by: INTERNAL MEDICINE

## 2024-05-10 PROCEDURE — 94760 N-INVAS EAR/PLS OXIMETRY 1: CPT

## 2024-05-10 PROCEDURE — 25000003 PHARM REV CODE 250

## 2024-05-10 PROCEDURE — C1751 CATH, INF, PER/CENT/MIDLINE: HCPCS

## 2024-05-10 PROCEDURE — 21400001 HC TELEMETRY ROOM

## 2024-05-10 PROCEDURE — 27000221 HC OXYGEN, UP TO 24 HOURS

## 2024-05-10 PROCEDURE — 80053 COMPREHEN METABOLIC PANEL: CPT

## 2024-05-10 RX ORDER — SODIUM CHLORIDE 0.9 % (FLUSH) 0.9 %
10 SYRINGE (ML) INJECTION
Status: DISCONTINUED | OUTPATIENT
Start: 2024-05-10 | End: 2024-05-15 | Stop reason: HOSPADM

## 2024-05-10 RX ORDER — SODIUM CHLORIDE 0.9 % (FLUSH) 0.9 %
10 SYRINGE (ML) INJECTION EVERY 6 HOURS
Status: DISCONTINUED | OUTPATIENT
Start: 2024-05-10 | End: 2024-05-15 | Stop reason: HOSPADM

## 2024-05-10 RX ORDER — ALLOPURINOL 300 MG/1
300 TABLET ORAL DAILY
Status: DISCONTINUED | OUTPATIENT
Start: 2024-05-10 | End: 2024-05-15 | Stop reason: HOSPADM

## 2024-05-10 RX ADMIN — ALLOPURINOL 300 MG: 300 TABLET ORAL at 02:05

## 2024-05-10 RX ADMIN — OXYCODONE AND ACETAMINOPHEN 1 TABLET: 10; 325 TABLET ORAL at 01:05

## 2024-05-10 RX ADMIN — CEFTRIAXONE SODIUM 2 G: 2 INJECTION, POWDER, FOR SOLUTION INTRAMUSCULAR; INTRAVENOUS at 02:05

## 2024-05-10 RX ADMIN — SODIUM CHLORIDE, PRESERVATIVE FREE 10 ML: 5 INJECTION INTRAVENOUS at 05:05

## 2024-05-10 RX ADMIN — FOLIC ACID 1 MG: 1 TABLET ORAL at 08:05

## 2024-05-10 RX ADMIN — AZITHROMYCIN MONOHYDRATE 500 MG: 500 INJECTION, POWDER, LYOPHILIZED, FOR SOLUTION INTRAVENOUS at 04:05

## 2024-05-10 RX ADMIN — GUAIFENESIN AND DEXTROMETHORPHAN 10 ML: 100; 10 SYRUP ORAL at 05:05

## 2024-05-10 RX ADMIN — DEXAMETHASONE 4 MG: 4 TABLET ORAL at 01:05

## 2024-05-10 RX ADMIN — OXYCODONE AND ACETAMINOPHEN 1 TABLET: 10; 325 TABLET ORAL at 08:05

## 2024-05-10 RX ADMIN — GUAIFENESIN AND DEXTROMETHORPHAN 10 ML: 100; 10 SYRUP ORAL at 01:05

## 2024-05-10 RX ADMIN — GUAIFENESIN AND DEXTROMETHORPHAN 10 ML: 100; 10 SYRUP ORAL at 11:05

## 2024-05-10 RX ADMIN — MUPIROCIN: 20 OINTMENT TOPICAL at 08:05

## 2024-05-10 RX ADMIN — PANTOPRAZOLE SODIUM 40 MG: 40 TABLET, DELAYED RELEASE ORAL at 05:05

## 2024-05-10 RX ADMIN — ENOXAPARIN SODIUM 70 MG: 80 INJECTION SUBCUTANEOUS at 08:05

## 2024-05-10 RX ADMIN — SODIUM CHLORIDE, PRESERVATIVE FREE 10 ML: 5 INJECTION INTRAVENOUS at 11:05

## 2024-05-10 RX ADMIN — DEXAMETHASONE 4 MG: 4 TABLET ORAL at 05:05

## 2024-05-10 RX ADMIN — THIAMINE HCL TAB 100 MG 100 MG: 100 TAB at 08:05

## 2024-05-10 RX ADMIN — GUAIFENESIN AND DEXTROMETHORPHAN 10 ML: 100; 10 SYRUP ORAL at 12:05

## 2024-05-10 RX ADMIN — BENZONATATE 200 MG: 100 CAPSULE ORAL at 08:05

## 2024-05-10 RX ADMIN — DEXAMETHASONE 4 MG: 4 TABLET ORAL at 09:05

## 2024-05-10 RX ADMIN — THIAMINE HCL TAB 100 MG 100 MG: 100 TAB at 02:05

## 2024-05-10 NOTE — PLAN OF CARE
Problem: Adult Inpatient Plan of Care  Goal: Readiness for Transition of Care  Outcome: Progressing  Intervention: Mutually Develop Transition Plan  Flowsheets (Taken 5/10/2024 1718)  Equipment Currently Used at Home: walker, rolling     Problem: Infection  Goal: Absence of Infection Signs and Symptoms  Outcome: Progressing  Intervention: Prevent or Manage Infection  Flowsheets (Taken 5/10/2024 1718)  Fever Reduction/Comfort Measures: lightweight bedding  Isolation Precautions: precautions maintained

## 2024-05-10 NOTE — NURSING
Nurses Note -- 4 Eyes      5/10/2024   10:54 AM      Skin assessed during: Daily Assessment      [x] No Altered Skin Integrity Present    []Prevention Measures Documented      [] Yes- Altered Skin Integrity Present or Discovered   [] LDA Added if Not in Epic (Describe Wound)   [] New Altered Skin Integrity was Present on Admit and Documented in LDA   [] Wound Image Taken    Wound Care Consulted? No    Attending Nurse:  Johnie White RN/Staff Member:  Meena michael rn

## 2024-05-10 NOTE — PROGRESS NOTES
[facility] Hospital Medicine Progress Note        Chief Complaint: Inpatient Follow-up    HPI:   Mr Dinero is a 54 y.o. male who  PMH includes lung mass, neck swelling with voice changes over the past several weeks. PT presented to [facility] ED in [place] for his symptoms and was transferred  to  ED [facility] on 5/6/2024  for higher level of care pulmonary Services.and pulmonary and vascular services.  He has a history of ETOH abuse also. Patient was incidentally found to have right lung mass with concern for SVC syndrome on a CT scan 3 weeks ago, he was scheduled to follow up on the results with his PCP a few days prior to arrival in the ED. He reported he began to have next swelling and change in his voice 2 days before arrival in the ED and progressively got worse prompting ED visit.. Patient also also been having some difficulty swallowing solids, but no problems with liquids, shortness of breath chronically but no acute change, has been having some dizziness.  Patient reported no chest pain, cough, congestion, nausea, vomiting, diarrhea, abdominal pain or any urinary symptoms.  Workup in the ED demonstrated SVC syndrome.  Patient was started on heparin infusion.  Oncology and Hematology Services were consulted with recommendations of admit inpatient to the ICU unit secondary to  high-risk for decompensation in the setting SVC syndrome.  Patient was placed on steroid therapy. He was evaluated by pulmonary Services which he had EBUS done with biopsies.  Patient hemodynamics have been stable.  He has been cleared for transfer out of ICU to the regular floor.  Hospital medicine services have been consulted for assumption of care. Remains on 5 L O2 with adequate SaO2. Switched to FD Lovenox BID from Heparin infusion.    Interval Hx:   Today, Mr Dinero endorsed having worsening B/L UE swelling & chest tightness. Also complained of some dyspnea particularly on exertion. Will get US B/L  UE Venous. Patient instructed to notify team if he starts experiencing worsening dyspnea or feelings of throat tightening/swelling; may need reupgrade to ICU in that case.    Case was discussed with patient's nurse on the floor.    Objective/physical exam:  General: alert male lying comfortably in bed, in no acute distress  HENT: oral and oropharyngeal mucosa moist, pink, with no erythema or exudates, no ear pain or discharge  Neck: normal neck movement, no lymph nodes or swellings, no JVD or Carotid bruit  Respiratory: clear breathing sounds bilaterally, no crackles, rales, ronchi or wheezes  Cardiovascular: clear S1 and S2, no murmurs, rubs or gallops  Peripheral Vascular: no lesions, ulcers or erosions, normal peripheral pulses and no pedal edema  Gastrointestinal: soft, non-tender, non-distended abdomen, no guarding, rigidity or rebound tenderness, normal bowel sounds  Integumentary: normal skin color, no rashes or lesions  Neuro: AAO x 3; motor strength 5/5 in B/L UEs & LEs; sensation intact to gross and fine touch B/L; CN II-XII grossly intact    VITAL SIGNS: 24 HRS MIN & MAX LAST   Temp  Min: 97.3 °F (36.3 °C)  Max: 97.5 °F (36.4 °C) 97.4 °F (36.3 °C)   BP  Min: 120/77  Max: 132/82 132/82   Pulse  Min: 73  Max: 81  75   Resp  Min: 20  Max: 20 20   SpO2  Min: 92 %  Max: 95 % (!) 92 %     I have reviewed the following labs:  Recent Labs   Lab 05/08/24  0332 05/09/24  0520 05/10/24  0356   WBC 12.24* 10.74 10.53   RBC 4.01* 4.02* 4.07*   HGB 12.7* 12.6* 12.8*   HCT 39.2* 39.0* 38.8*   MCV 97.8* 97.0* 95.3*   MCH 31.7* 31.3* 31.4*   MCHC 32.4* 32.3* 33.0   RDW 12.7 12.6 12.5    291 295   MPV 10.5* 10.0 10.0     Recent Labs   Lab 05/08/24  0332 05/09/24  0520 05/10/24  0355   * 133* 133*   K 4.1 4.9 4.6   CO2 27 30* 29   BUN 13.3 16.2 13.8   CREATININE 0.71* 0.76 0.69*   CALCIUM 9.7 9.3 9.1   MG  --  2.30  --    ALBUMIN 3.1* 3.4* 3.3*   ALKPHOS 100 90 86   ALT 7 8 9   AST 11 12 13   BILITOT 0.2 0.3  0.2     Assessment/Plan:  Acute SVC syndrome-POA   B/L UE Swelling 2/2 Above  Acute dysphagia 2/2 SVC Syndrome  Neck and facial swelling-suspected secondary to SVC syndrome-POA  Lung mass right upper-newly diagnosed- POA  Liver lesion- suspected metastatic disease- POA  Normocytic Anemia    Continues to be admitted  Reporting worsening B/L UE swelling with SOB, chest tightness  US Venous B/L UE ordered  On 2 L O2 via NC with adequate SaO2  Awaiting biopsy results from EBUS  Oncology on-board; follow recommendations  Radiation Oncology on-board; follow recommendations  Pulmonology on-board; follow recommendations  FD Lovenox BID  Continued on Rocephin & Azithromycin  On Decadron 4 mg TID  Continuing Folic Acid, Protonix, Thiamine TID  Continue monitoring symptoms    VTE prophylaxis: Lovenox BID    Patient condition:  Stable    Anticipated discharge and Disposition:     Pending    All diagnosis and differential diagnosis have been reviewed; assessment and plan has been documented; I have personally reviewed the labs and test results that are presently available; I have reviewed the patients medication list; I have reviewed the consulting providers response and recommendations. I have reviewed or attempted to review medical records based upon their availability    All of the patient's questions have been  addressed and answered. Patient's is agreeable to the above stated plan. I will continue to monitor closely and make adjustments to medical management as needed.  _____________________________________________________________________    Radiology:  I have personally reviewed the following imaging and agree with the radiologist.     MRI Brain W WO Contrast  Narrative: EXAMINATION:  MRI BRAIN W WO CONTRAST    CLINICAL HISTORY:  Brain metastases suspected;    TECHNIQUE:  Multiplanar multisequence MR imaging of the brain was performed without and with contrast.    COMPARISON:  None    FINDINGS:  The examination is very  limited due to artifact.  Large areas of the cerebral convexity on the right and left side are not well evaluated due to the artifact.  No obvious metastatic focus is seen in the cerebellum or the posterior parietal regions or the temporal regions.  No metastatic foci are seen in the occipital lobes.  No determination can be made about the cerebral convexities due to significant plaque.  Ventricles and basal cisterns appear grossly unremarkable.  There is some cerebral atrophy seen.  Visualized portion of the calvarium appear grossly unremarkable.    No obvious hemorrhage is seen.  Impression: No obvious metastatic disease seen however large areas of the cerebral convexity specially on the right side and to lesser extent left side are not well evaluated on this examination due to artifact    Electronically signed by: Anurag Yates  Date:    05/07/2024  Time:    17:26  Fl Modified Barium Swallow Speech  See procedure notes from Speech Pathologist.    This procedure was auto-finalized.      Nicolas Ellis MD  Department of Hospital Medicine   [facility]   05/10/2024

## 2024-05-10 NOTE — PROGRESS NOTES
[facility]  Hematology/Oncology  Consult Note    Patient Name: Adán Dinero  MRN: [id number]  Admission Date: 5/6/2024  Hospital Length of Stay: 4 days  Attending Provider: Nicolas Ellis MD  Consulting Provider: Carlos Cohen MD  Principal Problem:<principal problem not specified>    Consults  Subjective:     HPI:Pt arrives by [facility], transferred from [facility] for swelling to L side of neck and change in speech. Pt sent for pulmonology/oncology services for Dx. superior vena cava syndrome per EMS. reports swelling to L neck, muffled speech, headache, throat pressure.      He was seen in the ED and a CT of the head which was normal, CT soft tissue of the neck which showed generalized edema throughout the soft tissue he would right jugular vein engorgement in the CT showed a large mass in the right lung apex right hilum mediastinum 11 cm mass causes obstruction of the SVC with thrombus or tumor thrombus extending into the SVC at the level of the brachiocephalic vein and the mass encases the right pulmonary artery right hilum partially obstructing right upper lobe, there is a postobstructive pneumonia in the right upper lobe and involvement of the brachial plexus adjacent subclavian artery with mass in the right lung apex.  And a right-sided pleural effusion     05/08/2024: Patient lying in ICU comfortably.  He still has some headache and dizziness on turning his neck.  But he states it is better.  No hemoptysis no bleeding today.  Good urine output, less coughing.  Hep B surface antigen negative.  Urine drug screen negative      CT of the abdomen pelvis yesterday with questionable liver metastases as well    MRI brain --motion artifact      05/09/2024:  Patient moved from ICU to the floor.    No bleeding.  Still feels like he has swelling in his neck.    Eating well, no trouble swallowing.  Still with cough.    No new symptoms.    Today 05/10/2024: Discussed with  pathology his pathology is certainly malignant.    They are still trying to determine whether not this is small-cell lung carcinoma or lymphoma.  Final staining and testing may not be out till Monday.  He was noted to have  upper extremity swelling.  Ultrasound does not show any evidence of DVT        Medications:  Continuous Infusions:      Scheduled Meds:   azithromycin  500 mg Intravenous Q24H    cefTRIAXone (Rocephin) IV (PEDS and ADULTS)  2 g Intravenous Q24H    dexAMETHasone  4 mg Oral Q8H    dextromethorphan-guaiFENesin  mg/5 ml  10 mL Oral Q6H    enoxparin  1 mg/kg (Dosing Weight) Subcutaneous Q12H (treatment, non-standard time)    folic acid  1 mg Oral Daily    mupirocin   Nasal BID    pantoprazole  40 mg Oral Before breakfast    thiamine  100 mg Oral TID     PRN Meds:  Current Facility-Administered Medications:     acetaminophen, 650 mg, Oral, Q6H PRN    benzonatate, 200 mg, Oral, TID PRN    LIDOcaine HCL 4%, 4 mL, Topical (Top), PRN    LORazepam, 2 mg, Oral, Q2H PRN    oxyCODONE-acetaminophen, 1 tablet, Oral, Q6H PRN    oxyCODONE-acetaminophen, 1 tablet, Oral, Q6H PRN    sodium chloride 0.9%, 10 mL, Intravenous, PRN     Review of patient's allergies indicates:  No Known Allergies     Past Medical History:   Diagnosis Date    Lung mass      Past Surgical History:   Procedure Laterality Date    CATARACT EXTRACTION W/  INTRAOCULAR LENS IMPLANT Left 06/28/2023    Procedure: EXTRACTION, CATARACT, WITH IOL INSERTION;  Surgeon: Fritz Cornejo MD;  Location: [facility];  Service: Ophthalmology;  Laterality: Left;    ENDOBRONCHIAL ULTRASOUND N/A 5/8/2024    Procedure: ENDOBRONCHIAL ULTRASOUND (EBUS);  Surgeon: NICOLETTE Smart MD;  [facility] BRONCHOSCOPY LAB;  Service: Pulmonary;  Laterality: N/A;  4R  /  7L    HERNIA REPAIR       Family History    None       Social history   smoker,   alcohol,   Tugboat worker,    Review of Systems   Constitutional:  Positive for fatigue. Negative for fever and  unexpected weight change.   HENT:  Positive for congestion and facial swelling.    Respiratory:  Positive for shortness of breath.    Cardiovascular: Negative.    Gastrointestinal: Negative.    Endocrine: Negative.    Genitourinary: Negative.    Musculoskeletal: Negative.    Skin: Negative.    Neurological:  Positive for numbness. Negative for tremors, seizures, syncope and weakness.   Hematological: Negative.    Psychiatric/Behavioral: Negative.       Objective:     Vital Signs (Most Recent):  Temp: 97.5 °F (36.4 °C) (05/10/24 1117)  Pulse: 89 (05/10/24 1117)  Resp: 20 (05/10/24 0334)  BP: 121/79 (05/10/24 1117)  SpO2: 95 % (05/10/24 1117) Vital Signs (24h Range):  Temp:  [97.3 °F (36.3 °C)-97.5 °F (36.4 °C)] 97.5 °F (36.4 °C)  Pulse:  [73-89] 89  Resp:  [20] 20  SpO2:  [92 %-95 %] 95 %  BP: (121-132)/(77-87) 121/79     Weight: 68 kg (150 lb)  Body mass index is 24.21 kg/m².  Body surface area is 1.78 meters squared.      Intake/Output Summary (Last 24 hours) at 5/10/2024 1321  Last data filed at 5/10/2024 0626  Gross per 24 hour   Intake 480 ml   Output 1650 ml   Net -1170 ml       Physical Exam  Vitals reviewed.   Constitutional:       General: He is not in acute distress.  HENT:      Mouth/Throat:      Mouth: Mucous membranes are moist.      Pharynx: Oropharynx is clear.   Eyes:      Extraocular Movements: Extraocular movements intact.      Conjunctiva/sclera: Conjunctivae normal.      Pupils: Pupils are equal, round, and reactive to light.   Neck:      Comments: Left-sided neck vein swelling facial swelling  Cardiovascular:      Rate and Rhythm: Normal rate and regular rhythm.      Pulses: Normal pulses.      Heart sounds: Normal heart sounds.   Pulmonary:      Effort: Pulmonary effort is normal. No accessory muscle usage.      Breath sounds: Decreased air movement present. Examination of the right-middle field reveals decreased breath sounds. Decreased breath sounds present.   Chest:      Comments: Dilated  veins, erythema  Abdominal:      General: Abdomen is flat. Bowel sounds are normal. There is no distension.      Palpations: Abdomen is soft.      Comments: Old hernia repair scar   Musculoskeletal:         General: Normal range of motion.   Lymphadenopathy:      Cervical: Cervical adenopathy present.      Upper Body:      Right upper body: No axillary adenopathy.      Left upper body: No axillary adenopathy.      Comments: Questionable some mild shotty inguinal adenopathy   Skin:     General: Skin is warm and dry.      Comments: Multiple tattoos   Neurological:      General: No focal deficit present.      Mental Status: He is alert and oriented to person, place, and time. Mental status is at baseline.      GCS: GCS eye subscore is 4. GCS verbal subscore is 5. GCS motor subscore is 6.      Cranial Nerves: Cranial nerves 2-12 are intact. No cranial nerve deficit.      Sensory: Sensation is intact.      Motor: No weakness.      Coordination: Coordination is intact.      Comments: He does have some mild numbness in his left hand.       Psychiatric:         Attention and Perception: Attention normal.         Mood and Affect: Mood normal.         Speech: Speech normal.         Behavior: Behavior normal.         Thought Content: Thought content normal.         Significant Labs:     Lab Review:  CBC:   Recent Labs     05/10/24  0356 05/09/24  0520 05/08/24  0332   WBC 10.53 10.74 12.24*   RBC 4.07* 4.02* 4.01*   HGB 12.8* 12.6* 12.7*   HCT 38.8* 39.0* 39.2*    291 302     CMP:   Recent Labs     05/10/24  0355 05/09/24  0520 05/08/24  0332   * 133* 134*   K 4.6 4.9 4.1   CO2 29 30* 27   BUN 13.8 16.2 13.3   CREATININE 0.69* 0.76 0.71*   CALCIUM 9.1 9.3 9.7   LABPROT 6.3* 6.4 6.8   ALBUMIN 3.3* 3.4* 3.1*   BILITOT 0.2 0.3 0.2   ALKPHOS 86 90 100   AST 13 12 11   ALT 9 8 7        Diagnostic Results:  I have reviewed all pertinent imaging results/findings within the past 24 hours.          Assessment/Plan:   [facility]  syndrome,   tumor thrombus  Lung Mass-  Abnormal LN  Pleural effusion  Postobstructive pneumonia  Tobacco use  Alcohol use  Abnormal CT of the liver          I explained to the patient the CT findings and the concern for malignancy.    We discussed the risk of bleeding.    We discussed the risk of withdrawal with his alcohol use   We discussed the need for tissue biopsy.    Has a risk of thrombotic events and closure of the SVC.  He was having difficulty swallowing as well      Chemo side effects  The side effects of chemotherapy were discussed.  Including but not limited to: Nausea, vomiting, alopecia, neuropathy, neutropenia, blood transfusion, nephropathy, secondary malignancies, congestive heart failure, allergic reactions to name a few.  patient with the opportunity to have questions answered.            REC  Patient agrees to chemotherapy  Will need Social work to arrange treatment at Home after d/c   Oncology outpatient   Will try to get first chemo inpatient as well    --move to oncology floor  PICC line for chemo- left side  or Midline    Continue Lovenox 1 milligram/kilogram q.12, in hospital                             consider transition to oral meds at discharge    dexamethasone to oral   Continue Protonix   Start allopurinol   Plan chemo  Notify radiation Once pathology is reported   Dr. Rey  on call this weekend    After inpatient chemo   F/u Conrado Cohen MD  Hematology/Oncology  [facility]       Addendum  I discussed with the patient his biopsy  ---small cell  We discussed aggressive malignancy.  We discussed overall poor prognosis of small-cell lung carcinoma discussed without therapy median survival ulcer several months.    Rediscussed chemotherapy  carboplatin etoposide   side effects discussed earlier and again now  Will start carbo/etopside in am if available   Chemo orders placed and sign  Patient agrees to chemo and verbal consent  "today  To proceed  " I am willing to start"  D/w pharmacy and nursing on floor  D/ W Dr Rey as well  Will need outpatient f/ in [place] on d/c  Re consult Rad onc Monday  I would just watch the small liver 9mm lesion at this point     "

## 2024-05-10 NOTE — PROCEDURES
"Adán Dinero is a 54 y.o. male patient.    Temp: 97.4 °F (36.3 °C) (05/10/24 1506)  Pulse: 88 (05/10/24 1506)  Resp: 20 (05/10/24 1338)  BP: 119/80 (05/10/24 1506)  SpO2: (!) 94 % (05/10/24 1506)  Weight: 68 kg (150 lb) (05/06/24 2011)  Height: 5' 6" (167.6 cm) (05/06/24 2011)    PICC  Time out: Immediately prior to procedure a time out was called to verify the correct patient, procedure, equipment, support staff and site/side marked as required  Indications: med administration  Preparation: skin prepped with ChloraPrep  Skin prep agent dried: skin prep agent completely dried prior to procedure  Sterile barriers: all five maximum sterile barriers used - cap, mask, sterile gown, sterile gloves, and large sterile sheet  Hand hygiene: hand hygiene performed prior to central venous catheter insertion  Location details: left basilic  Catheter type: double lumen  Catheter size: 5 Fr  Catheter Length: 40cm    Ultrasound guidance: yes  Needle advanced into vessel with real time Ultrasound guidance.  Guidewire confirmed in vessel.  Sterile sheath used.            Name js  5/10/2024    "

## 2024-05-10 NOTE — NURSING
Nurses Note -- 4 Eyes      5/10/2024   5:19 PM      Skin assessed during: Admit      [x] No Altered Skin Integrity Present    []Prevention Measures Documented      [] Yes- Altered Skin Integrity Present or Discovered   [] LDA Added if Not in Epic (Describe Wound)   [] New Altered Skin Integrity was Present on Admit and Documented in LDA   [] Wound Image Taken    Wound Care Consulted? No    Attending Nurse:  Lesly Vernon RN    Second RN/Staff Member:  Jasmine Allen RN

## 2024-05-10 NOTE — TELEPHONE ENCOUNTER
Spoke to Johnie RN  She was calling to advise of status change on pt; bilateral arm swelling.  Dr. Ellis just rounded and ordered US.    I advised I would share the information and that Dr. Ley will also round on pt this afternoon.

## 2024-05-11 LAB
ABS NEUT (OLG): 10.24 X10(3)/MCL (ref 2.1–9.2)
ALBUMIN SERPL-MCNC: 3.2 G/DL (ref 3.5–5)
ALBUMIN/GLOB SERPL: 1 RATIO (ref 1.1–2)
ALP SERPL-CCNC: 87 UNIT/L (ref 40–150)
ALT SERPL-CCNC: 13 UNIT/L (ref 0–55)
AST SERPL-CCNC: 14 UNIT/L (ref 5–34)
BACTERIA BLD CULT: NORMAL
BACTERIA BLD CULT: NORMAL
BILIRUB SERPL-MCNC: 0.2 MG/DL
BUN SERPL-MCNC: 14.1 MG/DL (ref 8.4–25.7)
CALCIUM SERPL-MCNC: 9 MG/DL (ref 8.4–10.2)
CHLORIDE SERPL-SCNC: 98 MMOL/L (ref 98–107)
CO2 SERPL-SCNC: 30 MMOL/L (ref 22–29)
CREAT SERPL-MCNC: 0.74 MG/DL (ref 0.73–1.18)
ERYTHROCYTE [DISTWIDTH] IN BLOOD BY AUTOMATED COUNT: 12.7 % (ref 11.5–17)
GFR SERPLBLD CREATININE-BSD FMLA CKD-EPI: >60 ML/MIN/1.73/M2
GLOBULIN SER-MCNC: 3.2 GM/DL (ref 2.4–3.5)
GLUCOSE SERPL-MCNC: 117 MG/DL (ref 74–100)
HCT VFR BLD AUTO: 39.1 % (ref 42–52)
HGB BLD-MCNC: 12.7 G/DL (ref 14–18)
INSTRUMENT WBC (OLG): 12.64 X10(3)/MCL
LDH SERPL-CCNC: 370 U/L (ref 125–220)
LYMPHOCYTES NFR BLD MANUAL: 1.52 X10(3)/MCL
LYMPHOCYTES NFR BLD MANUAL: 12 %
MCH RBC QN AUTO: 31.6 PG (ref 27–31)
MCHC RBC AUTO-ENTMCNC: 32.5 G/DL (ref 33–36)
MCV RBC AUTO: 97.3 FL (ref 80–94)
MONOCYTES NFR BLD MANUAL: 0.88 X10(3)/MCL (ref 0.1–1.3)
MONOCYTES NFR BLD MANUAL: 7 %
NEUTROPHILS NFR BLD MANUAL: 81 %
NRBC BLD AUTO-RTO: 0 %
PLATELET # BLD AUTO: 259 X10(3)/MCL (ref 130–400)
PLATELET # BLD EST: NORMAL 10*3/UL
PMV BLD AUTO: 9.8 FL (ref 7.4–10.4)
POTASSIUM SERPL-SCNC: 4.5 MMOL/L (ref 3.5–5.1)
PROT SERPL-MCNC: 6.4 GM/DL (ref 6.4–8.3)
RBC # BLD AUTO: 4.02 X10(6)/MCL (ref 4.7–6.1)
RBC MORPH BLD: NORMAL
SODIUM SERPL-SCNC: 134 MMOL/L (ref 136–145)
URATE SERPL-MCNC: 3.4 MG/DL (ref 3.5–7.2)
WBC # SPEC AUTO: 12.64 X10(3)/MCL (ref 4.5–11.5)

## 2024-05-11 PROCEDURE — 80053 COMPREHEN METABOLIC PANEL: CPT

## 2024-05-11 PROCEDURE — 25000003 PHARM REV CODE 250: Performed by: INTERNAL MEDICINE

## 2024-05-11 PROCEDURE — 63600175 PHARM REV CODE 636 W HCPCS: Performed by: INTERNAL MEDICINE

## 2024-05-11 PROCEDURE — 83615 LACTATE (LD) (LDH) ENZYME: CPT | Performed by: INTERNAL MEDICINE

## 2024-05-11 PROCEDURE — 36415 COLL VENOUS BLD VENIPUNCTURE: CPT

## 2024-05-11 PROCEDURE — 84550 ASSAY OF BLOOD/URIC ACID: CPT | Performed by: INTERNAL MEDICINE

## 2024-05-11 PROCEDURE — 25000003 PHARM REV CODE 250: Performed by: STUDENT IN AN ORGANIZED HEALTH CARE EDUCATION/TRAINING PROGRAM

## 2024-05-11 PROCEDURE — 99233 SBSQ HOSP IP/OBS HIGH 50: CPT | Mod: ,,, | Performed by: INTERNAL MEDICINE

## 2024-05-11 PROCEDURE — A4216 STERILE WATER/SALINE, 10 ML: HCPCS | Performed by: STUDENT IN AN ORGANIZED HEALTH CARE EDUCATION/TRAINING PROGRAM

## 2024-05-11 PROCEDURE — 25000003 PHARM REV CODE 250

## 2024-05-11 PROCEDURE — 3E03305 INTRODUCTION OF OTHER ANTINEOPLASTIC INTO PERIPHERAL VEIN, PERCUTANEOUS APPROACH: ICD-10-PCS | Performed by: INTERNAL MEDICINE

## 2024-05-11 PROCEDURE — 21400001 HC TELEMETRY ROOM

## 2024-05-11 PROCEDURE — 85027 COMPLETE CBC AUTOMATED: CPT

## 2024-05-11 PROCEDURE — 63600175 PHARM REV CODE 636 W HCPCS: Performed by: STUDENT IN AN ORGANIZED HEALTH CARE EDUCATION/TRAINING PROGRAM

## 2024-05-11 RX ORDER — HEPARIN 100 UNIT/ML
500 SYRINGE INTRAVENOUS
Status: DISCONTINUED | OUTPATIENT
Start: 2024-05-11 | End: 2024-05-15 | Stop reason: HOSPADM

## 2024-05-11 RX ORDER — SODIUM CHLORIDE 0.9 % (FLUSH) 0.9 %
10 SYRINGE (ML) INJECTION
Status: DISCONTINUED | OUTPATIENT
Start: 2024-05-11 | End: 2024-05-15 | Stop reason: HOSPADM

## 2024-05-11 RX ORDER — ONDANSETRON HYDROCHLORIDE 2 MG/ML
8 INJECTION, SOLUTION INTRAVENOUS ONCE
Status: COMPLETED | OUTPATIENT
Start: 2024-05-11 | End: 2024-05-11

## 2024-05-11 RX ORDER — POLYETHYLENE GLYCOL 3350 17 G/17G
17 POWDER, FOR SOLUTION ORAL 2 TIMES DAILY PRN
Status: DISCONTINUED | OUTPATIENT
Start: 2024-05-11 | End: 2024-05-15 | Stop reason: HOSPADM

## 2024-05-11 RX ORDER — SENNOSIDES 8.6 MG/1
8.6 TABLET ORAL DAILY PRN
Status: DISCONTINUED | OUTPATIENT
Start: 2024-05-11 | End: 2024-05-15 | Stop reason: HOSPADM

## 2024-05-11 RX ORDER — DIPHENHYDRAMINE HYDROCHLORIDE 50 MG/ML
50 INJECTION INTRAMUSCULAR; INTRAVENOUS ONCE AS NEEDED
Status: DISCONTINUED | OUTPATIENT
Start: 2024-05-11 | End: 2024-05-15 | Stop reason: HOSPADM

## 2024-05-11 RX ORDER — PROCHLORPERAZINE EDISYLATE 5 MG/ML
10 INJECTION INTRAMUSCULAR; INTRAVENOUS EVERY 6 HOURS PRN
Status: DISCONTINUED | OUTPATIENT
Start: 2024-05-11 | End: 2024-05-15 | Stop reason: HOSPADM

## 2024-05-11 RX ORDER — EPINEPHRINE 0.3 MG/.3ML
0.3 INJECTION SUBCUTANEOUS ONCE AS NEEDED
Status: DISCONTINUED | OUTPATIENT
Start: 2024-05-11 | End: 2024-05-15 | Stop reason: HOSPADM

## 2024-05-11 RX ADMIN — ENOXAPARIN SODIUM 70 MG: 80 INJECTION SUBCUTANEOUS at 08:05

## 2024-05-11 RX ADMIN — DEXAMETHASONE 4 MG: 4 TABLET ORAL at 03:05

## 2024-05-11 RX ADMIN — DEXAMETHASONE 4 MG: 4 TABLET ORAL at 05:05

## 2024-05-11 RX ADMIN — THIAMINE HCL TAB 100 MG 100 MG: 100 TAB at 03:05

## 2024-05-11 RX ADMIN — THIAMINE HCL TAB 100 MG 100 MG: 100 TAB at 08:05

## 2024-05-11 RX ADMIN — MUPIROCIN: 20 OINTMENT TOPICAL at 08:05

## 2024-05-11 RX ADMIN — APREPITANT 130 MG: 130 INJECTION, EMULSION INTRAVENOUS at 11:05

## 2024-05-11 RX ADMIN — AZITHROMYCIN MONOHYDRATE 500 MG: 500 INJECTION, POWDER, LYOPHILIZED, FOR SOLUTION INTRAVENOUS at 04:05

## 2024-05-11 RX ADMIN — ONDANSETRON 8 MG: 2 INJECTION INTRAMUSCULAR; INTRAVENOUS at 11:05

## 2024-05-11 RX ADMIN — ETOPOSIDE 178 MG: 20 INJECTION INTRAVENOUS at 12:05

## 2024-05-11 RX ADMIN — FOLIC ACID 1 MG: 1 TABLET ORAL at 08:05

## 2024-05-11 RX ADMIN — DEXAMETHASONE 4 MG: 4 TABLET ORAL at 10:05

## 2024-05-11 RX ADMIN — OXYCODONE AND ACETAMINOPHEN 1 TABLET: 10; 325 TABLET ORAL at 07:05

## 2024-05-11 RX ADMIN — GUAIFENESIN AND DEXTROMETHORPHAN 10 ML: 100; 10 SYRUP ORAL at 11:05

## 2024-05-11 RX ADMIN — OXYCODONE AND ACETAMINOPHEN 1 TABLET: 10; 325 TABLET ORAL at 09:05

## 2024-05-11 RX ADMIN — CARBOPLATIN 670 MG: 10 INJECTION, SOLUTION INTRAVENOUS at 11:05

## 2024-05-11 RX ADMIN — SODIUM CHLORIDE, PRESERVATIVE FREE 10 ML: 5 INJECTION INTRAVENOUS at 05:05

## 2024-05-11 RX ADMIN — SODIUM CHLORIDE, PRESERVATIVE FREE 10 ML: 5 INJECTION INTRAVENOUS at 12:05

## 2024-05-11 RX ADMIN — PANTOPRAZOLE SODIUM 40 MG: 40 TABLET, DELAYED RELEASE ORAL at 05:05

## 2024-05-11 RX ADMIN — GUAIFENESIN AND DEXTROMETHORPHAN 10 ML: 100; 10 SYRUP ORAL at 05:05

## 2024-05-11 RX ADMIN — ALLOPURINOL 300 MG: 300 TABLET ORAL at 08:05

## 2024-05-11 RX ADMIN — CEFTRIAXONE SODIUM 2 G: 2 INJECTION, POWDER, FOR SOLUTION INTRAMUSCULAR; INTRAVENOUS at 03:05

## 2024-05-11 RX ADMIN — SODIUM CHLORIDE, PRESERVATIVE FREE 10 ML: 5 INJECTION INTRAVENOUS at 06:05

## 2024-05-11 NOTE — PLAN OF CARE
Problem: Adult Inpatient Plan of Care  Goal: Readiness for Transition of Care  Outcome: Progressing     Problem: Infection  Goal: Absence of Infection Signs and Symptoms  Outcome: Progressing     Problem: Pain Acute  Goal: Optimal Pain Control and Function  Outcome: Progressing     Problem: Fall Injury Risk  Goal: Absence of Fall and Fall-Related Injury  Outcome: Progressing

## 2024-05-11 NOTE — PROGRESS NOTES
[facility] Hospital Medicine Progress Note        Chief Complaint: Inpatient Follow-up    HPI:   Mr Rosette is a 54 y.o. male who  PMH includes lung mass, neck swelling with voice changes over the past several weeks. PT presented to [facility] ED in [place] for his symptoms and was transferred  to  ED [facility] on 5/6/2024  for higher level of care pulmonary Services.and pulmonary and vascular services.  He has a history of ETOH abuse also. Patient was incidentally found to have right lung mass with concern for SVC syndrome on a CT scan 3 weeks ago, he was scheduled to follow up on the results with his PCP a few days prior to arrival in the ED. He reported he began to have next swelling and change in his voice 2 days before arrival in the ED and progressively got worse prompting ED visit.. Patient also also been having some difficulty swallowing solids, but no problems with liquids, shortness of breath chronically but no acute change, has been having some dizziness.  Patient reported no chest pain, cough, congestion, nausea, vomiting, diarrhea, abdominal pain or any urinary symptoms.  Workup in the ED demonstrated SVC syndrome.  Patient was started on heparin infusion.  Oncology and Hematology Services were consulted with recommendations of admit inpatient to the ICU unit secondary to  high-risk for decompensation in the setting SVC syndrome.  Patient was placed on steroid therapy. He was evaluated by pulmonary Services which he had EBUS done with biopsies.  Patient hemodynamics have been stable.  He has been cleared for transfer out of ICU to the regular floor.  Hospital medicine services have been consulted for assumption of care. Remains on 5 L O2 with adequate SaO2. Switched to FD Lovenox BID from Heparin infusion.    Interval Hx:   No acute events reported overnight.    Seen at bedside this morning patient is started on chemo, patient reported he feels his face, arm swollen, no  shortness on breath at rest, RN present at bedside during my interview   Patient hemodynamically stable    Objective/physical exam:  General: alert male lying comfortably in bed, in no acute distress  HEENT:  Facial swelling  Respiratory:  Unlabored breathing on 2 L nasal cannula  Cardiovascular:  Normal rate  Gastrointestinal:  Soft nontender   Neuro:  Alert and responding appropriately    VITAL SIGNS: 24 HRS MIN & MAX LAST   Temp  Min: 97.4 °F (36.3 °C)  Max: 97.9 °F (36.6 °C) 97.6 °F (36.4 °C)   BP  Min: 116/76  Max: 130/86 116/76   Pulse  Min: 71  Max: 91  78   Resp  Min: 16  Max: 20 16   SpO2  Min: 93 %  Max: 96 % 95 %     I have reviewed the following labs:  Recent Labs   Lab 05/09/24  0520 05/10/24  0356 05/11/24  0406   WBC 10.74 10.53 12.64  12.64*   RBC 4.02* 4.07* 4.02*   HGB 12.6* 12.8* 12.7*   HCT 39.0* 38.8* 39.1*   MCV 97.0* 95.3* 97.3*   MCH 31.3* 31.4* 31.6*   MCHC 32.3* 33.0 32.5*   RDW 12.6 12.5 12.7    295 259   MPV 10.0 10.0 9.8     Recent Labs   Lab 05/09/24  0520 05/10/24  0355 05/11/24  0406   * 133* 134*   K 4.9 4.6 4.5   CO2 30* 29 30*   BUN 16.2 13.8 14.1   CREATININE 0.76 0.69* 0.74   CALCIUM 9.3 9.1 9.0   MG 2.30  --   --    ALBUMIN 3.4* 3.3* 3.2*   ALKPHOS 90 86 87   ALT 8 9 13   AST 12 13 14   BILITOT 0.3 0.2 0.2     Assessment/Plan:  Acute SVC syndrome  B/L UE Swelling  2/2 Above  Acute dysphagia 2/2 SVC Syndrome  Neck and facial swelling-suspected secondary to SVC syndrome-POA  Lung mass right upper-newly diagnosed- POA  Liver lesion- suspected metastatic disease- POA  Normocytic Anemia    Oncology notes reviewed, starting carboplatin and etoposide today 05/11/2024 cycle 1 day 1  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   Radiation Oncology on-board; follow recommendations  Pulmonology on-board; follow recommendations  FD Lovenox BID  Continued on Rocephin & Azithromycin  On Decadron 4 mg TID  Continuing Folic  Acid, Protonix, Thiamine TID  Continue monitoring symptoms  Labs from this morning showed WBC 12.64 K, hemoglobin 12.7, sodium 134, glucose 117, bicarb 30, platelets 259 K  Care discussed with the patient's nurse    VTE prophylaxis: Lovenox BID    Patient condition:  Fair    Anticipated discharge and Disposition:     TBD      _____________________________________________________________________    Radiology:  I have personally reviewed the following imaging and agree with the radiologist.     CV Ultrasound doppler venous arms bilateral  There was no evidence of deep or superficial vein thrombosis identified;    however, Rouleaux flow is demonstrated throughout bilateral upper   extremities.      Faraz Summers MD  Department of Hospital Medicine   [facility]   05/11/2024

## 2024-05-11 NOTE — PROGRESS NOTES
[facility] Oncology Acute  Hematology/Oncology  Consult Note    Patient Name: Adán Dinero  MRN: [id number]  Admission Date: 5/6/2024  Hospital Length of Stay: 5 days  Attending Provider: Nicolas Ellis MD  Consulting Provider: Miguel Rey II, MD  Principal Problem:Small cell carcinoma    Consults  Subjective:     HPI:  Patient arrives by [facility], transferred from [facility] for swelling to L side of neck and change in speech. Pt sent for pulmonology/oncology services for Dx. superior vena cava syndrome per EMS. reports swelling to L neck, muffled speech, headache, throat pressure.      He was seen in the ED and a CT of the head which was normal, CT soft tissue of the neck which showed generalized edema throughout the soft tissue he would right jugular vein engorgement in the CT showed a large mass in the right lung apex right hilum mediastinum 11 cm mass causes obstruction of the SVC with thrombus or tumor thrombus extending into the SVC at the level of the brachiocephalic vein and the mass encases the right pulmonary artery right hilum partially obstructing right upper lobe, there is a postobstructive pneumonia in the right upper lobe and involvement of the brachial plexus adjacent subclavian artery with mass in the right lung apex.  And a right-sided pleural effusion    CT of the abdomen pelvis on 05/06/2024 showed a questionable liver metastases as well.    MRI brain --motion artifact    Patient underwent bronchoscopy on 05/08/2024, there appeared to be endobronchial involvement of mediastinal mass.  Multiple biopsies were taken.  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    Today 05/11/2024:   Patient seen and examined on rounds.  Overall, he states that he was starting to feel the congestion from SVC syndrome.  He states that his face and arm feel full.  He was ready to get started on  chemotherapy.  He talked with Dr. Noel russo yesterday.        Medications:  Continuous Infusions:      Scheduled Meds:   allopurinoL  300 mg Oral Daily    aprepitant  130 mg Intravenous 1 time in Clinic/HOD    azithromycin  500 mg Intravenous Q24H    CARBOplatin (PARAPLATIN) 670 mg in sodium chloride 0.9% 352 mL chemo infusion  670 mg Intravenous 1 time in Clinic/HOD    cefTRIAXone (Rocephin) IV (PEDS and ADULTS)  2 g Intravenous Q24H    dexAMETHasone  4 mg Oral Q8H    dextromethorphan-guaiFENesin  mg/5 ml  10 mL Oral Q6H    enoxparin  1 mg/kg (Dosing Weight) Subcutaneous Q12H (treatment, non-standard time)    etoposide (VEPESID) 100 mg/m2 = 178 mg in sodium chloride 0.9% 573.9 mL chemo infusion  100 mg/m2 (Treatment Plan Recorded) Intravenous Q24H    folic acid  1 mg Oral Daily    mupirocin   Nasal BID    ondansetron  8 mg Intravenous Once    pantoprazole  40 mg Oral Before breakfast    sodium chloride 0.9% 250 mL flush bag   Intravenous 1 time in Clinic/HOD    sodium chloride 0.9%  10 mL Intravenous Q6H    thiamine  100 mg Oral TID     PRN Meds:  Current Facility-Administered Medications:     acetaminophen, 650 mg, Oral, Q6H PRN    alteplase, 2 mg, Intra-Catheter, PRN    benzonatate, 200 mg, Oral, TID PRN    diphenhydrAMINE, 50 mg, Intravenous, Once PRN    EPINEPHrine, 0.3 mg, Intramuscular, Once PRN    heparin, porcine (PF), 500 Units, Intravenous, PRN    hydrocortisone sodium succinate, 100 mg, Intravenous, Once PRN    LIDOcaine HCL 4%, 4 mL, Topical (Top), PRN    LORazepam, 2 mg, Oral, Q2H PRN    oxyCODONE-acetaminophen, 1 tablet, Oral, Q6H PRN    oxyCODONE-acetaminophen, 1 tablet, Oral, Q6H PRN    prochlorperazine, 10 mg, Intravenous, Q6H PRN    sodium chloride 0.9%, 10 mL, Intravenous, PRN    Flushing PICC/Midline Protocol, , , Until Discontinued **AND** sodium chloride 0.9%, 10 mL, Intravenous, Q6H **AND** sodium chloride 0.9%, 10 mL, Intravenous, PRN    sodium chloride 0.9%, 10 mL,  Intravenous, PRN     Review of patient's allergies indicates:  No Known Allergies     Past Medical History:   Diagnosis Date    Lung mass      Past Surgical History:   Procedure Laterality Date    CATARACT EXTRACTION W/  INTRAOCULAR LENS IMPLANT Left 06/28/2023    Procedure: EXTRACTION, CATARACT, WITH IOL INSERTION;  Surgeon: Fritz Cornejo MD;  Location: [facility];  Service: Ophthalmology;  Laterality: Left;    ENDOBRONCHIAL ULTRASOUND N/A 5/8/2024    Procedure: ENDOBRONCHIAL ULTRASOUND (EBUS);  Surgeon: NICOLETTE Smart MD;  Location: [facility]CH[facility];  Service: Pulmonary;  Laterality: N/A;  4R  /  7L    HERNIA REPAIR       Family History    None       Social history   smoker,   alcohol,   Tugboat worker,    Review of Systems   Constitutional:  Positive for fatigue. Negative for fever and unexpected weight change.   HENT:  Positive for congestion and facial swelling.    Respiratory:  Positive for shortness of breath.    Cardiovascular: Negative.    Gastrointestinal: Negative.    Endocrine: Negative.    Genitourinary: Negative.    Musculoskeletal: Negative.    Skin: Negative.    Neurological:  Positive for numbness. Negative for tremors, seizures, syncope and weakness.   Hematological: Negative.    Psychiatric/Behavioral: Negative.       Objective:     Vital Signs (Most Recent):  Temp: 97.6 °F (36.4 °C) (05/11/24 0729)  Pulse: 78 (05/11/24 0729)  Resp: 16 (05/11/24 0729)  BP: 116/76 (05/11/24 0729)  SpO2: 95 % (05/11/24 0729) Vital Signs (24h Range):  Temp:  [97.4 °F (36.3 °C)-97.9 °F (36.6 °C)] 97.6 °F (36.4 °C)  Pulse:  [71-91] 78  Resp:  [16-20] 16  SpO2:  [93 %-96 %] 95 %  BP: (116-130)/(76-87) 116/76     Weight: 68 kg (150 lb)  Body mass index is 24.21 kg/m².  Body surface area is 1.78 meters squared.      Intake/Output Summary (Last 24 hours) at 5/11/2024 0849  Last data filed at 5/11/2024 0753  Gross per 24 hour   Intake 1102 ml   Output 700 ml   Net 402 ml       Physical Exam  Vitals reviewed.    Constitutional:       General: He is not in acute distress.  HENT:      Mouth/Throat:      Mouth: Mucous membranes are moist.      Pharynx: Oropharynx is clear.   Eyes:      Extraocular Movements: Extraocular movements intact.      Conjunctiva/sclera: Conjunctivae normal.      Pupils: Pupils are equal, round, and reactive to light.   Neck:      Comments: Left-sided neck vein swelling facial swelling  Cardiovascular:      Rate and Rhythm: Normal rate and regular rhythm.      Pulses: Normal pulses.      Heart sounds: Normal heart sounds.   Pulmonary:      Effort: Pulmonary effort is normal. No accessory muscle usage.      Breath sounds: Decreased air movement present. Examination of the right-middle field reveals decreased breath sounds. Decreased breath sounds present.   Chest:      Comments: Dilated veins, erythema  Abdominal:      General: Abdomen is flat. Bowel sounds are normal. There is no distension.      Palpations: Abdomen is soft.      Comments: Old hernia repair scar   Musculoskeletal:         General: Normal range of motion.   Lymphadenopathy:      Cervical: Cervical adenopathy present.      Upper Body:      Right upper body: No axillary adenopathy.      Left upper body: No axillary adenopathy.      Comments: Questionable some mild shotty inguinal adenopathy   Skin:     General: Skin is warm and dry.      Comments: Multiple tattoos   Neurological:      General: No focal deficit present.      Mental Status: He is alert and oriented to person, place, and time. Mental status is at baseline.      GCS: GCS eye subscore is 4. GCS verbal subscore is 5. GCS motor subscore is 6.      Cranial Nerves: Cranial nerves 2-12 are intact. No cranial nerve deficit.      Sensory: Sensation is intact.      Motor: No weakness.      Coordination: Coordination is intact.      Comments: He does have some mild numbness in his left hand.       Psychiatric:         Attention and Perception: Attention normal.         Mood and  Affect: Mood normal.         Speech: Speech normal.         Behavior: Behavior normal.         Thought Content: Thought content normal.         Significant Labs:     Lab Review:  CBC:   Recent Labs     05/11/24  0406 05/10/24  0356 05/09/24  0520   WBC 12.64  12.64* 10.53 10.74   RBC 4.02* 4.07* 4.02*   HGB 12.7* 12.8* 12.6*   HCT 39.1* 38.8* 39.0*    295 291     CMP:   Recent Labs     05/11/24  0406 05/10/24  0355 05/09/24  0520   * 133* 133*   K 4.5 4.6 4.9   CO2 30* 29 30*   BUN 14.1 13.8 16.2   CREATININE 0.74 0.69* 0.76   CALCIUM 9.0 9.1 9.3   LABPROT 6.4 6.3* 6.4   ALBUMIN 3.2* 3.3* 3.4*   BILITOT 0.2 0.2 0.3   ALKPHOS 87 86 90   AST 14 13 12   ALT 13 9 8        Diagnostic Results:  I have reviewed all pertinent imaging results/findings within the past 24 hours.          Assessment/Plan:   Small cell lung carcinoma  SVC syndrome,  Tumor thrombus  Pleural effusion  Postobstructive pneumonia  Tobacco use  Alcohol use  Abnormal CT of the liver    PLAN  Dr. Cohen spoke with the patient on 05/10/2024.  He agreed to chemotherapy.  Carboplatin and etoposide plan was entered.    Consent signed on the morning of 05/11/2024 by me in the patient.  PICC line has been placed.    Start carboplatin and etoposide today, 05/11/2024 is cycle 1 day 1.    We will need follow up in [place] on outpatient basis.        Chemo side effects  The side effects of chemotherapy were discussed.  Including but not limited to: Nausea, vomiting, alopecia, neuropathy, neutropenia, blood transfusion, nephropathy, secondary malignancies, congestive heart failure, allergic reactions to name a few.  patient with the opportunity to have questions answered.      Miguel Rey II, MD  Hematology/Oncology  [facility]

## 2024-05-11 NOTE — PROGRESS NOTES
Inpatient Nutrition Evaluation    Admit Date: 5/6/2024   Total duration of encounter: 5 days   Patient Age: 54 y.o.    Nutrition Recommendation/Prescription     Continue regular diet as tolerated  Boost daily to provide 240 kcal and 10 g protein per serving  Monitor labs, intake and weight    Nutrition Assessment     Chart Review    Reason Seen: length of stay    Malnutrition Screening Tool Results   Have you recently lost weight without trying?: No  Have you been eating poorly because of a decreased appetite?: No   MST Score: 0   Diagnosis:  Acute SVC syndrome-POA   B/L UE Swelling 2/2 Above  Neck and facial swelling-suspected secondary to SVC syndrome-POA  Lung mass right upper-newly diagnosed- POA  Liver lesion- suspected metastatic disease- POA  Normocytic Anemia    Relevant Medical History: n/a    Scheduled Medications:  allopurinoL, 300 mg, Daily  azithromycin, 500 mg, Q24H  cefTRIAXone (Rocephin) IV (PEDS and ADULTS), 2 g, Q24H  dexAMETHasone, 4 mg, Q8H  enoxparin, 1 mg/kg (Dosing Weight), Q12H (treatment, non-standard time)  etoposide (VEPESID) 100 mg/m2 = 178 mg in sodium chloride 0.9% 573.9 mL chemo infusion, 100 mg/m2 (Treatment Plan Recorded), Q24H  folic acid, 1 mg, Daily  mupirocin, , BID  pantoprazole, 40 mg, Before breakfast  sodium chloride 0.9% 250 mL flush bag, , 1 time in Clinic/[facility]  sodium chloride 0.9%, 10 mL, Q6H  thiamine, 100 mg, TID    Continuous Infusions:   PRN Medications:   Current Facility-Administered Medications:     acetaminophen, 650 mg, Oral, Q6H PRN    alteplase, 2 mg, Intra-Catheter, PRN    benzonatate, 200 mg, Oral, TID PRN    diphenhydrAMINE, 50 mg, Intravenous, Once PRN    EPINEPHrine, 0.3 mg, Intramuscular, Once PRN    heparin, porcine (PF), 500 Units, Intravenous, PRN    hydrocortisone sodium succinate, 100 mg, Intravenous, Once PRN    LIDOcaine HCL 4%, 4 mL, Topical (Top), PRN    LORazepam, 2 mg, Oral, Q2H PRN    oxyCODONE-acetaminophen, 1 tablet, Oral, Q6H PRN     "oxyCODONE-acetaminophen, 1 tablet, Oral, Q6H PRN    prochlorperazine, 10 mg, Intravenous, Q6H PRN    sodium chloride 0.9%, 10 mL, Intravenous, PRN    Flushing PICC/Midline Protocol, , , Until Discontinued **AND** sodium chloride 0.9%, 10 mL, Intravenous, Q6H **AND** sodium chloride 0.9%, 10 mL, Intravenous, PRN    sodium chloride 0.9%, 10 mL, Intravenous, PRN    Recent Labs   Lab 05/06/24  1716 05/07/24  0153 05/08/24  0332 05/09/24  0520 05/10/24  0355 05/10/24  0356 05/11/24  0406   * 132* 134* 133* 133*  --  134*   K 4.5 4.2 4.1 4.9 4.6  --  4.5   CALCIUM 9.5 9.2 9.7 9.3 9.1  --  9.0   PHOS  --   --   --  3.6  --   --   --    MG  --   --   --  2.30  --   --   --    CHLORIDE 101 102 99 97* 96*  --  98   CO2 25 23 27 30* 29  --  30*   BUN 9.9 10.0 13.3 16.2 13.8  --  14.1   CREATININE 0.73 0.65* 0.71* 0.76 0.69*  --  0.74   EGFRNORACEVR >60 >60 >60 >60 >60  --  >60   GLUCOSE 119* 155* 106* 122* 107*  --  117*   BILITOT 0.3 0.2 0.2 0.3 0.2  --  0.2   ALKPHOS 120 110 100 90 86  --  87   ALT 8 6 7 8 9  --  13   AST 13 11 11 12 13  --  14   ALBUMIN 3.1* 3.0* 3.1* 3.4* 3.3*  --  3.2*   WBC 10.15 7.83 12.24* 10.74  --  10.53 12.64  12.64*   HGB 12.7* 12.5* 12.7* 12.6*  --  12.8* 12.7*   HCT 38.4* 38.2* 39.2* 39.0*  --  38.8* 39.1*     Nutrition Orders:  Diet Adult Regular  Dietary nutrition supplements Boost Original Nutritional Drink - Vanilla; Daily    Appetite/Oral Intake: good/% of meals  Factors Affecting Nutritional Intake: shortness of breath  Food/Mormonism/Cultural Preferences: none reported  Food Allergies: no known food allergies  Last Bowel Movement: 05/06/24  Wound(s):      Comments    5/11: Pt reports good appetite since admission, however sometimes SOB while eating d/t swelling. Agreed to trial ONS daily. Good appetite PTA. Denies GI complaints. Denies unintentional weight loss. Bed weight today of 136#. Weight appears stable per EMR weight history.    Anthropometrics    Height: 5' 6" (167.6 " cm),    Last Weight: 61.7 kg (136 lb) (24 1247), Weight Method: Bed Scale  BMI (Calculated): 22  BMI Classification: normal (BMI 18.5-24.9)        Ideal Body Weight (IBW), Male: 142 lb     % Ideal Body Weight, Male (lb): 105.63 %                 Usual Body Weight (UBW), k kg  % Usual Body Weight: 104.78  % Weight Change From Usual Weight: 4.56 %  Usual Weight Provided By: EMR weight history and patient denies unintentional weight loss    Wt Readings from Last 5 Encounters:   24 61.7 kg (136 lb)   23 59 kg (130 lb)     Weight Change(s) Since Admission:   Wt Readings from Last 1 Encounters:   24 1247 61.7 kg (136 lb)   24 68 kg (150 lb)   24 161 68 kg (150 lb)   Admit Weight: 68 kg (150 lb) (24 1611), Weight Method: Bed Scale    Patient Education     Not applicable.    Nutrition Goals & Monitoring     Dietitian will monitor: food and beverage intake, weight, and gastrointestinal profile    Nutrition Risk/Follow-Up: low (follow-up in 5-7 days)  Patients assigned 'low nutrition risk' status do not qualify for a full nutritional assessment but will be monitored and re-evaluated in a 5-7 day time period. Please consult if re-evaluation needed sooner.

## 2024-05-11 NOTE — PLAN OF CARE
Problem: Adult Inpatient Plan of Care  Goal: Readiness for Transition of Care  Outcome: Progressing     Problem: Infection  Goal: Absence of Infection Signs and Symptoms  Outcome: Progressing  Intervention: Prevent or Manage Infection  Flowsheets (Taken 5/10/2024 2221)  Infection Management: aseptic technique maintained     Problem: Pain Acute  Goal: Optimal Pain Control and Function  Outcome: Progressing  Intervention: Prevent or Manage Pain  Flowsheets (Taken 5/10/2024 2221)  Sleep/Rest Enhancement:   room darkened   regular sleep/rest pattern promoted  Bowel Elimination Promotion: adequate fluid intake promoted  Medication Review/Management: medications reviewed     Problem: Fall Injury Risk  Goal: Absence of Fall and Fall-Related Injury  Outcome: Progressing  Intervention: Identify and Manage Contributors  Flowsheets (Taken 5/10/2024 2221)  Medication Review/Management: medications reviewed  Intervention: Promote Injury-Free Environment  Flowsheets (Taken 5/10/2024 2221)  Safety Promotion/Fall Prevention:   Fall Risk signage in place   assistive device/personal item within reach   side rails raised x 2   nonskid shoes/socks when out of bed

## 2024-05-12 LAB
ALBUMIN SERPL-MCNC: 3.3 G/DL (ref 3.5–5)
ALBUMIN/GLOB SERPL: 1 RATIO (ref 1.1–2)
ALP SERPL-CCNC: 88 UNIT/L (ref 40–150)
ALT SERPL-CCNC: 15 UNIT/L (ref 0–55)
AST SERPL-CCNC: 15 UNIT/L (ref 5–34)
BASOPHILS # BLD AUTO: 0.04 X10(3)/MCL
BASOPHILS NFR BLD AUTO: 0.3 %
BILIRUB SERPL-MCNC: 0.3 MG/DL
BUN SERPL-MCNC: 12.5 MG/DL (ref 8.4–25.7)
CALCIUM SERPL-MCNC: 8.9 MG/DL (ref 8.4–10.2)
CHLORIDE SERPL-SCNC: 99 MMOL/L (ref 98–107)
CO2 SERPL-SCNC: 27 MMOL/L (ref 22–29)
CREAT SERPL-MCNC: 0.65 MG/DL (ref 0.73–1.18)
EOSINOPHIL # BLD AUTO: 0 X10(3)/MCL (ref 0–0.9)
EOSINOPHIL NFR BLD AUTO: 0 %
ERYTHROCYTE [DISTWIDTH] IN BLOOD BY AUTOMATED COUNT: 12.8 % (ref 11.5–17)
GFR SERPLBLD CREATININE-BSD FMLA CKD-EPI: >60 ML/MIN/1.73/M2
GLOBULIN SER-MCNC: 3.4 GM/DL (ref 2.4–3.5)
GLUCOSE SERPL-MCNC: 115 MG/DL (ref 74–100)
HCT VFR BLD AUTO: 40.5 % (ref 42–52)
HGB BLD-MCNC: 13.6 G/DL (ref 14–18)
IMM GRANULOCYTES # BLD AUTO: 0.57 X10(3)/MCL (ref 0–0.04)
IMM GRANULOCYTES NFR BLD AUTO: 4.1 %
LDH SERPL-CCNC: 442 U/L (ref 125–220)
LYMPHOCYTES # BLD AUTO: 1.08 X10(3)/MCL (ref 0.6–4.6)
LYMPHOCYTES NFR BLD AUTO: 7.8 %
MCH RBC QN AUTO: 32.2 PG (ref 27–31)
MCHC RBC AUTO-ENTMCNC: 33.6 G/DL (ref 33–36)
MCV RBC AUTO: 96 FL (ref 80–94)
MONOCYTES # BLD AUTO: 1.14 X10(3)/MCL (ref 0.1–1.3)
MONOCYTES NFR BLD AUTO: 8.3 %
NEUTROPHILS # BLD AUTO: 10.98 X10(3)/MCL (ref 2.1–9.2)
NEUTROPHILS NFR BLD AUTO: 79.5 %
NRBC BLD AUTO-RTO: 0 %
PLATELET # BLD AUTO: 261 X10(3)/MCL (ref 130–400)
PMV BLD AUTO: 10.2 FL (ref 7.4–10.4)
POTASSIUM SERPL-SCNC: 4.5 MMOL/L (ref 3.5–5.1)
PROT SERPL-MCNC: 6.7 GM/DL (ref 6.4–8.3)
RBC # BLD AUTO: 4.22 X10(6)/MCL (ref 4.7–6.1)
SODIUM SERPL-SCNC: 132 MMOL/L (ref 136–145)
URATE SERPL-MCNC: 2.8 MG/DL (ref 3.5–7.2)
WBC # SPEC AUTO: 13.81 X10(3)/MCL (ref 4.5–11.5)

## 2024-05-12 PROCEDURE — 27000221 HC OXYGEN, UP TO 24 HOURS

## 2024-05-12 PROCEDURE — 84550 ASSAY OF BLOOD/URIC ACID: CPT | Performed by: INTERNAL MEDICINE

## 2024-05-12 PROCEDURE — 85025 COMPLETE CBC W/AUTO DIFF WBC: CPT

## 2024-05-12 PROCEDURE — 80053 COMPREHEN METABOLIC PANEL: CPT

## 2024-05-12 PROCEDURE — 99233 SBSQ HOSP IP/OBS HIGH 50: CPT | Mod: ,,, | Performed by: INTERNAL MEDICINE

## 2024-05-12 PROCEDURE — 25000003 PHARM REV CODE 250: Performed by: STUDENT IN AN ORGANIZED HEALTH CARE EDUCATION/TRAINING PROGRAM

## 2024-05-12 PROCEDURE — 25000003 PHARM REV CODE 250

## 2024-05-12 PROCEDURE — 99900031 HC PATIENT EDUCATION (STAT)

## 2024-05-12 PROCEDURE — 25000003 PHARM REV CODE 250: Performed by: INTERNAL MEDICINE

## 2024-05-12 PROCEDURE — 83615 LACTATE (LD) (LDH) ENZYME: CPT | Performed by: INTERNAL MEDICINE

## 2024-05-12 PROCEDURE — 94760 N-INVAS EAR/PLS OXIMETRY 1: CPT

## 2024-05-12 PROCEDURE — 21400001 HC TELEMETRY ROOM

## 2024-05-12 PROCEDURE — 36415 COLL VENOUS BLD VENIPUNCTURE: CPT

## 2024-05-12 PROCEDURE — 63600175 PHARM REV CODE 636 W HCPCS: Performed by: INTERNAL MEDICINE

## 2024-05-12 PROCEDURE — A4216 STERILE WATER/SALINE, 10 ML: HCPCS | Performed by: STUDENT IN AN ORGANIZED HEALTH CARE EDUCATION/TRAINING PROGRAM

## 2024-05-12 PROCEDURE — 99900035 HC TECH TIME PER 15 MIN (STAT)

## 2024-05-12 RX ADMIN — ENOXAPARIN SODIUM 70 MG: 80 INJECTION SUBCUTANEOUS at 08:05

## 2024-05-12 RX ADMIN — MUPIROCIN: 20 OINTMENT TOPICAL at 09:05

## 2024-05-12 RX ADMIN — SODIUM CHLORIDE, PRESERVATIVE FREE 10 ML: 5 INJECTION INTRAVENOUS at 05:05

## 2024-05-12 RX ADMIN — ALLOPURINOL 300 MG: 300 TABLET ORAL at 08:05

## 2024-05-12 RX ADMIN — SODIUM CHLORIDE, PRESERVATIVE FREE 10 ML: 5 INJECTION INTRAVENOUS at 11:05

## 2024-05-12 RX ADMIN — ETOPOSIDE 178 MG: 20 INJECTION INTRAVENOUS at 11:05

## 2024-05-12 RX ADMIN — POLYETHYLENE GLYCOL 3350 17 G: 17 POWDER, FOR SOLUTION ORAL at 06:05

## 2024-05-12 RX ADMIN — SENNOSIDES 8.6 MG: 8.6 TABLET, FILM COATED ORAL at 12:05

## 2024-05-12 RX ADMIN — DEXAMETHASONE 4 MG: 4 TABLET ORAL at 06:05

## 2024-05-12 RX ADMIN — THIAMINE HCL TAB 100 MG 100 MG: 100 TAB at 08:05

## 2024-05-12 RX ADMIN — OXYCODONE AND ACETAMINOPHEN 1 TABLET: 10; 325 TABLET ORAL at 06:05

## 2024-05-12 RX ADMIN — SODIUM CHLORIDE, PRESERVATIVE FREE 10 ML: 5 INJECTION INTRAVENOUS at 06:05

## 2024-05-12 RX ADMIN — PROCHLORPERAZINE EDISYLATE 10 MG: 5 INJECTION INTRAMUSCULAR; INTRAVENOUS at 11:05

## 2024-05-12 RX ADMIN — FOLIC ACID 1 MG: 1 TABLET ORAL at 08:05

## 2024-05-12 RX ADMIN — DEXAMETHASONE 4 MG: 4 TABLET ORAL at 09:05

## 2024-05-12 RX ADMIN — DEXAMETHASONE 4 MG: 4 TABLET ORAL at 02:05

## 2024-05-12 RX ADMIN — SODIUM CHLORIDE, PRESERVATIVE FREE 10 ML: 5 INJECTION INTRAVENOUS at 12:05

## 2024-05-12 RX ADMIN — THIAMINE HCL TAB 100 MG 100 MG: 100 TAB at 03:05

## 2024-05-12 RX ADMIN — PANTOPRAZOLE SODIUM 40 MG: 40 TABLET, DELAYED RELEASE ORAL at 06:05

## 2024-05-12 NOTE — PROGRESS NOTES
[facility] Acute  Hematology/Oncology  Consult Note    Patient Name: Adán Dinero  MRN: [id number]  Admission Date: 5/6/2024  Hospital Length of Stay: 6 days  Attending Provider: Faraz Summers MD  Consulting Provider: Miguel Rey II, MD  Principal Problem:Small cell carcinoma    Consults  Subjective:     HPI:  Patient arrives by [facility], transferred from [facility] for swelling to L side of neck and change in speech. Pt sent for pulmonology/oncology services for Dx. superior vena cava syndrome per EMS. reports swelling to L neck, muffled speech, headache, throat pressure.      He was seen in the ED and a CT of the head which was normal, CT soft tissue of the neck which showed generalized edema throughout the soft tissue he would right jugular vein engorgement in the CT showed a large mass in the right lung apex right hilum mediastinum 11 cm mass causes obstruction of the SVC with thrombus or tumor thrombus extending into the SVC at the level of the brachiocephalic vein and the mass encases the right pulmonary artery right hilum partially obstructing right upper lobe, there is a postobstructive pneumonia in the right upper lobe and involvement of the brachial plexus adjacent subclavian artery with mass in the right lung apex.  And a right-sided pleural effusion    CT of the abdomen pelvis on 05/06/2024 showed a questionable liver metastases as well.    MRI brain --motion artifact    Patient underwent bronchoscopy on 05/08/2024, there appeared to be endobronchial involvement of mediastinal mass.  Multiple biopsies were taken.  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    Today 05/12/2024:   Patient seen and examined on rounds.  He tolerated cycle 1 day 1 of chemotherapy.  He had no major issues with the treatment.  He does continue to have congestion from his SVC syndrome.  He states that  he feels improvement in the back of his neck.        Medications:  Continuous Infusions:      Scheduled Meds:   allopurinoL  300 mg Oral Daily    dexAMETHasone  4 mg Oral Q8H    enoxparin  1 mg/kg (Dosing Weight) Subcutaneous Q12H (treatment, non-standard time)    etoposide (VEPESID) 100 mg/m2 = 178 mg in sodium chloride 0.9% 573.9 mL chemo infusion  100 mg/m2 (Treatment Plan Recorded) Intravenous Q24H    folic acid  1 mg Oral Daily    mupirocin   Nasal BID    pantoprazole  40 mg Oral Before breakfast    sodium chloride 0.9% 250 mL flush bag   Intravenous 1 time in Clinic/HOD    sodium chloride 0.9%  10 mL Intravenous Q6H    thiamine  100 mg Oral TID     PRN Meds:  Current Facility-Administered Medications:     acetaminophen, 650 mg, Oral, Q6H PRN    alteplase, 2 mg, Intra-Catheter, PRN    benzonatate, 200 mg, Oral, TID PRN    diphenhydrAMINE, 50 mg, Intravenous, Once PRN    EPINEPHrine, 0.3 mg, Intramuscular, Once PRN    heparin, porcine (PF), 500 Units, Intravenous, PRN    hydrocortisone sodium succinate, 100 mg, Intravenous, Once PRN    LIDOcaine HCL 4%, 4 mL, Topical (Top), PRN    LORazepam, 2 mg, Oral, Q2H PRN    oxyCODONE-acetaminophen, 1 tablet, Oral, Q6H PRN    oxyCODONE-acetaminophen, 1 tablet, Oral, Q6H PRN    polyethylene glycol, 17 g, Oral, BID PRN    prochlorperazine, 10 mg, Intravenous, Q6H PRN    senna, 8.6 mg, Oral, Daily PRN    sodium chloride 0.9%, 10 mL, Intravenous, PRN    Flushing PICC/Midline Protocol, , , Until Discontinued **AND** sodium chloride 0.9%, 10 mL, Intravenous, Q6H **AND** sodium chloride 0.9%, 10 mL, Intravenous, PRN    sodium chloride 0.9%, 10 mL, Intravenous, PRN     Review of patient's allergies indicates:  No Known Allergies     Past Medical History:   Diagnosis Date    Lung mass      Past Surgical History:   Procedure Laterality Date    CATARACT EXTRACTION W/  INTRAOCULAR LENS IMPLANT Left 06/28/2023    Procedure: EXTRACTION, CATARACT, WITH IOL INSERTION;  Surgeon: Fritz HUNTER  MD Clemente;  Location: [facility];  Service: Ophthalmology;  Laterality: Left;    ENDOBRONCHIAL ULTRASOUND N/A 5/8/2024    Procedure: ENDOBRONCHIAL ULTRASOUND (EBUS);  Surgeon: NICOLETTE Smart MD;  Location: [facility];  Service: Pulmonary;  Laterality: N/A;  4R  /  7L    HERNIA REPAIR       Family History    None       Social history   smoker,   alcohol,   Tugboat worker,    Review of Systems   Constitutional:  Positive for fatigue. Negative for fever and unexpected weight change.   HENT:  Positive for congestion and facial swelling.    Respiratory:  Positive for shortness of breath.    Cardiovascular: Negative.    Gastrointestinal: Negative.    Endocrine: Negative.    Genitourinary: Negative.    Musculoskeletal: Negative.    Skin: Negative.    Neurological:  Positive for numbness. Negative for tremors, seizures, syncope and weakness.   Hematological: Negative.    Psychiatric/Behavioral: Negative.       Objective:     Vital Signs (Most Recent):  Temp: 97.7 °F (36.5 °C) (05/12/24 0428)  Pulse: 64 (05/12/24 0428)  Resp: 18 (05/12/24 0613)  BP: (!) 140/89 (05/12/24 0428)  SpO2: 96 % (05/12/24 0428) Vital Signs (24h Range):  Temp:  [97.6 °F (36.4 °C)-97.7 °F (36.5 °C)] 97.7 °F (36.5 °C)  Pulse:  [64-78] 64  Resp:  [16-19] 18  SpO2:  [93 %-96 %] 96 %  BP: (116-145)/(76-89) 140/89     Weight: 61.7 kg (136 lb)  Body mass index is 21.95 kg/m².  Body surface area is 1.7 meters squared.      Intake/Output Summary (Last 24 hours) at 5/12/2024 0638  Last data filed at 5/12/2024 0331  Gross per 24 hour   Intake 1154 ml   Output 1700 ml   Net -546 ml       Physical Exam  Vitals reviewed.   Constitutional:       General: He is not in acute distress.  HENT:      Mouth/Throat:      Mouth: Mucous membranes are moist.      Pharynx: Oropharynx is clear.   Eyes:      Extraocular Movements: Extraocular movements intact.      Conjunctiva/sclera: Conjunctivae normal.      Pupils: Pupils are equal, round, and reactive to light.    Neck:      Comments: Left-sided neck vein swelling facial swelling  Cardiovascular:      Rate and Rhythm: Normal rate and regular rhythm.      Pulses: Normal pulses.      Heart sounds: Normal heart sounds.   Pulmonary:      Effort: Pulmonary effort is normal. No accessory muscle usage.      Breath sounds: Decreased air movement present. Examination of the right-middle field reveals decreased breath sounds. Decreased breath sounds present.   Chest:      Comments: Dilated veins, erythema  Abdominal:      General: Abdomen is flat. Bowel sounds are normal. There is no distension.      Palpations: Abdomen is soft.      Comments: Old hernia repair scar   Musculoskeletal:         General: Normal range of motion.   Lymphadenopathy:      Cervical: Cervical adenopathy present.      Upper Body:      Right upper body: No axillary adenopathy.      Left upper body: No axillary adenopathy.      Comments: Questionable some mild shotty inguinal adenopathy   Skin:     General: Skin is warm and dry.      Comments: Multiple tattoos   Neurological:      General: No focal deficit present.      Mental Status: He is alert and oriented to person, place, and time. Mental status is at baseline.      GCS: GCS eye subscore is 4. GCS verbal subscore is 5. GCS motor subscore is 6.      Cranial Nerves: Cranial nerves 2-12 are intact. No cranial nerve deficit.      Sensory: Sensation is intact.      Motor: No weakness.      Coordination: Coordination is intact.      Comments: He does have some mild numbness in his left hand.       Psychiatric:         Attention and Perception: Attention normal.         Mood and Affect: Mood normal.         Speech: Speech normal.         Behavior: Behavior normal.         Thought Content: Thought content normal.         Significant Labs:     Lab Review:  CBC:   Recent Labs     05/12/24  0456 05/11/24  0406 05/10/24  0356   WBC 13.81* 12.64  12.64* 10.53   RBC 4.22* 4.02* 4.07*   HGB 13.6* 12.7* 12.8*   HCT 40.5*  39.1* 38.8*    259 295     CMP:   Recent Labs     05/12/24  0456 05/11/24  0406 05/10/24  0355   * 134* 133*   K 4.5 4.5 4.6   CO2 27 30* 29   BUN 12.5 14.1 13.8   CREATININE 0.65* 0.74 0.69*   CALCIUM 8.9 9.0 9.1   LABPROT 6.7 6.4 6.3*   ALBUMIN 3.3* 3.2* 3.3*   BILITOT 0.3 0.2 0.2   ALKPHOS 88 87 86   AST 15 14 13   ALT 15 13 9        Diagnostic Results:  I have reviewed all pertinent imaging results/findings within the past 24 hours.          Assessment/Plan:   Small cell lung carcinoma  SVC syndrome,  Tumor thrombus  Pleural effusion  Postobstructive pneumonia  Tobacco use  Alcohol use  Abnormal CT of the liver    PLAN  Dr. Cohen spoke with the patient on 05/10/2024.  He agreed to chemotherapy.  Carboplatin and etoposide plan was entered.    Consent signed on the morning of 05/11/2024 by me and the patient.  PICC line has been placed.    Started carboplatin and etoposide on 05/11/2024, cycle 1 day 1.    Today cycle 1 day 2, will get etoposide today.    We will need follow up in [place] on outpatient basis.        Chemo side effects  The side effects of chemotherapy were discussed.  Including but not limited to: Nausea, vomiting, alopecia, neuropathy, neutropenia, blood transfusion, nephropathy, secondary malignancies, congestive heart failure, allergic reactions to name a few.  patient with the opportunity to have questions answered.      Miguel Rey II, MD  Hematology/Oncology  [facility]

## 2024-05-12 NOTE — PROGRESS NOTES
[facility] Hospital Medicine Progress Note        Chief Complaint: Inpatient Follow-up    HPI:   Mr Dinero is a 54 y.o. male who  PMH includes lung mass, neck swelling with voice changes over the past several weeks. PT presented to [facility] ED in [place] for his symptoms and was transferred  to  ED [facility] on 5/6/2024  for higher level of care pulmonary Services.and pulmonary and vascular services.  He has a history of ETOH abuse also. Patient was incidentally found to have right lung mass with concern for SVC syndrome on a CT scan 3 weeks ago, he was scheduled to follow up on the results with his PCP a few days prior to arrival in the ED. He reported he began to have next swelling and change in his voice 2 days before arrival in the ED and progressively got worse prompting ED visit.. Patient also also been having some difficulty swallowing solids, but no problems with liquids, shortness of breath chronically but no acute change, has been having some dizziness.  Patient reported no chest pain, cough, congestion, nausea, vomiting, diarrhea, abdominal pain or any urinary symptoms.  Workup in the ED demonstrated SVC syndrome.  Patient was started on heparin infusion.  Oncology and Hematology Services were consulted with recommendations of admit inpatient to the ICU unit secondary to  high-risk for decompensation in the setting SVC syndrome.  Patient was placed on steroid therapy. He was evaluated by pulmonary Services which he had EBUS done with biopsies.  Patient hemodynamics have been stable.  He has been cleared for transfer out of ICU to the regular floor.  Hospital medicine services have been consulted for assumption of care.     After downgrading to the floor patient's oxygen requirements improved, Heme-Onc team evaluated the patient, patient was started on carboplatin and etoposide  05/11/2024 cycle 1 day 1 ,Pathology resulted small-cell carcinoma grade 3 neuroendocrine carcinoma  with extensive necrosis .        Interval Hx:   No acute events reported overnight.    Seen at bedside this morning , patient reported feeling improved reported swelling in the neck improved but reported intermittent dyspnea on exertion with activities/showering,ambulation     Patient hemodynamically stable    Objective/physical exam:  General: alert male lying comfortably in bed, in no acute distress  HEENT:  Facial swelling  Respiratory:  Unlabored breathing on 2 L nasal cannula  Cardiovascular:  Normal rate  Gastrointestinal:  Soft nontender   Neuro:  Alert and responding appropriately    VITAL SIGNS: 24 HRS MIN & MAX LAST   Temp  Min: 97.6 °F (36.4 °C)  Max: 97.7 °F (36.5 °C) 97.6 °F (36.4 °C)   BP  Min: 123/82  Max: 145/87 131/87   Pulse  Min: 64  Max: 72  64   Resp  Min: 18  Max: 20 20   SpO2  Min: 93 %  Max: 96 % 95 %     I have reviewed the following labs:  Recent Labs   Lab 05/10/24  0356 05/11/24  0406 05/12/24  0456   WBC 10.53 12.64  12.64* 13.81*   RBC 4.07* 4.02* 4.22*   HGB 12.8* 12.7* 13.6*   HCT 38.8* 39.1* 40.5*   MCV 95.3* 97.3* 96.0*   MCH 31.4* 31.6* 32.2*   MCHC 33.0 32.5* 33.6   RDW 12.5 12.7 12.8    259 261   MPV 10.0 9.8 10.2     Recent Labs   Lab 05/09/24  0520 05/10/24  0355 05/11/24  0406 05/12/24  0456   * 133* 134* 132*   K 4.9 4.6 4.5 4.5   CO2 30* 29 30* 27   BUN 16.2 13.8 14.1 12.5   CREATININE 0.76 0.69* 0.74 0.65*   CALCIUM 9.3 9.1 9.0 8.9   MG 2.30  --   --   --    ALBUMIN 3.4* 3.3* 3.2* 3.3*   ALKPHOS 90 86 87 88   ALT 8 9 13 15   AST 12 13 14 15   BILITOT 0.3 0.2 0.2 0.3     Assessment/Plan:  Acute SVC syndrome  B/L UE Swelling  2/2 Above  Acute dysphagia 2/2 SVC Syndrome  Neck and facial swelling-suspected secondary to SVC syndrome-POA  Lung mass right upper-newly diagnosed- POA  Liver lesion- suspected metastatic disease- POA  Normocytic Anemia    Oncology notes reviewed, chemo cycle 1 day 2  Pathology resulted small-cell carcinoma grade 3 neuroendocrine  carcinoma with extensive necrosis   Radiation Oncology on-board; follow recommendations  Pulmonology on-board; follow recommendations  FD Lovenox BID  Received total 6 days of Rocephin & Azithromycin  On Decadron 4 mg TID  Continuing Folic Acid, Protonix, Thiamine TID  Continue monitoring symptoms  Labs from this morning showed WBC 13.81 K, hemoglobin 13.6, sodium 132, glucose 115, creatinine 0.65  Care discussed with the patient's nurse    VTE prophylaxis: Lovenox BID    Patient condition:  Fair    Anticipated discharge and Disposition:     TBD      _____________________________________________________________________    Radiology:  I have personally reviewed the following imaging and agree with the radiologist.     CV Ultrasound doppler venous arms bilateral  There was no evidence of deep or superficial vein thrombosis identified;    however, Rouleaux flow is demonstrated throughout bilateral upper   extremities.      Faraz Summers MD  Department of Hospital Medicine   [facility]   05/12/2024

## 2024-05-13 LAB
ALBUMIN SERPL-MCNC: 3.3 G/DL (ref 3.5–5)
ALBUMIN/GLOB SERPL: 1.1 RATIO (ref 1.1–2)
ALP SERPL-CCNC: 84 UNIT/L (ref 40–150)
ALT SERPL-CCNC: 15 UNIT/L (ref 0–55)
AST SERPL-CCNC: 14 UNIT/L (ref 5–34)
BASOPHILS # BLD AUTO: 0.03 X10(3)/MCL
BASOPHILS NFR BLD AUTO: 0.2 %
BILIRUB SERPL-MCNC: 0.3 MG/DL
BUN SERPL-MCNC: 14.6 MG/DL (ref 8.4–25.7)
CALCIUM SERPL-MCNC: 8.8 MG/DL (ref 8.4–10.2)
CHLORIDE SERPL-SCNC: 98 MMOL/L (ref 98–107)
CO2 SERPL-SCNC: 27 MMOL/L (ref 22–29)
CREAT SERPL-MCNC: 0.63 MG/DL (ref 0.73–1.18)
EOSINOPHIL # BLD AUTO: 0 X10(3)/MCL (ref 0–0.9)
EOSINOPHIL NFR BLD AUTO: 0 %
ERYTHROCYTE [DISTWIDTH] IN BLOOD BY AUTOMATED COUNT: 13 % (ref 11.5–17)
GFR SERPLBLD CREATININE-BSD FMLA CKD-EPI: >60 ML/MIN/1.73/M2
GLOBULIN SER-MCNC: 2.9 GM/DL (ref 2.4–3.5)
GLUCOSE SERPL-MCNC: 108 MG/DL (ref 74–100)
HCT VFR BLD AUTO: 41.9 % (ref 42–52)
HGB BLD-MCNC: 14.2 G/DL (ref 14–18)
IMM GRANULOCYTES # BLD AUTO: 0.29 X10(3)/MCL (ref 0–0.04)
IMM GRANULOCYTES NFR BLD AUTO: 2.2 %
LDH SERPL-CCNC: 490 U/L (ref 125–220)
LYMPHOCYTES # BLD AUTO: 1.06 X10(3)/MCL (ref 0.6–4.6)
LYMPHOCYTES NFR BLD AUTO: 7.9 %
MCH RBC QN AUTO: 32.3 PG (ref 27–31)
MCHC RBC AUTO-ENTMCNC: 33.9 G/DL (ref 33–36)
MCV RBC AUTO: 95.4 FL (ref 80–94)
MONOCYTES # BLD AUTO: 1.1 X10(3)/MCL (ref 0.1–1.3)
MONOCYTES NFR BLD AUTO: 8.2 %
NEUTROPHILS # BLD AUTO: 10.91 X10(3)/MCL (ref 2.1–9.2)
NEUTROPHILS NFR BLD AUTO: 81.5 %
NRBC BLD AUTO-RTO: 0 %
OHS QRS DURATION: 74 MS
OHS QTC CALCULATION: 395 MS
PLATELET # BLD AUTO: 206 X10(3)/MCL (ref 130–400)
PMV BLD AUTO: 11.9 FL (ref 7.4–10.4)
POCT GLUCOSE: 136 MG/DL (ref 70–110)
POTASSIUM SERPL-SCNC: 4.9 MMOL/L (ref 3.5–5.1)
PROT SERPL-MCNC: 6.2 GM/DL (ref 6.4–8.3)
RBC # BLD AUTO: 4.39 X10(6)/MCL (ref 4.7–6.1)
SODIUM SERPL-SCNC: 134 MMOL/L (ref 136–145)
URATE SERPL-MCNC: 2.8 MG/DL (ref 3.5–7.2)
WBC # SPEC AUTO: 13.39 X10(3)/MCL (ref 4.5–11.5)

## 2024-05-13 PROCEDURE — 25000003 PHARM REV CODE 250

## 2024-05-13 PROCEDURE — 25000003 PHARM REV CODE 250: Performed by: STUDENT IN AN ORGANIZED HEALTH CARE EDUCATION/TRAINING PROGRAM

## 2024-05-13 PROCEDURE — 63600175 PHARM REV CODE 636 W HCPCS: Performed by: INTERNAL MEDICINE

## 2024-05-13 PROCEDURE — 99233 SBSQ HOSP IP/OBS HIGH 50: CPT | Mod: ,,, | Performed by: INTERNAL MEDICINE

## 2024-05-13 PROCEDURE — 99900035 HC TECH TIME PER 15 MIN (STAT)

## 2024-05-13 PROCEDURE — 94760 N-INVAS EAR/PLS OXIMETRY 1: CPT

## 2024-05-13 PROCEDURE — A4216 STERILE WATER/SALINE, 10 ML: HCPCS | Performed by: STUDENT IN AN ORGANIZED HEALTH CARE EDUCATION/TRAINING PROGRAM

## 2024-05-13 PROCEDURE — 93005 ELECTROCARDIOGRAM TRACING: CPT

## 2024-05-13 PROCEDURE — 80053 COMPREHEN METABOLIC PANEL: CPT | Performed by: INTERNAL MEDICINE

## 2024-05-13 PROCEDURE — 85025 COMPLETE CBC W/AUTO DIFF WBC: CPT

## 2024-05-13 PROCEDURE — 84550 ASSAY OF BLOOD/URIC ACID: CPT | Performed by: INTERNAL MEDICINE

## 2024-05-13 PROCEDURE — 25000003 PHARM REV CODE 250: Performed by: INTERNAL MEDICINE

## 2024-05-13 PROCEDURE — 36415 COLL VENOUS BLD VENIPUNCTURE: CPT | Performed by: INTERNAL MEDICINE

## 2024-05-13 PROCEDURE — 21400001 HC TELEMETRY ROOM

## 2024-05-13 PROCEDURE — 27000221 HC OXYGEN, UP TO 24 HOURS

## 2024-05-13 PROCEDURE — 83615 LACTATE (LD) (LDH) ENZYME: CPT | Performed by: INTERNAL MEDICINE

## 2024-05-13 RX ADMIN — ALLOPURINOL 300 MG: 300 TABLET ORAL at 08:05

## 2024-05-13 RX ADMIN — SODIUM CHLORIDE, PRESERVATIVE FREE 10 ML: 5 INJECTION INTRAVENOUS at 11:05

## 2024-05-13 RX ADMIN — ETOPOSIDE 178 MG: 20 INJECTION INTRAVENOUS at 01:05

## 2024-05-13 RX ADMIN — DEXAMETHASONE 4 MG: 4 TABLET ORAL at 05:05

## 2024-05-13 RX ADMIN — THIAMINE HCL TAB 100 MG 100 MG: 100 TAB at 02:05

## 2024-05-13 RX ADMIN — OXYCODONE HYDROCHLORIDE AND ACETAMINOPHEN 1 TABLET: 5; 325 TABLET ORAL at 09:05

## 2024-05-13 RX ADMIN — ENOXAPARIN SODIUM 70 MG: 80 INJECTION SUBCUTANEOUS at 09:05

## 2024-05-13 RX ADMIN — ENOXAPARIN SODIUM 70 MG: 80 INJECTION SUBCUTANEOUS at 08:05

## 2024-05-13 RX ADMIN — SODIUM CHLORIDE, PRESERVATIVE FREE 10 ML: 5 INJECTION INTRAVENOUS at 05:05

## 2024-05-13 RX ADMIN — THIAMINE HCL TAB 100 MG 100 MG: 100 TAB at 09:05

## 2024-05-13 RX ADMIN — OXYCODONE HYDROCHLORIDE AND ACETAMINOPHEN 1 TABLET: 5; 325 TABLET ORAL at 12:05

## 2024-05-13 RX ADMIN — THIAMINE HCL TAB 100 MG 100 MG: 100 TAB at 08:05

## 2024-05-13 RX ADMIN — PANTOPRAZOLE SODIUM 40 MG: 40 TABLET, DELAYED RELEASE ORAL at 05:05

## 2024-05-13 RX ADMIN — BENZONATATE 200 MG: 100 CAPSULE ORAL at 09:05

## 2024-05-13 RX ADMIN — DEXAMETHASONE 4 MG: 4 TABLET ORAL at 09:05

## 2024-05-13 RX ADMIN — FOLIC ACID 1 MG: 1 TABLET ORAL at 08:05

## 2024-05-13 RX ADMIN — DEXAMETHASONE 4 MG: 4 TABLET ORAL at 02:05

## 2024-05-13 RX ADMIN — SODIUM CHLORIDE, PRESERVATIVE FREE 10 ML: 5 INJECTION INTRAVENOUS at 12:05

## 2024-05-13 NOTE — PLAN OF CARE
Patient is interested with home health services once discharged from the hospital.  Patient will be provided with home [facility] information when cleared for discharge and approved by MD.

## 2024-05-13 NOTE — PROGRESS NOTES
[facility] Hospital Medicine Progress Note        Chief Complaint: Inpatient Follow-up     HPI:   Mr Dinero is a 54 y.o. male who  PMH includes lung mass, neck swelling with voice changes over the past several weeks. PT presented to [facility] ED in [place] for his symptoms and was transferred  to  ED [facility] on 5/6/2024  for higher level of care pulmonary Services.and pulmonary and vascular services.  He has a history of ETOH abuse also. Patient was incidentally found to have right lung mass with concern for SVC syndrome on a CT scan 3 weeks ago, he was scheduled to follow up on the results with his PCP a few days prior to arrival in the ED. He reported he began to have next swelling and change in his voice 2 days before arrival in the ED and progressively got worse prompting ED visit.. Patient also also been having some difficulty swallowing solids, but no problems with liquids, shortness of breath chronically but no acute change, has been having some dizziness.  Patient reported no chest pain, cough, congestion, nausea, vomiting, diarrhea, abdominal pain or any urinary symptoms.  Workup in the ED demonstrated SVC syndrome.  Patient was started on heparin infusion.  Oncology and Hematology Services were consulted with recommendations of admit inpatient to the ICU unit secondary to  high-risk for decompensation in the setting SVC syndrome.  Patient was placed on steroid therapy. He was evaluated by pulmonary Services which he had EBUS done with biopsies.  Patient hemodynamics have been stable.  He has been cleared for transfer out of ICU to the regular floor.  Hospital medicine services have been consulted for assumption of care.      After downgrading to the floor patient's oxygen requirements improved, Heme-Onc team evaluated the patient, patient was started on carboplatin and etoposide  05/11/2024 cycle 1 day 1 ,Pathology resulted small-cell carcinoma grade 3 neuroendocrine  carcinoma with extensive necrosis .           Interval Hx:   No acute events reported overnight.    patient reported feeling improved reported swelling in the neck improved but reported intermittent dyspnea on exertion with activities/showering,ambulation  Vitals reviewed and stable on 2 L of oxygen   Labs reviewed and stable   Patient to continue with chemotherapy with etoposide today   Radiation oncology evaluated patient no indication for radiotherapy at this time patient was currently stable        Patient hemodynamically stable     Objective/physical exam:  General: alert male lying comfortably in bed, in no acute distress  HEENT:  Facial swelling  Respiratory:  Unlabored breathing on 2 L nasal cannula  Cardiovascular:  Normal rate  Gastrointestinal:  Soft nontender   Neuro:  Alert and responding appropriately     Assessment/Plan:  Acute SVC syndrome  B/L UE Swelling  2/2 Above  Acute dysphagia 2/2 SVC Syndrome  Neck and facial swelling-suspected secondary to SVC syndrome-POA  Lung mass right upper-newly diagnosed- POA  Liver lesion- suspected metastatic disease- POA  Normocytic Anemia  Postobstructive pneumonia status post completed antibiotics  Tumour thrombus on lovenox        Plan  Patient to continue with chemotherapy with etoposide today   Radiation oncology evaluated patient no indication for radiotherapy at this time patient was currently stable   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   Pulmonology on-board; follow recommendations  FD Lovenox BID  Received total 6 days of Rocephin & Azithromycin  On Decadron 4 mg TID  Continuing Folic Acid, Protonix, Thiamine TID  Continue monitoring symptoms  Care discussed with the patient's nurse and       VTE prophylaxis: Lovenox BID     Patient condition:  Fair     Anticipated discharge and Disposition:     TBD          VITAL SIGNS: 24 HRS MIN & MAX LAST   Temp  Min: 97.5 °F (36.4 °C)  Max: 97.8  °F (36.6 °C) 97.8 °F (36.6 °C)   BP  Min: 122/72  Max: 138/87 126/74   Pulse  Min: 66  Max: 81  77   Resp  Min: 18  Max: 20 20   SpO2  Min: 94 %  Max: 96 % 95 %     I have reviewed the following labs:  Recent Labs   Lab 05/11/24  0406 05/12/24 0456 05/13/24  0410   WBC 12.64  12.64* 13.81* 13.39*   RBC 4.02* 4.22* 4.39*   HGB 12.7* 13.6* 14.2   HCT 39.1* 40.5* 41.9*   MCV 97.3* 96.0* 95.4*   MCH 31.6* 32.2* 32.3*   MCHC 32.5* 33.6 33.9   RDW 12.7 12.8 13.0    261 206   MPV 9.8 10.2 11.9*     Recent Labs   Lab 05/09/24  0520 05/10/24  0355 05/11/24  0406 05/12/24  0456 05/13/24  0410   *   < > 134* 132* 134*   K 4.9   < > 4.5 4.5 4.9   CO2 30*   < > 30* 27 27   BUN 16.2   < > 14.1 12.5 14.6   CREATININE 0.76   < > 0.74 0.65* 0.63*   CALCIUM 9.3   < > 9.0 8.9 8.8   MG 2.30  --   --   --   --    ALBUMIN 3.4*   < > 3.2* 3.3* 3.3*   ALKPHOS 90   < > 87 88 84   ALT 8   < > 13 15 15   AST 12   < > 14 15 14   BILITOT 0.3   < > 0.2 0.3 0.3    < > = values in this interval not displayed.     Microbiology Results (last 7 days)       Procedure Component Value Units Date/Time    Blood Culture [[id number]]  (Normal) Collected: 05/06/24 1849    Order Status: Completed Specimen: Blood Updated: 05/11/24 2000     Blood Culture No Growth at 5 days    Blood Culture [[id number]]  (Normal) Collected: 05/06/24 1849    Order Status: Completed Specimen: Blood Updated: 05/11/24 2000     Blood Culture No Growth at 5 days             See below for Radiology    Scheduled Med:   allopurinoL  300 mg Oral Daily    dexAMETHasone  4 mg Oral Q8H    enoxparin  1 mg/kg (Dosing Weight) Subcutaneous Q12H (treatment, non-standard time)    etoposide (VEPESID) 100 mg/m2 = 178 mg in sodium chloride 0.9% 573.9 mL chemo infusion  100 mg/m2 (Treatment Plan Recorded) Intravenous Q24H    folic acid  1 mg Oral Daily    pantoprazole  40 mg Oral Before breakfast    sodium chloride 0.9% 250 mL flush bag   Intravenous 1 time in Clinic/HOD    sodium  chloride 0.9%  10 mL Intravenous Q6H    thiamine  100 mg Oral TID      Continuous Infusions:     PRN Meds:    Current Facility-Administered Medications:     acetaminophen, 650 mg, Oral, Q6H PRN    alteplase, 2 mg, Intra-Catheter, PRN    benzonatate, 200 mg, Oral, TID PRN    diphenhydrAMINE, 50 mg, Intravenous, Once PRN    EPINEPHrine, 0.3 mg, Intramuscular, Once PRN    heparin, porcine (PF), 500 Units, Intravenous, PRN    hydrocortisone sodium succinate, 100 mg, Intravenous, Once PRN    LIDOcaine HCL 4%, 4 mL, Topical (Top), PRN    LORazepam, 2 mg, Oral, Q2H PRN    oxyCODONE-acetaminophen, 1 tablet, Oral, Q6H PRN    oxyCODONE-acetaminophen, 1 tablet, Oral, Q6H PRN    polyethylene glycol, 17 g, Oral, BID PRN    prochlorperazine, 10 mg, Intravenous, Q6H PRN    senna, 8.6 mg, Oral, Daily PRN    sodium chloride 0.9%, 10 mL, Intravenous, PRN    Flushing PICC/Midline Protocol, , , Until Discontinued **AND** sodium chloride 0.9%, 10 mL, Intravenous, Q6H **AND** sodium chloride 0.9%, 10 mL, Intravenous, PRN    sodium chloride 0.9%, 10 mL, Intravenous, PRN     Assessment/Plan:      VTE prophylaxis:     Patient condition:  Stable/Fair/Guarded/ Serious/ Critical    Anticipated discharge and Disposition:         All diagnosis and differential diagnosis have been reviewed; assessment and plan has been documented; I have personally reviewed the labs and test results that are presently available; I have reviewed the patients medication list; I have reviewed the consulting providers response and recommendations. I have reviewed or attempted to review medical records based upon their availability    All of the patient's questions have been  addressed and answered. Patient's is agreeable to the above stated plan. I will continue to monitor closely and make adjustments to medical management as needed.  _____________________________________________________________________    Nutrition Status:    Radiology:  I have personally reviewed  the following imaging and agree with the radiologist.     X-Ray Chest 1 View for Line/Tube Placement  Narrative: EXAMINATION:  Single view chest radiograph.    CLINICAL HISTORY:  No blood return;    TECHNIQUE:  Single view of the chest.    COMPARISON:  Chest radiograph 05/10/2024.    FINDINGS:  The left upper extremity PICC is unchanged with its tip in the SVC.  The right perihilar and right upper lobe airspace opacities are unchanged.  The left lung is clear.  There is no pneumothorax.  The cardiac silhouette is normal in size.  Impression: No significant change from prior exam.    Electronically signed by: Mark Lofton MD  Date:    05/12/2024  Time:    10:04      Carmen Martinez MD  Department of Hospital Medicine   [facility]   05/13/2024

## 2024-05-13 NOTE — PLAN OF CARE
Referral faxed to [facility] at [phone].  Will continue to follow.

## 2024-05-13 NOTE — PROGRESS NOTES
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 Currently receiving inpatient chemotherapy per med/onc. Clinically stable at this time with regards to his SVC syndrome- O2 sats remaining in the mid-high 90s. Case discussed with Dr Cherry Howard, no need for radiotherapy at this time.     We are available as needed should there be a change in clinical status.       Nitza POPE, FNP-C  Radiation Oncology

## 2024-05-13 NOTE — PROGRESS NOTES
[facility] - Oncology Acute  Hematology/Oncology  Consult Note    Patient Name: Adán Dinero  MRN: [id number]  Admission Date: 5/6/2024  Hospital Length of Stay: 7 days  Attending Provider: Faraz Summers MD  Consulting Provider: Miguel Rey II, MD  Principal Problem:Small cell carcinoma    Consults  Subjective:     HPI:  Patient arrives by [facility], transferred from [facility] for swelling to L side of neck and change in speech. Pt sent for pulmonology/oncology services for Dx. superior vena cava syndrome per EMS. reports swelling to L neck, muffled speech, headache, throat pressure.      He was seen in the ED and a CT of the head which was normal, CT soft tissue of the neck which showed generalized edema throughout the soft tissue he would right jugular vein engorgement in the CT showed a large mass in the right lung apex right hilum mediastinum 11 cm mass causes obstruction of the SVC with thrombus or tumor thrombus extending into the SVC at the level of the brachiocephalic vein and the mass encases the right pulmonary artery right hilum partially obstructing right upper lobe, there is a postobstructive pneumonia in the right upper lobe and involvement of the brachial plexus adjacent subclavian artery with mass in the right lung apex.  And a right-sided pleural effusion    CT of the abdomen pelvis on 05/06/2024 showed a questionable liver metastases as well.    MRI brain --motion artifact    Patient underwent bronchoscopy on 05/08/2024, there appeared to be endobronchial involvement of mediastinal mass.  Multiple biopsies were taken.  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    Today 05/13/2024:   Patient seen and examined on rounds.  He states that he still has some fullness in his neck, but otherwise is tolerating treatment and feels better than he did a few days  ago.        Medications:  Continuous Infusions:      Scheduled Meds:   allopurinoL  300 mg Oral Daily    dexAMETHasone  4 mg Oral Q8H    enoxparin  1 mg/kg (Dosing Weight) Subcutaneous Q12H (treatment, non-standard time)    etoposide (VEPESID) 100 mg/m2 = 178 mg in sodium chloride 0.9% 573.9 mL chemo infusion  100 mg/m2 (Treatment Plan Recorded) Intravenous Q24H    folic acid  1 mg Oral Daily    pantoprazole  40 mg Oral Before breakfast    sodium chloride 0.9% 250 mL flush bag   Intravenous 1 time in Clinic/HOD    sodium chloride 0.9%  10 mL Intravenous Q6H    thiamine  100 mg Oral TID     PRN Meds:  Current Facility-Administered Medications:     acetaminophen, 650 mg, Oral, Q6H PRN    alteplase, 2 mg, Intra-Catheter, PRN    benzonatate, 200 mg, Oral, TID PRN    diphenhydrAMINE, 50 mg, Intravenous, Once PRN    EPINEPHrine, 0.3 mg, Intramuscular, Once PRN    heparin, porcine (PF), 500 Units, Intravenous, PRN    hydrocortisone sodium succinate, 100 mg, Intravenous, Once PRN    LIDOcaine HCL 4%, 4 mL, Topical (Top), PRN    LORazepam, 2 mg, Oral, Q2H PRN    oxyCODONE-acetaminophen, 1 tablet, Oral, Q6H PRN    oxyCODONE-acetaminophen, 1 tablet, Oral, Q6H PRN    polyethylene glycol, 17 g, Oral, BID PRN    prochlorperazine, 10 mg, Intravenous, Q6H PRN    senna, 8.6 mg, Oral, Daily PRN    sodium chloride 0.9%, 10 mL, Intravenous, PRN    Flushing PICC/Midline Protocol, , , Until Discontinued **AND** sodium chloride 0.9%, 10 mL, Intravenous, Q6H **AND** sodium chloride 0.9%, 10 mL, Intravenous, PRN    sodium chloride 0.9%, 10 mL, Intravenous, PRN     Review of patient's allergies indicates:  No Known Allergies     Past Medical History:   Diagnosis Date    Lung mass      Past Surgical History:   Procedure Laterality Date    CATARACT EXTRACTION W/  INTRAOCULAR LENS IMPLANT Left 06/28/2023    Procedure: EXTRACTION, CATARACT, WITH IOL INSERTION;  Surgeon: Fritz Cornejo MD;  [facility];  Service: Ophthalmology;   Laterality: Left;    ENDOBRONCHIAL ULTRASOUND N/A 5/8/2024    Procedure: ENDOBRONCHIAL ULTRASOUND (EBUS);  Surgeon: NICOLETTE Smart MD;  [facility] BRONCH[facility];  Service: Pulmonary;  Laterality: N/A;  4R  /  7L    HERNIA REPAIR       Family History    None       Social history   smoker,   alcohol,   Tugboat worker,    Review of Systems   Constitutional:  Positive for fatigue. Negative for fever and unexpected weight change.   HENT:  Positive for congestion and facial swelling.    Respiratory:  Positive for shortness of breath.    Cardiovascular: Negative.    Gastrointestinal: Negative.    Endocrine: Negative.    Genitourinary: Negative.    Musculoskeletal: Negative.    Skin: Negative.    Neurological:  Positive for numbness. Negative for tremors, seizures, syncope and weakness.   Hematological: Negative.    Psychiatric/Behavioral: Negative.       Objective:     Vital Signs (Most Recent):  Temp: 97.8 °F (36.6 °C) (05/13/24 1138)  Pulse: 77 (05/13/24 1138)  Resp: 20 (05/13/24 0715)  BP: 126/74 (05/13/24 1138)  SpO2: 95 % (05/13/24 1138) Vital Signs (24h Range):  Temp:  [97.5 °F (36.4 °C)-97.8 °F (36.6 °C)] 97.8 °F (36.6 °C)  Pulse:  [66-81] 77  Resp:  [18-20] 20  SpO2:  [94 %-96 %] 95 %  BP: (122-138)/(70-89) 126/74     Weight: 61.7 kg (136 lb)  Body mass index is 21.95 kg/m².  Body surface area is 1.7 meters squared.      Intake/Output Summary (Last 24 hours) at 5/13/2024 1217  Last data filed at 5/13/2024 0500  Gross per 24 hour   Intake 1162 ml   Output 2500 ml   Net -1338 ml       Physical Exam  Vitals reviewed.   Constitutional:       General: He is not in acute distress.  HENT:      Mouth/Throat:      Mouth: Mucous membranes are moist.      Pharynx: Oropharynx is clear.   Eyes:      Extraocular Movements: Extraocular movements intact.      Conjunctiva/sclera: Conjunctivae normal.      Pupils: Pupils are equal, round, and reactive to light.   Neck:      Comments: Left-sided neck vein swelling facial  swelling  Cardiovascular:      Rate and Rhythm: Normal rate and regular rhythm.      Pulses: Normal pulses.      Heart sounds: Normal heart sounds.   Pulmonary:      Effort: Pulmonary effort is normal. No accessory muscle usage.      Breath sounds: Decreased air movement present. Examination of the right-middle field reveals decreased breath sounds. Decreased breath sounds present.   Chest:      Comments: Dilated veins, erythema  Abdominal:      General: Abdomen is flat. Bowel sounds are normal. There is no distension.      Palpations: Abdomen is soft.      Comments: Old hernia repair scar   Musculoskeletal:         General: Normal range of motion.   Lymphadenopathy:      Cervical: Cervical adenopathy present.      Upper Body:      Right upper body: No axillary adenopathy.      Left upper body: No axillary adenopathy.      Comments: Questionable some mild shotty inguinal adenopathy   Skin:     General: Skin is warm and dry.      Comments: Multiple tattoos   Neurological:      General: No focal deficit present.      Mental Status: He is alert and oriented to person, place, and time. Mental status is at baseline.      GCS: GCS eye subscore is 4. GCS verbal subscore is 5. GCS motor subscore is 6.      Cranial Nerves: Cranial nerves 2-12 are intact. No cranial nerve deficit.      Sensory: Sensation is intact.      Motor: No weakness.      Coordination: Coordination is intact.      Comments: He does have some mild numbness in his left hand.       Psychiatric:         Attention and Perception: Attention normal.         Mood and Affect: Mood normal.         Speech: Speech normal.         Behavior: Behavior normal.         Thought Content: Thought content normal.         Significant Labs:     Lab Review:  CBC:   Recent Labs     05/13/24  0410 05/12/24  0456 05/11/24  0406   WBC 13.39* 13.81* 12.64  12.64*   RBC 4.39* 4.22* 4.02*   HGB 14.2 13.6* 12.7*   HCT 41.9* 40.5* 39.1*    261 259     CMP:   Recent Labs      05/13/24  0410 05/12/24  0456 05/11/24  0406   * 132* 134*   K 4.9 4.5 4.5   CO2 27 27 30*   BUN 14.6 12.5 14.1   CREATININE 0.63* 0.65* 0.74   CALCIUM 8.8 8.9 9.0   LABPROT 6.2* 6.7 6.4   ALBUMIN 3.3* 3.3* 3.2*   BILITOT 0.3 0.3 0.2   ALKPHOS 84 88 87   AST 14 15 14   ALT 15 15 13        Diagnostic Results:  I have reviewed all pertinent imaging results/findings within the past 24 hours.          Assessment/Plan:   Small cell lung carcinoma  SVC syndrome,  Tumor thrombus  Pleural effusion  Postobstructive pneumonia  Tobacco use  Alcohol use  Abnormal CT of the liver    PLAN  Dr. Cohen spoke with the patient on 05/10/2024.  He agreed to chemotherapy.  Carboplatin and etoposide plan was entered.    Consent signed on the morning of 05/11/2024 by me and the patient.  PICC line has been placed.    Started carboplatin and etoposide on 05/11/2024, cycle 1 day 1.    Today cycle 1 day 3, will get etoposide today.    We will continue to monitor closely.  No need for radiation at this time.  If the patient does not have some improvement over the next few days, we may consider radiation.    We will need follow up in [place] on outpatient basis.        Chemo side effects  The side effects of chemotherapy were discussed.  Including but not limited to: Nausea, vomiting, alopecia, neuropathy, neutropenia, blood transfusion, nephropathy, secondary malignancies, congestive heart failure, allergic reactions to name a few.  patient with the opportunity to have questions answered.      Miguel Rey II, MD  Hematology/Oncology  [facility] - Oncology [facility]

## 2024-05-14 PROCEDURE — 94760 N-INVAS EAR/PLS OXIMETRY 1: CPT

## 2024-05-14 PROCEDURE — 63600175 PHARM REV CODE 636 W HCPCS: Performed by: INTERNAL MEDICINE

## 2024-05-14 PROCEDURE — 25000003 PHARM REV CODE 250: Performed by: STUDENT IN AN ORGANIZED HEALTH CARE EDUCATION/TRAINING PROGRAM

## 2024-05-14 PROCEDURE — 99233 SBSQ HOSP IP/OBS HIGH 50: CPT | Mod: ,,, | Performed by: INTERNAL MEDICINE

## 2024-05-14 PROCEDURE — A4216 STERILE WATER/SALINE, 10 ML: HCPCS | Performed by: STUDENT IN AN ORGANIZED HEALTH CARE EDUCATION/TRAINING PROGRAM

## 2024-05-14 PROCEDURE — 99900035 HC TECH TIME PER 15 MIN (STAT)

## 2024-05-14 PROCEDURE — 27000221 HC OXYGEN, UP TO 24 HOURS

## 2024-05-14 PROCEDURE — 25000003 PHARM REV CODE 250: Performed by: INTERNAL MEDICINE

## 2024-05-14 PROCEDURE — 25000003 PHARM REV CODE 250

## 2024-05-14 PROCEDURE — 21400001 HC TELEMETRY ROOM

## 2024-05-14 RX ADMIN — SODIUM CHLORIDE, PRESERVATIVE FREE 10 ML: 5 INJECTION INTRAVENOUS at 11:05

## 2024-05-14 RX ADMIN — DEXAMETHASONE 4 MG: 4 TABLET ORAL at 09:05

## 2024-05-14 RX ADMIN — THIAMINE HCL TAB 100 MG 100 MG: 100 TAB at 08:05

## 2024-05-14 RX ADMIN — ALLOPURINOL 300 MG: 300 TABLET ORAL at 08:05

## 2024-05-14 RX ADMIN — ENOXAPARIN SODIUM 70 MG: 80 INJECTION SUBCUTANEOUS at 08:05

## 2024-05-14 RX ADMIN — PANTOPRAZOLE SODIUM 40 MG: 40 TABLET, DELAYED RELEASE ORAL at 05:05

## 2024-05-14 RX ADMIN — SODIUM CHLORIDE, PRESERVATIVE FREE 10 ML: 5 INJECTION INTRAVENOUS at 05:05

## 2024-05-14 RX ADMIN — OXYCODONE HYDROCHLORIDE AND ACETAMINOPHEN 1 TABLET: 5; 325 TABLET ORAL at 05:05

## 2024-05-14 RX ADMIN — THIAMINE HCL TAB 100 MG 100 MG: 100 TAB at 02:05

## 2024-05-14 RX ADMIN — OXYCODONE HYDROCHLORIDE AND ACETAMINOPHEN 1 TABLET: 5; 325 TABLET ORAL at 08:05

## 2024-05-14 RX ADMIN — ENOXAPARIN SODIUM 70 MG: 80 INJECTION SUBCUTANEOUS at 09:05

## 2024-05-14 RX ADMIN — SODIUM CHLORIDE, PRESERVATIVE FREE 10 ML: 5 INJECTION INTRAVENOUS at 12:05

## 2024-05-14 RX ADMIN — DEXAMETHASONE 4 MG: 4 TABLET ORAL at 05:05

## 2024-05-14 RX ADMIN — FOLIC ACID 1 MG: 1 TABLET ORAL at 08:05

## 2024-05-14 RX ADMIN — DEXAMETHASONE 4 MG: 4 TABLET ORAL at 02:05

## 2024-05-14 NOTE — PROGRESS NOTES
[facility] Hospital Medicine Progress Note      Chief Complaint: Inpatient Follow-up     HPI:   Mr Dinero is a 54 y.o. male who  PMH includes lung mass, neck swelling with voice changes over the past several weeks. PT presented to [facility] ED in [place] for his symptoms and was transferred  to  ED [facility] on 5/6/2024  for higher level of care pulmonary Services.and pulmonary and vascular services.  He has a history of ETOH abuse also. Patient was incidentally found to have right lung mass with concern for SVC syndrome on a CT scan 3 weeks ago, he was scheduled to follow up on the results with his PCP a few days prior to arrival in the ED. He reported he began to have next swelling and change in his voice 2 days before arrival in the ED and progressively got worse prompting ED visit.. Patient also also been having some difficulty swallowing solids, but no problems with liquids, shortness of breath chronically but no acute change, has been having some dizziness.  Patient reported no chest pain, cough, congestion, nausea, vomiting, diarrhea, abdominal pain or any urinary symptoms.  Workup in the ED demonstrated SVC syndrome.  Patient was started on heparin infusion.  Oncology and Hematology Services were consulted with recommendations of admit inpatient to the ICU unit secondary to  high-risk for decompensation in the setting SVC syndrome.  Patient was placed on steroid therapy. He was evaluated by pulmonary Services which he had EBUS done with biopsies.  Patient hemodynamics have been stable.  He has been cleared for transfer out of ICU to the regular floor.  Hospital medicine services have been consulted for assumption of care.      After downgrading to the floor patient's oxygen requirements improved, Heme-Onc team evaluated the patient, patient was started on carboplatin and etoposide  05/11/2024 cycle 1 day 1 ,Pathology resulted small-cell carcinoma grade 3 neuroendocrine carcinoma  with extensive necrosis .Radiation oncology evaluated patient no indication for radiotherapy at this time patient was currently stable    Patient has completed antibiotics , and is cleared of pneumonia   Requires home oxygen            Interval Hx:   No acute events reported overnight.    Vitals reviewed and stable  No new complaints , CRYSTAL PERSISTS   Patient has completed antibiotics , and is cleared of pneumonia   Requires home oxygen      Patient hemodynamically stable     Objective/physical exam:  General: alert male lying comfortably in bed, in no acute distress  HEENT:  Facial swelling  Respiratory:  Unlabored breathing on 2 L nasal cannula  Cardiovascular:  Normal rate  Gastrointestinal:  Soft nontender   Neuro:  Alert and responding appropriately     Assessment/Plan:  Acute hypoxic resp failure - multifactorial  - right lung cancer, tumour thrombus   Acute SVC syndrome  B/L UE Swelling  2/2 Above  Acute dysphagia 2/2 SVC Syndrome  Neck and facial swelling-suspected secondary to SVC syndrome-POA  Lung mass right upper-newly diagnosed- POA  Liver lesion- suspected metastatic disease- POA  Normocytic Anemia  Postobstructive pneumonia status post completed antibiotics  Tumour thrombus on lovenox         Plan  Patient has completed antibiotics , and is cleared of pneumonia   Requires home oxygen   O2 saturation of 87% on room air   Patient to continue with chemotherapy with etoposide today per oncology  Radiation oncology evaluated patient no indication for radiotherapy at this time patient was currently stable   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   Pulmonology on-board; follow recommendations  FD Lovenox BID  Received total 6 days of Rocephin & Azithromycin  On Decadron 4 mg TID  Continuing Folic Acid, Protonix, Thiamine TID  Continue monitoring symptoms  Care discussed with the patient's nurse and       VTE prophylaxis: Lovenox BID      Patient condition:  Fair     Anticipated discharge and Disposition:     Likely discharge in 24 hrs        VITAL SIGNS: 24 HRS MIN & MAX LAST   Temp  Min: 97.5 °F (36.4 °C)  Max: 98.5 °F (36.9 °C) 98.5 °F (36.9 °C)   BP  Min: 113/72  Max: 125/87 117/75   Pulse  Min: 66  Max: 91  77   Resp  Min: 18  Max: 20 18   SpO2  Min: 95 %  Max: 98 % 97 %     I have reviewed the following labs:  Recent Labs   Lab 05/11/24  0406 05/12/24 0456 05/13/24  0410   WBC 12.64  12.64* 13.81* 13.39*   RBC 4.02* 4.22* 4.39*   HGB 12.7* 13.6* 14.2   HCT 39.1* 40.5* 41.9*   MCV 97.3* 96.0* 95.4*   MCH 31.6* 32.2* 32.3*   MCHC 32.5* 33.6 33.9   RDW 12.7 12.8 13.0    261 206   MPV 9.8 10.2 11.9*     Recent Labs   Lab 05/09/24  0520 05/10/24  0355 05/11/24 0406 05/12/24  0456 05/13/24  0410   *   < > 134* 132* 134*   K 4.9   < > 4.5 4.5 4.9   CO2 30*   < > 30* 27 27   BUN 16.2   < > 14.1 12.5 14.6   CREATININE 0.76   < > 0.74 0.65* 0.63*   CALCIUM 9.3   < > 9.0 8.9 8.8   MG 2.30  --   --   --   --    ALBUMIN 3.4*   < > 3.2* 3.3* 3.3*   ALKPHOS 90   < > 87 88 84   ALT 8   < > 13 15 15   AST 12   < > 14 15 14   BILITOT 0.3   < > 0.2 0.3 0.3    < > = values in this interval not displayed.     Microbiology Results (last 7 days)       Procedure Component Value Units Date/Time    Blood Culture [[id number]]  (Normal) Collected: 05/06/24 9849    Order Status: Completed Specimen: Blood Updated: 05/11/24 2000     Blood Culture No Growth at 5 days    Blood Culture [[id number]]  (Normal) Collected: 05/06/24 1849    Order Status: Completed Specimen: Blood Updated: 05/11/24 2000     Blood Culture No Growth at 5 days             See below for Radiology    Scheduled Med:   allopurinoL  300 mg Oral Daily    dexAMETHasone  4 mg Oral Q8H    enoxparin  1 mg/kg (Dosing Weight) Subcutaneous Q12H (treatment, non-standard time)    folic acid  1 mg Oral Daily    pantoprazole  40 mg Oral Before breakfast    sodium chloride 0.9% 250 mL flush bag    Intravenous 1 time in Clinic/HOD    sodium chloride 0.9%  10 mL Intravenous Q6H    thiamine  100 mg Oral TID      Continuous Infusions:     PRN Meds:    Current Facility-Administered Medications:     acetaminophen, 650 mg, Oral, Q6H PRN    alteplase, 2 mg, Intra-Catheter, PRN    benzonatate, 200 mg, Oral, TID PRN    diphenhydrAMINE, 50 mg, Intravenous, Once PRN    EPINEPHrine, 0.3 mg, Intramuscular, Once PRN    heparin, porcine (PF), 500 Units, Intravenous, PRN    hydrocortisone sodium succinate, 100 mg, Intravenous, Once PRN    LIDOcaine HCL 4%, 4 mL, Topical (Top), PRN    LORazepam, 2 mg, Oral, Q2H PRN    oxyCODONE-acetaminophen, 1 tablet, Oral, Q6H PRN    oxyCODONE-acetaminophen, 1 tablet, Oral, Q6H PRN    polyethylene glycol, 17 g, Oral, BID PRN    prochlorperazine, 10 mg, Intravenous, Q6H PRN    senna, 8.6 mg, Oral, Daily PRN    sodium chloride 0.9%, 10 mL, Intravenous, PRN    Flushing PICC/Midline Protocol, , , Until Discontinued **AND** sodium chloride 0.9%, 10 mL, Intravenous, Q6H **AND** sodium chloride 0.9%, 10 mL, Intravenous, PRN    sodium chloride 0.9%, 10 mL, Intravenous, PRN     Assessment/Plan:      VTE prophylaxis:     Patient condition:  Stable/Fair/Guarded/ Serious/ Critical    Anticipated discharge and Disposition:         All diagnosis and differential diagnosis have been reviewed; assessment and plan has been documented; I have personally reviewed the labs and test results that are presently available; I have reviewed the patients medication list; I have reviewed the consulting providers response and recommendations. I have reviewed or attempted to review medical records based upon their availability    All of the patient's questions have been  addressed and answered. Patient's is agreeable to the above stated plan. I will continue to monitor closely and make adjustments to medical management as needed.  _____________________________________________________________________    Nutrition  Status:    Radiology:  I have personally reviewed the following imaging and agree with the radiologist.     X-Ray Chest 1 View for Line/Tube Placement  Narrative: EXAMINATION:  Single view chest radiograph.    CLINICAL HISTORY:  No blood return;    TECHNIQUE:  Single view of the chest.    COMPARISON:  Chest radiograph 05/10/2024.    FINDINGS:  The left upper extremity PICC is unchanged with its tip in the SVC.  The right perihilar and right upper lobe airspace opacities are unchanged.  The left lung is clear.  There is no pneumothorax.  The cardiac silhouette is normal in size.  Impression: No significant change from prior exam.    Electronically signed by: Mark Lofton MD  Date:    05/12/2024  Time:    10:04      Carmen Martinez MD  Department of Hospital Medicine   [facility]   05/14/2024

## 2024-05-14 NOTE — PROGRESS NOTES
[facility] - Oncology Acute  Hematology/Oncology  Consult Note    Patient Name: Adán Dinero  MRN: [id number]  Admission Date: 5/6/2024  Hospital Length of Stay: 8 days  Attending Provider: Faraz Summers MD  Consulting Provider: Miguel Rey II, MD  Principal Problem:Small cell carcinoma    Consults  Subjective:     HPI:  Patient arrives by [facility], transferred from [facility] for swelling to L side of neck and change in speech. Pt sent for pulmonology/oncology services for Dx. superior vena cava syndrome per EMS. reports swelling to L neck, muffled speech, headache, throat pressure.      He was seen in the ED and a CT of the head which was normal, CT soft tissue of the neck which showed generalized edema throughout the soft tissue he would right jugular vein engorgement in the CT showed a large mass in the right lung apex right hilum mediastinum 11 cm mass causes obstruction of the SVC with thrombus or tumor thrombus extending into the SVC at the level of the brachiocephalic vein and the mass encases the right pulmonary artery right hilum partially obstructing right upper lobe, there is a postobstructive pneumonia in the right upper lobe and involvement of the brachial plexus adjacent subclavian artery with mass in the right lung apex.  And a right-sided pleural effusion    CT of the abdomen pelvis on 05/06/2024 showed a questionable liver metastases as well.    MRI brain --motion artifact    Patient underwent bronchoscopy on 05/08/2024, there appeared to be endobronchial involvement of mediastinal mass.  Multiple biopsies were taken.  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    Today 05/14/2024:   Patient seen and examined on rounds.  He states that he still has some fullness in his neck, but otherwise is tolerating treatment and feels better than he did a few days ago.  He continues to improve on  a daily basis.        Medications:  Continuous Infusions:      Scheduled Meds:   allopurinoL  300 mg Oral Daily    dexAMETHasone  4 mg Oral Q8H    enoxparin  1 mg/kg (Dosing Weight) Subcutaneous Q12H (treatment, non-standard time)    folic acid  1 mg Oral Daily    pantoprazole  40 mg Oral Before breakfast    sodium chloride 0.9% 250 mL flush bag   Intravenous 1 time in Clinic/HOD    sodium chloride 0.9%  10 mL Intravenous Q6H    thiamine  100 mg Oral TID     PRN Meds:  Current Facility-Administered Medications:     acetaminophen, 650 mg, Oral, Q6H PRN    alteplase, 2 mg, Intra-Catheter, PRN    benzonatate, 200 mg, Oral, TID PRN    diphenhydrAMINE, 50 mg, Intravenous, Once PRN    EPINEPHrine, 0.3 mg, Intramuscular, Once PRN    heparin, porcine (PF), 500 Units, Intravenous, PRN    hydrocortisone sodium succinate, 100 mg, Intravenous, Once PRN    LIDOcaine HCL 4%, 4 mL, Topical (Top), PRN    LORazepam, 2 mg, Oral, Q2H PRN    oxyCODONE-acetaminophen, 1 tablet, Oral, Q6H PRN    oxyCODONE-acetaminophen, 1 tablet, Oral, Q6H PRN    polyethylene glycol, 17 g, Oral, BID PRN    prochlorperazine, 10 mg, Intravenous, Q6H PRN    senna, 8.6 mg, Oral, Daily PRN    sodium chloride 0.9%, 10 mL, Intravenous, PRN    Flushing PICC/Midline Protocol, , , Until Discontinued **AND** sodium chloride 0.9%, 10 mL, Intravenous, Q6H **AND** sodium chloride 0.9%, 10 mL, Intravenous, PRN    sodium chloride 0.9%, 10 mL, Intravenous, PRN     Review of patient's allergies indicates:  No Known Allergies     Past Medical History:   Diagnosis Date    Lung mass      Past Surgical History:   Procedure Laterality Date    CATARACT EXTRACTION W/  INTRAOCULAR LENS IMPLANT Left 06/28/2023    Procedure: EXTRACTION, CATARACT, WITH IOL INSERTION;  Surgeon: Fritz Cornejo MD;  Location: [facility];  Service: Ophthalmology;  Laterality: Left;    ENDOBRONCHIAL ULTRASOUND N/A 5/8/2024    Procedure: ENDOBRONCHIAL ULTRASOUND (EBUS);  Surgeon: NICOLETTE Smart MD;   Location: [facility] BR[facility]CHOSCOPY LAB;  Service: Pulmonary;  Laterality: N/A;  4R  /  7L    HERNIA REPAIR       Family History    None       Social history   smoker,   alcohol,   Tugboat worker,    Review of Systems   Constitutional:  Positive for fatigue. Negative for fever and unexpected weight change.   HENT:  Positive for congestion and facial swelling.    Respiratory:  Positive for shortness of breath.    Cardiovascular: Negative.    Gastrointestinal: Negative.    Endocrine: Negative.    Genitourinary: Negative.    Musculoskeletal: Negative.    Skin: Negative.    Neurological:  Positive for numbness. Negative for tremors, seizures, syncope and weakness.   Hematological: Negative.    Psychiatric/Behavioral: Negative.       Objective:     Vital Signs (Most Recent):  Temp: 98.5 °F (36.9 °C) (05/14/24 1140)  Pulse: 77 (05/14/24 1140)  Resp: 18 (05/14/24 1140)  BP: 117/75 (05/14/24 1140)  SpO2: 97 % (05/14/24 1140) Vital Signs (24h Range):  Temp:  [97.5 °F (36.4 °C)-98.5 °F (36.9 °C)] 98.5 °F (36.9 °C)  Pulse:  [66-91] 77  Resp:  [18-20] 18  SpO2:  [95 %-98 %] 97 %  BP: (113-125)/(71-87) 117/75     Weight: 61.7 kg (136 lb)  Body mass index is 21.95 kg/m².  Body surface area is 1.7 meters squared.      Intake/Output Summary (Last 24 hours) at 5/14/2024 1158  Last data filed at 5/14/2024 0643  Gross per 24 hour   Intake 2684.05 ml   Output 2250 ml   Net 434.05 ml       Physical Exam  Vitals reviewed.   Constitutional:       General: He is not in acute distress.  HENT:      Mouth/Throat:      Mouth: Mucous membranes are moist.      Pharynx: Oropharynx is clear.   Eyes:      Extraocular Movements: Extraocular movements intact.      Conjunctiva/sclera: Conjunctivae normal.      Pupils: Pupils are equal, round, and reactive to light.   Neck:      Comments: Left-sided neck vein swelling facial swelling  Cardiovascular:      Rate and Rhythm: Normal rate and regular rhythm.      Pulses: Normal pulses.      Heart sounds: Normal  heart sounds.   Pulmonary:      Effort: Pulmonary effort is normal. No accessory muscle usage.      Breath sounds: Decreased air movement present. Examination of the right-middle field reveals decreased breath sounds. Decreased breath sounds present.   Chest:      Comments: Dilated veins, erythema  Abdominal:      General: Abdomen is flat. Bowel sounds are normal. There is no distension.      Palpations: Abdomen is soft.      Comments: Old hernia repair scar   Musculoskeletal:         General: Normal range of motion.   Lymphadenopathy:      Cervical: Cervical adenopathy present.      Upper Body:      Right upper body: No axillary adenopathy.      Left upper body: No axillary adenopathy.      Comments: Questionable some mild shotty inguinal adenopathy   Skin:     General: Skin is warm and dry.      Comments: Multiple tattoos   Neurological:      General: No focal deficit present.      Mental Status: He is alert and oriented to person, place, and time. Mental status is at baseline.      GCS: GCS eye subscore is 4. GCS verbal subscore is 5. GCS motor subscore is 6.      Cranial Nerves: Cranial nerves 2-12 are intact. No cranial nerve deficit.      Sensory: Sensation is intact.      Motor: No weakness.      Coordination: Coordination is intact.      Comments: He does have some mild numbness in his left hand.       Psychiatric:         Attention and Perception: Attention normal.         Mood and Affect: Mood normal.         Speech: Speech normal.         Behavior: Behavior normal.         Thought Content: Thought content normal.         Significant Labs:     Lab Review:  CBC:   Recent Labs     05/13/24 0410 05/12/24 0456 05/11/24  0406   WBC 13.39* 13.81* 12.64  12.64*   RBC 4.39* 4.22* 4.02*   HGB 14.2 13.6* 12.7*   HCT 41.9* 40.5* 39.1*    261 259     CMP:   Recent Labs     05/13/24 0410 05/12/24  0456 05/11/24  0406   * 132* 134*   K 4.9 4.5 4.5   CO2 27 27 30*   BUN 14.6 12.5 14.1   CREATININE 0.63*  0.65* 0.74   CALCIUM 8.8 8.9 9.0   LABPROT 6.2* 6.7 6.4   ALBUMIN 3.3* 3.3* 3.2*   BILITOT 0.3 0.3 0.2   ALKPHOS 84 88 87   AST 14 15 14   ALT 15 15 13        Diagnostic Results:  I have reviewed all pertinent imaging results/findings within the past 24 hours.          Assessment/Plan:   Small cell lung carcinoma  SVC syndrome,  Tumor thrombus  Pleural effusion  Postobstructive pneumonia  Tobacco use  Alcohol use  Abnormal CT of the liver    PLAN  Dr. Cohen spoke with the patient on 05/10/2024.  He agreed to chemotherapy.  Carboplatin and etoposide plan was entered.    Consent signed on the morning of 05/11/2024 by me and the patient.  PICC line has been placed.    Started carboplatin and etoposide on 05/11/2024, cycle 1 day 1.    Today cycle 1 day 4, will get etoposide today.    We will continue to monitor closely.  No need for radiation at this time.      If he continues to improve tomorrow, we will likely be able to discharge him.    We will need follow up in [place] on outpatient basis.        Chemo side effects  The side effects of chemotherapy were discussed.  Including but not limited to: Nausea, vomiting, alopecia, neuropathy, neutropenia, blood transfusion, nephropathy, secondary malignancies, congestive heart failure, allergic reactions to name a few.  patient with the opportunity to have questions answered.      Miguel Rey II, MD  Hematology/Oncology  [facility] - Oncology [facility]

## 2024-05-15 ENCOUNTER — TELEPHONE (OUTPATIENT)
Dept: HEMATOLOGY/ONCOLOGY | Facility: CLINIC | Age: 54
End: 2024-05-15
Payer: MEDICAID

## 2024-05-15 VITALS
DIASTOLIC BLOOD PRESSURE: 77 MMHG | BODY MASS INDEX: 21.86 KG/M2 | RESPIRATION RATE: 18 BRPM | OXYGEN SATURATION: 98 % | SYSTOLIC BLOOD PRESSURE: 125 MMHG | WEIGHT: 136 LBS | HEART RATE: 83 BPM | TEMPERATURE: 98 F | HEIGHT: 66 IN

## 2024-05-15 DIAGNOSIS — R91.8 LUNG MASS: Primary | ICD-10-CM

## 2024-05-15 DIAGNOSIS — C80.1 SMALL CELL CARCINOMA: Primary | ICD-10-CM

## 2024-05-15 LAB
ALBUMIN SERPL-MCNC: 3.4 G/DL (ref 3.5–5)
ALBUMIN/GLOB SERPL: 1.3 RATIO (ref 1.1–2)
ALP SERPL-CCNC: 72 UNIT/L (ref 40–150)
ALT SERPL-CCNC: 25 UNIT/L (ref 0–55)
AST SERPL-CCNC: 18 UNIT/L (ref 5–34)
BASOPHILS # BLD AUTO: 0 X10(3)/MCL
BASOPHILS NFR BLD AUTO: 0 %
BILIRUB SERPL-MCNC: 0.7 MG/DL
BUN SERPL-MCNC: 15.2 MG/DL (ref 8.4–25.7)
CALCIUM SERPL-MCNC: 8.9 MG/DL (ref 8.4–10.2)
CHLORIDE SERPL-SCNC: 97 MMOL/L (ref 98–107)
CO2 SERPL-SCNC: 30 MMOL/L (ref 22–29)
CREAT SERPL-MCNC: 0.64 MG/DL (ref 0.73–1.18)
EOSINOPHIL # BLD AUTO: 0.01 X10(3)/MCL (ref 0–0.9)
EOSINOPHIL NFR BLD AUTO: 0.2 %
ERYTHROCYTE [DISTWIDTH] IN BLOOD BY AUTOMATED COUNT: 13.1 % (ref 11.5–17)
GFR SERPLBLD CREATININE-BSD FMLA CKD-EPI: >60 ML/MIN/1.73/M2
GLOBULIN SER-MCNC: 2.7 GM/DL (ref 2.4–3.5)
GLUCOSE SERPL-MCNC: 95 MG/DL (ref 74–100)
HCT VFR BLD AUTO: 37.9 % (ref 42–52)
HGB BLD-MCNC: 12.4 G/DL (ref 14–18)
IMM GRANULOCYTES # BLD AUTO: 0.05 X10(3)/MCL (ref 0–0.04)
IMM GRANULOCYTES NFR BLD AUTO: 1 %
LYMPHOCYTES # BLD AUTO: 0.85 X10(3)/MCL (ref 0.6–4.6)
LYMPHOCYTES NFR BLD AUTO: 17.1 %
MCH RBC QN AUTO: 31.4 PG (ref 27–31)
MCHC RBC AUTO-ENTMCNC: 32.7 G/DL (ref 33–36)
MCV RBC AUTO: 95.9 FL (ref 80–94)
MONOCYTES # BLD AUTO: 0.07 X10(3)/MCL (ref 0.1–1.3)
MONOCYTES NFR BLD AUTO: 1.4 %
NEUTROPHILS # BLD AUTO: 4 X10(3)/MCL (ref 2.1–9.2)
NEUTROPHILS NFR BLD AUTO: 80.3 %
NRBC BLD AUTO-RTO: 0 %
PLATELET # BLD AUTO: 235 X10(3)/MCL (ref 130–400)
PMV BLD AUTO: 10.2 FL (ref 7.4–10.4)
POTASSIUM SERPL-SCNC: 4.7 MMOL/L (ref 3.5–5.1)
PROT SERPL-MCNC: 6.1 GM/DL (ref 6.4–8.3)
RBC # BLD AUTO: 3.95 X10(6)/MCL (ref 4.7–6.1)
SODIUM SERPL-SCNC: 132 MMOL/L (ref 136–145)
WBC # SPEC AUTO: 4.98 X10(3)/MCL (ref 4.5–11.5)

## 2024-05-15 PROCEDURE — 99233 SBSQ HOSP IP/OBS HIGH 50: CPT | Mod: ,,, | Performed by: INTERNAL MEDICINE

## 2024-05-15 PROCEDURE — 80053 COMPREHEN METABOLIC PANEL: CPT | Performed by: INTERNAL MEDICINE

## 2024-05-15 PROCEDURE — 25000003 PHARM REV CODE 250: Performed by: STUDENT IN AN ORGANIZED HEALTH CARE EDUCATION/TRAINING PROGRAM

## 2024-05-15 PROCEDURE — 63600175 PHARM REV CODE 636 W HCPCS: Performed by: INTERNAL MEDICINE

## 2024-05-15 PROCEDURE — A4216 STERILE WATER/SALINE, 10 ML: HCPCS | Performed by: STUDENT IN AN ORGANIZED HEALTH CARE EDUCATION/TRAINING PROGRAM

## 2024-05-15 PROCEDURE — 85025 COMPLETE CBC W/AUTO DIFF WBC: CPT | Performed by: INTERNAL MEDICINE

## 2024-05-15 PROCEDURE — 25000003 PHARM REV CODE 250: Performed by: INTERNAL MEDICINE

## 2024-05-15 PROCEDURE — 27000221 HC OXYGEN, UP TO 24 HOURS

## 2024-05-15 PROCEDURE — 25000003 PHARM REV CODE 250

## 2024-05-15 PROCEDURE — 36415 COLL VENOUS BLD VENIPUNCTURE: CPT | Performed by: INTERNAL MEDICINE

## 2024-05-15 RX ORDER — OXYCODONE AND ACETAMINOPHEN 10; 325 MG/1; MG/1
1 TABLET ORAL EVERY 6 HOURS PRN
Qty: 28 TABLET | Refills: 0 | Status: SHIPPED | OUTPATIENT
Start: 2024-05-15 | End: 2024-05-30 | Stop reason: SDUPTHER

## 2024-05-15 RX ORDER — ALLOPURINOL 300 MG/1
300 TABLET ORAL DAILY
Qty: 30 TABLET | Refills: 2 | Status: SHIPPED | OUTPATIENT
Start: 2024-05-16 | End: 2024-08-14

## 2024-05-15 RX ORDER — BENZONATATE 200 MG/1
200 CAPSULE ORAL 3 TIMES DAILY
Qty: 30 CAPSULE | Refills: 0 | Status: SHIPPED | OUTPATIENT
Start: 2024-05-15 | End: 2024-05-23

## 2024-05-15 RX ORDER — PANTOPRAZOLE SODIUM 40 MG/1
40 TABLET, DELAYED RELEASE ORAL
Qty: 30 TABLET | Refills: 2 | Status: SHIPPED | OUTPATIENT
Start: 2024-05-16 | End: 2024-08-14

## 2024-05-15 RX ORDER — DEXAMETHASONE 4 MG/1
TABLET ORAL
Qty: 15 TABLET | Refills: 0 | Status: SHIPPED | OUTPATIENT
Start: 2024-05-15 | End: 2024-05-23 | Stop reason: SDUPTHER

## 2024-05-15 RX ORDER — LANOLIN ALCOHOL/MO/W.PET/CERES
100 CREAM (GRAM) TOPICAL 3 TIMES DAILY
Qty: 90 TABLET | Refills: 0 | Status: SHIPPED | OUTPATIENT
Start: 2024-05-15 | End: 2024-06-14

## 2024-05-15 RX ORDER — FOLIC ACID 1 MG/1
1 TABLET ORAL DAILY
Qty: 30 TABLET | Refills: 2 | Status: SHIPPED | OUTPATIENT
Start: 2024-05-16 | End: 2024-08-14

## 2024-05-15 RX ORDER — NALOXEGOL OXALATE 25 MG/1
25 TABLET, FILM COATED ORAL DAILY
Qty: 30 TABLET | Refills: 0 | Status: SHIPPED | OUTPATIENT
Start: 2024-05-15 | End: 2024-06-14

## 2024-05-15 RX ADMIN — THIAMINE HCL TAB 100 MG 100 MG: 100 TAB at 09:05

## 2024-05-15 RX ADMIN — SODIUM CHLORIDE, PRESERVATIVE FREE 10 ML: 5 INJECTION INTRAVENOUS at 05:05

## 2024-05-15 RX ADMIN — ALLOPURINOL 300 MG: 300 TABLET ORAL at 09:05

## 2024-05-15 RX ADMIN — PANTOPRAZOLE SODIUM 40 MG: 40 TABLET, DELAYED RELEASE ORAL at 05:05

## 2024-05-15 RX ADMIN — DEXAMETHASONE 4 MG: 4 TABLET ORAL at 05:05

## 2024-05-15 RX ADMIN — ENOXAPARIN SODIUM 70 MG: 80 INJECTION SUBCUTANEOUS at 09:05

## 2024-05-15 RX ADMIN — FOLIC ACID 1 MG: 1 TABLET ORAL at 09:05

## 2024-05-15 RX ADMIN — SODIUM CHLORIDE, PRESERVATIVE FREE 10 ML: 5 INJECTION INTRAVENOUS at 12:05

## 2024-05-15 NOTE — PLAN OF CARE
Spoke with Melony at [facility] [phone]. Referral has been received and the cancer center will call patient to schedule an appointment.

## 2024-05-15 NOTE — PLAN OF CARE
Problem: Adult Inpatient Plan of Care  Goal: Readiness for Transition of Care  Outcome: Progressing     Problem: Infection  Goal: Absence of Infection Signs and Symptoms  Outcome: Progressing  Intervention: Prevent or Manage Infection  Flowsheets (Taken 5/14/2024 1914)  Infection Management: aseptic technique maintained  Isolation Precautions: precautions maintained     Problem: Pain Acute  Goal: Optimal Pain Control and Function  Outcome: Progressing  Intervention: Prevent or Manage Pain  Flowsheets (Taken 5/14/2024 1914)  Sleep/Rest Enhancement:   awakenings minimized   room darkened   regular sleep/rest pattern promoted  Medication Review/Management: medications reviewed     Problem: Fall Injury Risk  Goal: Absence of Fall and Fall-Related Injury  Outcome: Progressing  Intervention: Identify and Manage Contributors  Flowsheets (Taken 5/14/2024 1914)  Medication Review/Management: medications reviewed  Intervention: Promote Injury-Free Environment  Flowsheets (Taken 5/14/2024 1914)  Safety Promotion/Fall Prevention:   assistive device/personal item within reach   Fall Risk signage in place   nonskid shoes/socks when out of bed   medications reviewed

## 2024-05-15 NOTE — PROGRESS NOTES
[facility] - Oncology Acute  Hematology/Oncology  Consult Note    Patient Name: Adán iDnero  MRN: [id number]  Admission Date: 5/6/2024  Hospital Length of Stay: 9 days  Attending Provider: Faraz Summers MD  Consulting Provider: Miguel Rey II, MD  Principal Problem:Small cell carcinoma    Consults  Subjective:     HPI:  Patient arrives by [facility], transferred from [facility] for swelling to L side of neck and change in speech. Pt sent for pulmonology/oncology services for Dx. superior vena cava syndrome per EMS. reports swelling to L neck, muffled speech, headache, throat pressure.      He was seen in the ED and a CT of the head which was normal, CT soft tissue of the neck which showed generalized edema throughout the soft tissue he would right jugular vein engorgement in the CT showed a large mass in the right lung apex right hilum mediastinum 11 cm mass causes obstruction of the SVC with thrombus or tumor thrombus extending into the SVC at the level of the brachiocephalic vein and the mass encases the right pulmonary artery right hilum partially obstructing right upper lobe, there is a postobstructive pneumonia in the right upper lobe and involvement of the brachial plexus adjacent subclavian artery with mass in the right lung apex.  And a right-sided pleural effusion    CT of the abdomen pelvis on 05/06/2024 showed a questionable liver metastases as well.    MRI brain --motion artifact    Patient underwent bronchoscopy on 05/08/2024, there appeared to be endobronchial involvement of mediastinal mass.  Multiple biopsies were taken.  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    Today 05/15/2024:   Patient seen and examined on rounds.  Overall he was doing well.  He feels much better this morning.  He feels like he was almost back to normal.        Medications:  Continuous Infusions:      Scheduled  Meds:   allopurinoL  300 mg Oral Daily    dexAMETHasone  4 mg Oral Q8H    enoxparin  1 mg/kg (Dosing Weight) Subcutaneous Q12H (treatment, non-standard time)    folic acid  1 mg Oral Daily    pantoprazole  40 mg Oral Before breakfast    sodium chloride 0.9% 250 mL flush bag   Intravenous 1 time in Clinic/HOD    sodium chloride 0.9%  10 mL Intravenous Q6H    thiamine  100 mg Oral TID     PRN Meds:  Current Facility-Administered Medications:     acetaminophen, 650 mg, Oral, Q6H PRN    alteplase, 2 mg, Intra-Catheter, PRN    benzonatate, 200 mg, Oral, TID PRN    diphenhydrAMINE, 50 mg, Intravenous, Once PRN    EPINEPHrine, 0.3 mg, Intramuscular, Once PRN    heparin, porcine (PF), 500 Units, Intravenous, PRN    hydrocortisone sodium succinate, 100 mg, Intravenous, Once PRN    LIDOcaine HCL 4%, 4 mL, Topical (Top), PRN    LORazepam, 2 mg, Oral, Q2H PRN    oxyCODONE-acetaminophen, 1 tablet, Oral, Q6H PRN    oxyCODONE-acetaminophen, 1 tablet, Oral, Q6H PRN    polyethylene glycol, 17 g, Oral, BID PRN    prochlorperazine, 10 mg, Intravenous, Q6H PRN    senna, 8.6 mg, Oral, Daily PRN    sodium chloride 0.9%, 10 mL, Intravenous, PRN    Flushing PICC/Midline Protocol, , , Until Discontinued **AND** sodium chloride 0.9%, 10 mL, Intravenous, Q6H **AND** sodium chloride 0.9%, 10 mL, Intravenous, PRN    sodium chloride 0.9%, 10 mL, Intravenous, PRN     Review of patient's allergies indicates:  No Known Allergies     Past Medical History:   Diagnosis Date    Lung mass      Past Surgical History:   Procedure Laterality Date    CATARACT EXTRACTION W/  INTRAOCULAR LENS IMPLANT Left 06/28/2023    Procedure: EXTRACTION, CATARACT, WITH IOL INSERTION;  Surgeon: Fritz Cornejo MD;  Location: [facility];  Service: Ophthalmology;  Laterality: Left;    ENDOBRONCHIAL ULTRASOUND N/A 5/8/2024    Procedure: ENDOBRONCHIAL ULTRASOUND (EBUS);  Surgeon: NICOLETTE Smart MD;  Location: [facility] BRONCHOSCOPY LAB;  Service: Pulmonary;  Laterality: N/A;   4R  /  7L    HERNIA REPAIR       Family History    None       Social history   smoker,   alcohol,   Tugboat worker,    Review of Systems   Constitutional:  Negative for fatigue, fever and unexpected weight change.   HENT:  Negative for congestion and facial swelling.    Respiratory:  Negative for shortness of breath.    Cardiovascular: Negative.    Gastrointestinal: Negative.    Endocrine: Negative.    Genitourinary: Negative.    Musculoskeletal: Negative.    Skin: Negative.    Neurological:  Negative for tremors, seizures, syncope, weakness and numbness.   Hematological: Negative.    Psychiatric/Behavioral: Negative.       Objective:     Vital Signs (Most Recent):  Temp: 97.9 °F (36.6 °C) (05/15/24 0748)  Pulse: 73 (05/15/24 0748)  Resp: 18 (05/15/24 0545)  BP: 126/83 (05/15/24 0748)  SpO2: 98 % (05/15/24 0748) Vital Signs (24h Range):  Temp:  [97.6 °F (36.4 °C)-98.5 °F (36.9 °C)] 97.9 °F (36.6 °C)  Pulse:  [71-87] 73  Resp:  [18] 18  SpO2:  [95 %-98 %] 98 %  BP: (113-126)/(73-83) 126/83     Weight: 61.7 kg (136 lb)  Body mass index is 21.95 kg/m².  Body surface area is 1.7 meters squared.      Intake/Output Summary (Last 24 hours) at 5/15/2024 1014  Last data filed at 5/15/2024 0500  Gross per 24 hour   Intake --   Output 2600 ml   Net -2600 ml       Physical Exam  Vitals reviewed.   Constitutional:       General: He is awake.      Appearance: Normal appearance.   HENT:      Head: Normocephalic and atraumatic.      Right Ear: Hearing normal.      Left Ear: Hearing normal.      Nose: Nose normal.   Eyes:      General: Lids are normal. Vision grossly intact.      Extraocular Movements: Extraocular movements intact.      Conjunctiva/sclera: Conjunctivae normal.   Cardiovascular:      Rate and Rhythm: Normal rate and regular rhythm.      Pulses: Normal pulses.      Heart sounds: Normal heart sounds.   Pulmonary:      Effort: Pulmonary effort is normal.      Breath sounds: Normal breath sounds. No wheezing, rhonchi or  rales.   Abdominal:      General: Bowel sounds are normal.      Palpations: Abdomen is soft.      Tenderness: There is no abdominal tenderness.   Musculoskeletal:      Cervical back: Full passive range of motion without pain.      Right lower leg: No edema.      Left lower leg: No edema.   Lymphadenopathy:      Cervical: No cervical adenopathy.      Upper Body:      Right upper body: No supraclavicular or axillary adenopathy.      Left upper body: No supraclavicular or axillary adenopathy.   Skin:     General: Skin is warm.   Neurological:      General: No focal deficit present.      Mental Status: He is alert and oriented to person, place, and time.   Psychiatric:         Attention and Perception: Attention normal.         Mood and Affect: Mood and affect normal.         Behavior: Behavior is cooperative.         Significant Labs:     Lab Review:  CBC:   Recent Labs     05/15/24  0735 05/13/24  0410 05/12/24  0456   WBC 4.98 13.39* 13.81*   RBC 3.95* 4.39* 4.22*   HGB 12.4* 14.2 13.6*   HCT 37.9* 41.9* 40.5*    206 261     CMP:   Recent Labs     05/15/24  0735 05/13/24  0410 05/12/24  0456   * 134* 132*   K 4.7 4.9 4.5   CO2 30* 27 27   BUN 15.2 14.6 12.5   CREATININE 0.64* 0.63* 0.65*   CALCIUM 8.9 8.8 8.9   LABPROT 6.1* 6.2* 6.7   ALBUMIN 3.4* 3.3* 3.3*   BILITOT 0.7 0.3 0.3   ALKPHOS 72 84 88   AST 18 14 15   ALT 25 15 15        Diagnostic Results:  I have reviewed all pertinent imaging results/findings within the past 24 hours.          Assessment/Plan:   Small cell lung carcinoma  SVC syndrome,  Tumor thrombus  Pleural effusion  Postobstructive pneumonia  Tobacco use  Alcohol use  Abnormal CT of the liver    PLAN  Dr. Cohen spoke with the patient on 05/10/2024.  He agreed to chemotherapy.  Carboplatin and etoposide plan was entered.    Consent signed on the morning of 05/11/2024 by me and the patient.  PICC line has been placed.    Started carboplatin and etoposide on 05/11/2024, cycle 1 day  1.    Today cycle 1 day 5.    We will continue to monitor closely.  No need for radiation at this time.      Patient stable for discharge from Oncology standpoint.    We will need follow up in [place] on outpatient basis. I spoke with Dr. Arriola.         Chemo side effects  The side effects of chemotherapy were discussed.  Including but not limited to: Nausea, vomiting, alopecia, neuropathy, neutropenia, blood transfusion, nephropathy, secondary malignancies, congestive heart failure, allergic reactions to name a few.  patient with the opportunity to have questions answered.      Miguel Rey II, MD  Hematology/Oncology  [facility] - Oncology [facility]

## 2024-05-15 NOTE — PLAN OF CARE
05/15/24 1413   Final Note   Assessment Type Final Discharge Note   Anticipated Discharge Disposition Home-Health   Hospital Resources/Appts/Education Provided Post-Acute resouces added to AVS   Post-Acute Status   Post-Acute Authorization Home Health;HME   HME Status Set-up Complete/Auth obtained   Home Health Status Set-up Complete/Auth obtained   Discharge Delays None known at this time     Patient will dc home today family will provide transportation. Home oxygen has been delivered to patient's room by [employer]. [facility] will be providing home health services. Patient has a rolling walker at home. PCP appointment is scheduled for 5/23 with Dr. Nogueira.

## 2024-05-15 NOTE — PROGRESS NOTES
Referring provider:  Dr. Campbell    Reason for consultation:  Small-cell lung cancer      Diagnosis:  Small-cell lung cancer, extensive stage.      Current treatment:    05/11/2024-current:  Carbo etoposide atezolizumab, atezolizumab was omitted on 1st cycle due to being inpatient.      Treatment history:      HPI:    Patient was a 54-year-old male with history of alcohol use presented to the hospital at [place] with symptoms of left neck swelling and change in speech.  In the ER he would soft tissue neck CT done which revealed generalized edema throughout the soft tissue as well as right jugular vein engorgement and a large lung mass in the right lung apex concerning for malignancy.  Patient was started on Lovenox 1 milligram/kg b.i.d. 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  A CT of the abdomen and pelvis on 05/06/2024 revealed questionable metastases to the liver.  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  Patient received cycle 1 of carbo etoposide on 05/11/2024 while inpatient at [facility].  He was referred to our clinic for outpatient care and further treatment management.  He presented to clinic on 05/24/2024 to establish care.    Patient reports history of smoking, half pack per day, 6 pack of beer daily, prior history of recreational drug use.  Last use 5 years back.  He lives with a friend.  He works offshore on oil field.  Denies family history of malignancy.        Interval history:    Today, 05/24/2024, patient reports symptoms of right posterior chest wall pain as well as dyspnea on exertion.  He is currently using intermittent supplemental oxygen via nasal cannula at home.  He reports tolerating his 1st cycle of treatment well and reports improvement in his symptoms of neck swelling as well as arm  swelling.          Pathology       05/10/2024 for our pulmonary lymph node biopsy small-cell carcinoma, grade 3 neuroendocrine carcinoma with extensive necrosis.  Lymph node pulmonary 7 L EBUS biopsy small-cell carcinoma, with extensive necrosis.      Imaging     05/07/2024 MRI brain with and without contrast:  No obvious metastatic disease seen however large areas of cerebral convexity specifically on the right side and to a lesser extent on the left side are not well evaluated on this examination due to artifact.    05/06/2024 CT abdomen pelvis with IV contrast:  Nonspecific hypodense lesion is identified in the liver may represent metastatic disease.  Other secondary findings as noted above.  The liver mass is 9 mm in size.        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       Past Surgical History:   Procedure Laterality Date    CATARACT EXTRACTION W/  INTRAOCULAR LENS IMPLANT Left 06/28/2023    Dr Fritz Cornejo    ENDOBRONCHIAL ULTRASOUND N/A 05/08/2024    Dr Kayden Smart    HERNIA REPAIR  1993       Family History   Problem Relation Name Age of Onset    No Known Problems Mother      No Known Problems Father         Social History     Socioeconomic History    Marital status: Single   Tobacco Use    Smoking status: Every Day     Current packs/day: 0.25     Types: Cigarettes    Smokeless tobacco: Former   Substance and Sexual Activity    Alcohol use: Yes     Comment: 1/2 pint of whiskey daily       Current Outpatient Medications   Medication Sig Dispense Refill    albuterol (PROVENTIL/VENTOLIN HFA) 90  mcg/actuation inhaler Inhale 2 puffs into the lungs every 6 (six) hours as needed for Shortness of Breath.      allopurinoL (ZYLOPRIM) 300 MG tablet Take 1 tablet (300 mg total) by mouth once daily. 30 tablet 2    apixaban (ELIQUIS) 5 mg Tab Take 1 tablet (5 mg total) by mouth 2 (two) times daily. 60 tablet 5    dexAMETHasone (DECADRON) 4 MG Tab Take 1 tablet (4 mg total) by mouth every 12 (twelve) hours for 5 days, THEN 1 tablet (4 mg total) once daily for 5 days. 15 tablet 0    folic acid (FOLVITE) 1 MG tablet Take 1 tablet (1 mg total) by mouth once daily. 30 tablet 2    naloxegoL (MOVANTIK) 25 mg tablet Take 25 mg by mouth once daily. 30 tablet 0    oxyCODONE-acetaminophen (PERCOCET)  mg per tablet Take 1 tablet by mouth every 6 (six) hours as needed for Pain (g89.1). 28 tablet 0    pantoprazole (PROTONIX) 40 MG tablet Take 1 tablet (40 mg total) by mouth before breakfast. 30 tablet 2    thiamine 100 MG tablet Take 1 tablet (100 mg total) by mouth 3 (three) times daily. 90 tablet 0    acetaminophen (TYLENOL) 650 MG TbSR Take 650 mg by mouth as needed. (Patient not taking: Reported on 5/24/2024)       No current facility-administered medications for this visit.       Review of patient's allergies indicates:  No Known Allergies      Review of systems  CONSTITUTIONAL: no fevers, no chills, no weight loss, no fatigue, no weakness  HEMATOLOGIC: no abnormal bleeding, no abnormal bruising, no drenching night sweats  ONCOLOGIC: no new masses or lumps  HEENT: no vision loss, no tinnitus or hearing loss, no nose bleeding, no dysphagia, no odynophagia  CVS: no chest pain, no palpitations, no dyspnea on exertion  RESP: no shortness of breath, no hemoptysis, no cough  GI: no nausea, no vomiting, no diarrhea, no constipation, no melena, no hematochezia, no hematemesis, no abdominal pain, no increase in abdominal girth  : no dysuria, no hematuria, no hesitancy, no scrotal swelling, no discharge  INTEGUMENT: no rashes,  no abnormal bruising, no nail pitting, no hyperpigmentation  NEURO: no falls, no memory loss, no paresthesias or dysesthesias, no urofecal incontinence or retention, no loss of strength on any extremity  MSK: no back pain, no new joint pain, no joint swelling  PSYCH: no suicidal or homicidal ideation, no depression, no insomnia, no anhedonia  ENDOCRINE: no heat or cold intolerance, no polyuria, no polydipsia      Physical Exam:  Vitals:    05/24/24 0935   BP: 120/79   Pulse: 93   Resp: 18   Temp: 98.3 °F (36.8 °C)       GA: AAOx3, NAD  HEENT: NCAT, moist oral mucous membranes, extensive engorgement of neck veins.  Hoarse voice.  CVS: s1s2 RRR, no M/R/G  RESP:  RUL reduced air entry. TTP over right chest wall.   ABD: soft, NT, ND, BS+, no hepatosplenomegaly  EXT: no deformities, no pedal edema  SKIN: no rashes, warm and dry  NEURO: normal mentation, strength 5/5 on all 4 extremities, no sensory deficits        Assessment    # Small-cell lung cancer, probable extensive stage possible liver metastases   Reviewed pathology report and imaging studies   Discussed with patient the diagnosis of small-cell lung cancer, the aggressive nature of disease, the questionable liver involvement and likely advanced incurable stage with palliative nature of his treatment  Patient was status post 1st cycle of carbo etoposide while inpatient, cycle 1 day 1 on 05/11/2024  He was referred for port placement however surgery deemed it appropriate for a PICC line given the concern for SVC syndrome and SVC thrombus.    Patient was agreeable to proceed with palliative systemic chemotherapy with carbo etoposide atezolizumab      Plan   Chemo education   Bone scan  Radiation oncology referral considerations of concurrent radiation therapy given questionable nature of liver met  Obtain CT chest with contrast [facility]  Status post PICC placement with surgery   [facility] for PICC care  Plan to proceed with cycle 2 day 1 on  06/03/2024, labs the day prior, orders placed for insurance authorization.      A total of  60 minutes were spent in review of records, interpretation of test, coordination of care, discussion and counseling with the patient.        Portions of the record may have been created with voice recognition software. Occasional wrong-word or sound-a-like substitutions may have occurred due to the inherent limitations of voice recognition software. Read the chart carefully and recognize, using context, where substitutions have occurred.

## 2024-05-15 NOTE — PLAN OF CARE
Patient did not admit with a pneumonia diagnosis and has not been treated with antibiotics for  pneumonia during this hospital stay. Patient is clear of pneumonia.

## 2024-05-15 NOTE — TELEPHONE ENCOUNTER
Julienne with [facility] would like to know if you would be willing to sign plan of care until patient establishes care with new PCP on 5/23/24. Please advise.

## 2024-05-15 NOTE — DISCHARGE SUMMARY
[facility]  Hospital Medicine Discharge Summary    Admit Date: 5/6/2024  Discharge Date and Time: 5/15/88379:17 PM  Admitting Physician: HEATH Team  Discharging Physician: Carmen Martinez MD.  Primary Care Physician: Matt, Provider  Consults: Hospital Medicine    Discharge Diagnoses:  Acute hypoxic resp failure - multifactorial  - right lung cancer, tumour thrombus   Acute SVC syndrome  B/L UE Swelling  2/2 Above  Acute dysphagia 2/2 SVC Syndrome  Neck and facial swelling-suspected secondary to SVC syndrome-POA  Lung mass right upper-newly diagnosed- POA  Liver lesion- suspected metastatic disease- POA  Normocytic Anemia  Postobstructive pneumonia status post completed antibiotics  Tumour thrombus on lovenox     Hospital Course:   Chief Complaint: Inpatient Follow-up     HPI:   Mr Dinero is a 54 y.o. male who  PMH includes lung mass, neck swelling with voice changes over the past several weeks. PT presented to [facility] ED in [place] for his symptoms and was transferred  to  ED [facility] on 5/6/2024  for higher level of care pulmonary Services.and pulmonary and vascular services.  He has a history of ETOH abuse also. Patient was incidentally found to have right lung mass with concern for SVC syndrome on a CT scan 3 weeks ago, he was scheduled to follow up on the results with his PCP a few days prior to arrival in the ED. He reported he began to have next swelling and change in his voice 2 days before arrival in the ED and progressively got worse prompting ED visit.. Patient also also been having some difficulty swallowing solids, but no problems with liquids, shortness of breath chronically but no acute change, has been having some dizziness.  Patient reported no chest pain, cough, congestion, nausea, vomiting, diarrhea, abdominal pain or any urinary symptoms.  Workup in the ED demonstrated SVC syndrome.  Patient was started on heparin infusion.  Oncology and Hematology Services were  consulted with recommendations of admit inpatient to the ICU unit secondary to  high-risk for decompensation in the setting SVC syndrome.  Patient was placed on steroid therapy. He was evaluated by pulmonary Services which he had EBUS done with biopsies.  Patient hemodynamics have been stable.  He has been cleared for transfer out of ICU to the regular floor.  Hospital medicine services have been consulted for assumption of care.      After downgrading to the floor patient's oxygen requirements improved, Heme-Onc team evaluated the patient, patient was started on carboplatin and etoposide  05/11/2024 cycle 1 day 1 ,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 .Radiation oncology evaluated patient no indication for radiotherapy at this time patient was currently stable    Patient has completed antibiotics , and is cleared of pneumonia   Requires home oxygen           Objective/physical exam:  General: alert male lying comfortably in bed, in no acute distress  HEENT:  Facial swelling  Respiratory:  Unlabored breathing on 2 L nasal cannula  Cardiovascular:  Normal rate  Gastrointestinal:  Soft nontender   Neuro:  Alert and responding appropriately    Pt was seen and examined on the day of discharge      Vitals:  VITAL SIGNS: 24 HRS MIN & MAX LAST   Temp  Min: 97.6 °F (36.4 °C)  Max: 98.2 °F (36.8 °C) 97.7 °F (36.5 °C)   BP  Min: 113/74  Max: 126/83 125/77   Pulse  Min: 71  Max: 87  83   Resp  Min: 18  Max: 18 18   SpO2  Min: 95 %  Max: 98 % 98 %         Procedures Performed: No admission procedures for hospital encounter.     Significant Diagnostic Studies: See Full reports for all details    Recent Labs   Lab 05/12/24  0456 05/13/24  0410 05/15/24  0735   WBC 13.81* 13.39* 4.98   RBC 4.22* 4.39* 3.95*   HGB 13.6* 14.2 12.4*   HCT 40.5* 41.9* 37.9*   MCV 96.0* 95.4* 95.9*   MCH 32.2* 32.3* 31.4*   MCHC 33.6 33.9 32.7*   RDW 12.8 13.0 13.1    206 235   MPV 10.2  11.9* 10.2       Recent Labs   Lab 05/09/24  0520 05/10/24  0355 05/12/24  0456 05/13/24  0410 05/15/24  0735   *   < > 132* 134* 132*   K 4.9   < > 4.5 4.9 4.7   CO2 30*   < > 27 27 30*   BUN 16.2   < > 12.5 14.6 15.2   CREATININE 0.76   < > 0.65* 0.63* 0.64*   CALCIUM 9.3   < > 8.9 8.8 8.9   MG 2.30  --   --   --   --    ALBUMIN 3.4*   < > 3.3* 3.3* 3.4*   ALKPHOS 90   < > 88 84 72   ALT 8   < > 15 15 25   AST 12   < > 15 14 18   BILITOT 0.3   < > 0.3 0.3 0.7    < > = values in this interval not displayed.        Microbiology Results (last 7 days)       Procedure Component Value Units Date/Time    Blood Culture [[id number]]  (Normal) Collected: 05/06/24 1849    Order Status: Completed Specimen: Blood Updated: 05/11/24 2000     Blood Culture No Growth at 5 days    Blood Culture [[id number]]  (Normal) Collected: 05/06/24 1849    Order Status: Completed Specimen: Blood Updated: 05/11/24 2000     Blood Culture No Growth at 5 days             X-Ray Chest 1 View for Line/Tube Placement  Narrative: EXAMINATION:  Single view chest radiograph.    CLINICAL HISTORY:  No blood return;    TECHNIQUE:  Single view of the chest.    COMPARISON:  Chest radiograph 05/10/2024.    FINDINGS:  The left upper extremity PICC is unchanged with its tip in the SVC.  The right perihilar and right upper lobe airspace opacities are unchanged.  The left lung is clear.  There is no pneumothorax.  The cardiac silhouette is normal in size.  Impression: No significant change from prior exam.    Electronically signed by: Mark Lofton MD  Date:    05/12/2024  Time:    10:04         Medication List        START taking these medications      allopurinoL 300 MG tablet  Commonly known as: ZYLOPRIM  Take 1 tablet (300 mg total) by mouth once daily.  Start taking on: May 16, 2024     apixaban 5 mg Tab  Commonly known as: ELIQUIS  Take 1 tablet (5 mg total) by mouth 2 (two) times daily.     benzonatate 200 MG capsule  Commonly known as: TESSALON  Take 1  capsule (200 mg total) by mouth 3 (three) times daily. for 10 days     dexAMETHasone 4 MG Tab  Commonly known as: DECADRON  Take 1 tablet (4 mg total) by mouth every 12 (twelve) hours for 5 days, THEN 1 tablet (4 mg total) once daily for 5 days.  Start taking on: May 15, 2024     folic acid 1 MG tablet  Commonly known as: FOLVITE  Take 1 tablet (1 mg total) by mouth once daily.  Start taking on: May 16, 2024     MOVANTIK 25 mg tablet  Generic drug: naloxegoL  Take 25 mg by mouth once daily.     oxyCODONE-acetaminophen  mg per tablet  Commonly known as: PERCOCET  Take 1 tablet by mouth every 6 (six) hours as needed for Pain (g89.1).     pantoprazole 40 MG tablet  Commonly known as: PROTONIX  Take 1 tablet (40 mg total) by mouth before breakfast.  Start taking on: May 16, 2024     thiamine 100 MG tablet  Take 1 tablet (100 mg total) by mouth 3 (three) times daily.            CONTINUE taking these medications      acetaminophen 650 MG Tbsr  Commonly known as: TYLENOL     ketorolac 0.4% 0.4 % Drop  Commonly known as: ACULAR     moxifloxacin 0.5 % ophthalmic solution  Commonly known as: VIGAMOX     prednisoLONE acetate 1 % Drps  Commonly known as: PRED FORTE               Where to Get Your Medications        These medications were sent to [facility] [address] [address]      Phone: [phone]   allopurinoL 300 MG tablet  apixaban 5 mg Tab  benzonatate 200 MG capsule  dexAMETHasone 4 MG Tab  folic acid 1 MG tablet  MOVANTIK 25 mg tablet  oxyCODONE-acetaminophen  mg per tablet  pantoprazole 40 MG tablet  thiamine 100 MG tablet          Explained in detail to the patient about the discharge plan, medications, and follow-up visits. Pt understands and agrees with the treatment plan  Discharge Disposition: Home or Self Care   Discharged Condition: stable  Diet-   Dietary Orders (From admission, onward)       Start     Ordered    05/11/24 9978   Dietary nutrition supplements Boost Original Nutritional Drink - Vanilla; Daily  Continuous        Question Answer Comment   Select PO Supplement: Boost Original Nutritional Drink - Vanilla    Frequency: Daily        05/11/24 1243    05/08/24 1131  Diet Adult Regular  Diet effective now         05/08/24 1130                   Medications Per DC med rec  Activities as tolerated   Follow-up Information       [facility] Follow up in 1 week(s).    Why: Cancer Center will contact you to schedule an appointment.  Contact information:  [address] [place] [address]  [phone]             [facility] Follow up.    Why: Please call if you have questions or concerns regarding your oxygen.  Contact information:  [facility] [facility] [address] [phone]             [facility], Provider Follow up.    Why: needs a pcp f/up in 1-2 weeks                         For further questions contact hospitalist office    Discharge time 33 minutes    For worsening symptoms, chest pain, shortness of breath, increased abdominal pain, high grade fever, stroke or stroke like symptoms, immediately go to the nearest Emergency Room or call 911 as soon as possible.      Carmen Wade M.D on 5/15/2024. at 1:17 PM.

## 2024-05-15 NOTE — PLAN OF CARE
Patient in need of a PCP, home health, and home oxygen. CM called scheduling [phone], hospital follow up will contact patient to schedule appointment. Home oxygen referral sent to [facility]. Home health referral sent to [facility] via [employer].

## 2024-05-20 ENCOUNTER — TELEPHONE (OUTPATIENT)
Dept: HEMATOLOGY/ONCOLOGY | Facility: CLINIC | Age: 54
End: 2024-05-20
Payer: MEDICAID

## 2024-05-20 NOTE — TELEPHONE ENCOUNTER
Adán was calling for a gas card and any kind of financial assistance since he is not working. In looking at his upcoming appointments I noticed he has not been seen in the clinic yet. He will see Dr. Arriola on Friday, May 24. I explained that the clinic would be making referrals to the [facility] once he is seen. I suggested that he also talk to the staff in [place] about other referrals such as the [facility]. He appreciated the information and will ask on Friday.

## 2024-05-23 ENCOUNTER — OFFICE VISIT (OUTPATIENT)
Dept: FAMILY MEDICINE | Facility: CLINIC | Age: 54
End: 2024-05-23
Payer: MEDICAID

## 2024-05-23 VITALS
HEIGHT: 67 IN | OXYGEN SATURATION: 98 % | WEIGHT: 128 LBS | RESPIRATION RATE: 18 BRPM | HEART RATE: 92 BPM | SYSTOLIC BLOOD PRESSURE: 125 MMHG | DIASTOLIC BLOOD PRESSURE: 77 MMHG | TEMPERATURE: 98 F | BODY MASS INDEX: 20.09 KG/M2

## 2024-05-23 DIAGNOSIS — I87.1 SUPERIOR VENA CAVA SYNDROME: ICD-10-CM

## 2024-05-23 DIAGNOSIS — C80.1 SMALL CELL CARCINOMA: Primary | ICD-10-CM

## 2024-05-23 DIAGNOSIS — K76.9 LIVER LESION: ICD-10-CM

## 2024-05-23 DIAGNOSIS — R13.10 DYSPHAGIA, UNSPECIFIED TYPE: ICD-10-CM

## 2024-05-23 DIAGNOSIS — D49.9 TUMOR THROMBUS OF INFERIOR VENA CAVA: ICD-10-CM

## 2024-05-23 DIAGNOSIS — D64.9 NORMOCYTIC ANEMIA: ICD-10-CM

## 2024-05-23 DIAGNOSIS — I82.220 TUMOR THROMBUS OF INFERIOR VENA CAVA: ICD-10-CM

## 2024-05-23 DIAGNOSIS — R22.1 NECK SWELLING: ICD-10-CM

## 2024-05-23 PROBLEM — F19.11 HISTORY OF SUBSTANCE ABUSE: Status: ACTIVE | Noted: 2024-05-23

## 2024-05-23 PROCEDURE — 3078F DIAST BP <80 MM HG: CPT | Mod: CPTII,,, | Performed by: FAMILY MEDICINE

## 2024-05-23 PROCEDURE — 3074F SYST BP LT 130 MM HG: CPT | Mod: CPTII,,, | Performed by: FAMILY MEDICINE

## 2024-05-23 PROCEDURE — 99215 OFFICE O/P EST HI 40 MIN: CPT | Mod: ,,, | Performed by: FAMILY MEDICINE

## 2024-05-23 RX ORDER — ALBUTEROL SULFATE 90 UG/1
2 AEROSOL, METERED RESPIRATORY (INHALATION) EVERY 6 HOURS PRN
COMMUNITY
Start: 2024-05-17

## 2024-05-23 RX ORDER — DEXAMETHASONE 4 MG/1
TABLET ORAL
Qty: 15 TABLET | Refills: 0 | Status: SHIPPED | OUTPATIENT
Start: 2024-05-23 | End: 2024-06-01

## 2024-05-23 NOTE — PROGRESS NOTES
Transitional Care Note  Subjective:         Patient ID: Adán Dinero is a 54 y.o. male.  Chief Complaint: Establish Care and Transitional Care ([facility] Discharge f/u 5/15/24 - Acute hypoxic resp failure, R lung cancer, Acute SVC syndrome, BUE Swelling, Acute dysphagia, Neck/facial swelling, Liver lesion (suspected METS), Normocytic Anemia, Lung mass right upper, Postobstructive pneumonia, Tumour thrombus on lovenox)    Family and/or Caretaker present at visit?  No.  Diagnostic tests reviewed/disposition: No diagnosic tests pending after this hospitalization.  Disease/illness education: Acute hypoxic respiratory failure, Lung cancer, Acute SVC syndrome, tumour thrombus.   Home health/community services discussion/referrals: Patient does not have home health established from hospital visit.  They do not need home health.  If needed, we will set up home health for the patient.   Establishment or re-establishment of referral orders for community resources: No other necessary community resources.   Discussion with other health care providers: No discussion with other health care providers necessary.   Has appointment with oncologist tomorrow and next week an appointment for a PICC line. Currently lives in [place], [place], he plans on finding a PCP closer to home after today.       Review of Systems   Respiratory:  Positive for chest tightness and shortness of breath.    Cardiovascular:  Positive for chest pain.   Gastrointestinal:  Positive for abdominal pain. Negative for constipation, diarrhea, nausea and vomiting.        Dysphagia secondary to neck swelling   Musculoskeletal:  Positive for neck pain and neck stiffness.        Neck swelling   Neurological:  Positive for dizziness. Negative for headaches.       Objective:      Physical Exam  Vitals reviewed.   Constitutional:       General: He is not in acute distress.     Appearance: Normal appearance. He is not ill-appearing.   Cardiovascular:      Rate and  Rhythm: Normal rate and regular rhythm.      Pulses: Normal pulses.      Heart sounds: Normal heart sounds. No murmur heard.     No friction rub. No gallop.   Pulmonary:      Effort: No respiratory distress.      Breath sounds: No wheezing, rhonchi or rales.   Musculoskeletal:         General: No swelling.      Right lower leg: No edema.      Left lower leg: No edema.   Lymphadenopathy:      Cervical: Cervical adenopathy present.   Skin:     General: Skin is warm and dry.   Neurological:      General: No focal deficit present.      Mental Status: He is alert.   Psychiatric:         Mood and Affect: Mood normal.         Behavior: Behavior normal.         Assessment:       1. Small cell carcinoma    2. Superior vena cava syndrome    3. Dysphagia, unspecified type    4. Liver lesion    5. Neck swelling    6. 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 Small cell carcinoma  Followed by Oncology   2. Superior vena cava syndrome  -secondary to lung cancer. Dysphagia also secondary to SVC syndrome.   3. Dysphagia, unspecified type  -secondary to SVC syndrome  4. Liver lesion  Followed by Oncology  5. Neck swelling  - dexAMETHasone (DECADRON) 4 MG Tab; Take 1 tablet (4 mg total) by mouth every 12 (twelve) hours for 5 days, THEN 1 tablet (4 mg total) once daily for 5 days.  Dispense: 15 tablet; Refill: 0    6. Normocytic anemia  Stable. Currently on Lovenox for Thrombus. Will need to be closely monitored.   7. Tumor thrombus of inferior vena cava  On lovenox.       Current Outpatient Medications:     albuterol (PROVENTIL/VENTOLIN HFA) 90 mcg/actuation inhaler, Inhale 2 puffs into the lungs every 6 (six) hours as needed for Shortness of Breath., Disp: , Rfl:     allopurinoL (ZYLOPRIM) 300 MG tablet, Take 1 tablet (300 mg total) by mouth once daily., Disp: 30 tablet, Rfl: 2    apixaban (ELIQUIS) 5 mg Tab, Take 1 tablet (5 mg total) by mouth 2 (two) times daily., Disp: 60 tablet, Rfl:  5    folic acid (FOLVITE) 1 MG tablet, Take 1 tablet (1 mg total) by mouth once daily., Disp: 30 tablet, Rfl: 2    naloxegoL (MOVANTIK) 25 mg tablet, Take 25 mg by mouth once daily., Disp: 30 tablet, Rfl: 0    oxyCODONE-acetaminophen (PERCOCET)  mg per tablet, Take 1 tablet by mouth every 6 (six) hours as needed for Pain (g89.1)., Disp: 28 tablet, Rfl: 0    pantoprazole (PROTONIX) 40 MG tablet, Take 1 tablet (40 mg total) by mouth before breakfast., Disp: 30 tablet, Rfl: 2    thiamine 100 MG tablet, Take 1 tablet (100 mg total) by mouth 3 (three) times daily., Disp: 90 tablet, Rfl: 0    acetaminophen (TYLENOL) 650 MG TbSR, Take 650 mg by mouth as needed., Disp: , Rfl:     dexAMETHasone (DECADRON) 4 MG Tab, Take 1 tablet (4 mg total) by mouth every 12 (twelve) hours for 5 days, THEN 1 tablet (4 mg total) once daily for 5 days., Disp: 15 tablet, Rfl: 0

## 2024-05-24 ENCOUNTER — OFFICE VISIT (OUTPATIENT)
Dept: HEMATOLOGY/ONCOLOGY | Facility: CLINIC | Age: 54
End: 2024-05-24
Payer: MEDICAID

## 2024-05-24 VITALS
WEIGHT: 124.63 LBS | RESPIRATION RATE: 18 BRPM | HEART RATE: 93 BPM | SYSTOLIC BLOOD PRESSURE: 120 MMHG | BODY MASS INDEX: 20.03 KG/M2 | TEMPERATURE: 98 F | OXYGEN SATURATION: 100 % | DIASTOLIC BLOOD PRESSURE: 79 MMHG | HEIGHT: 66 IN

## 2024-05-24 DIAGNOSIS — C80.1 SMALL CELL CARCINOMA: ICD-10-CM

## 2024-05-24 DIAGNOSIS — C34.90 SMALL CELL LUNG CANCER IN ADULT: Primary | ICD-10-CM

## 2024-05-24 DIAGNOSIS — R91.8 LUNG MASS: ICD-10-CM

## 2024-05-24 PROCEDURE — 3078F DIAST BP <80 MM HG: CPT | Mod: CPTII,,, | Performed by: STUDENT IN AN ORGANIZED HEALTH CARE EDUCATION/TRAINING PROGRAM

## 2024-05-24 PROCEDURE — 1111F DSCHRG MED/CURRENT MED MERGE: CPT | Mod: CPTII,,, | Performed by: STUDENT IN AN ORGANIZED HEALTH CARE EDUCATION/TRAINING PROGRAM

## 2024-05-24 PROCEDURE — 99215 OFFICE O/P EST HI 40 MIN: CPT | Mod: ,,, | Performed by: STUDENT IN AN ORGANIZED HEALTH CARE EDUCATION/TRAINING PROGRAM

## 2024-05-24 PROCEDURE — 3008F BODY MASS INDEX DOCD: CPT | Mod: CPTII,,, | Performed by: STUDENT IN AN ORGANIZED HEALTH CARE EDUCATION/TRAINING PROGRAM

## 2024-05-24 PROCEDURE — 1159F MED LIST DOCD IN RCRD: CPT | Mod: CPTII,,, | Performed by: STUDENT IN AN ORGANIZED HEALTH CARE EDUCATION/TRAINING PROGRAM

## 2024-05-24 PROCEDURE — 3074F SYST BP LT 130 MM HG: CPT | Mod: CPTII,,, | Performed by: STUDENT IN AN ORGANIZED HEALTH CARE EDUCATION/TRAINING PROGRAM

## 2024-05-30 ENCOUNTER — OFFICE VISIT (OUTPATIENT)
Dept: HEMATOLOGY/ONCOLOGY | Facility: CLINIC | Age: 54
End: 2024-05-30
Payer: MEDICAID

## 2024-05-30 VITALS
HEART RATE: 104 BPM | HEIGHT: 66 IN | SYSTOLIC BLOOD PRESSURE: 136 MMHG | BODY MASS INDEX: 20.39 KG/M2 | WEIGHT: 126.88 LBS | OXYGEN SATURATION: 99 % | TEMPERATURE: 98 F | RESPIRATION RATE: 14 BRPM | DIASTOLIC BLOOD PRESSURE: 85 MMHG

## 2024-05-30 DIAGNOSIS — G89.3 CANCER RELATED PAIN: ICD-10-CM

## 2024-05-30 DIAGNOSIS — R11.2 CHEMOTHERAPY INDUCED NAUSEA AND VOMITING: Primary | ICD-10-CM

## 2024-05-30 DIAGNOSIS — T45.1X5A CHEMOTHERAPY INDUCED NAUSEA AND VOMITING: Primary | ICD-10-CM

## 2024-05-30 DIAGNOSIS — K76.9 LIVER LESION: ICD-10-CM

## 2024-05-30 DIAGNOSIS — R19.7 DIARRHEA, UNSPECIFIED TYPE: Primary | ICD-10-CM

## 2024-05-30 DIAGNOSIS — C80.1 SMALL CELL CARCINOMA: ICD-10-CM

## 2024-05-30 PROCEDURE — 3075F SYST BP GE 130 - 139MM HG: CPT | Mod: CPTII,,, | Performed by: NURSE PRACTITIONER

## 2024-05-30 PROCEDURE — 99215 OFFICE O/P EST HI 40 MIN: CPT | Mod: ,,, | Performed by: NURSE PRACTITIONER

## 2024-05-30 PROCEDURE — 3079F DIAST BP 80-89 MM HG: CPT | Mod: CPTII,,, | Performed by: NURSE PRACTITIONER

## 2024-05-30 PROCEDURE — 1160F RVW MEDS BY RX/DR IN RCRD: CPT | Mod: CPTII,,, | Performed by: NURSE PRACTITIONER

## 2024-05-30 PROCEDURE — 3008F BODY MASS INDEX DOCD: CPT | Mod: CPTII,,, | Performed by: NURSE PRACTITIONER

## 2024-05-30 PROCEDURE — 1159F MED LIST DOCD IN RCRD: CPT | Mod: CPTII,,, | Performed by: NURSE PRACTITIONER

## 2024-05-30 RX ORDER — DIPHENOXYLATE HYDROCHLORIDE AND ATROPINE SULFATE 2.5; .025 MG/1; MG/1
1 TABLET ORAL 4 TIMES DAILY PRN
Qty: 30 TABLET | Refills: 4 | Status: SHIPPED | OUTPATIENT
Start: 2024-05-30 | End: 2025-05-30

## 2024-05-30 RX ORDER — OLANZAPINE 5 MG/1
TABLET ORAL
Qty: 16 TABLET | Refills: 0 | Status: SHIPPED | OUTPATIENT
Start: 2024-05-30

## 2024-05-30 RX ORDER — DEXAMETHASONE 4 MG/1
TABLET ORAL
Qty: 12 TABLET | Refills: 0 | Status: SHIPPED | OUTPATIENT
Start: 2024-05-30

## 2024-05-30 RX ORDER — OXYCODONE AND ACETAMINOPHEN 10; 325 MG/1; MG/1
1 TABLET ORAL EVERY 6 HOURS PRN
Qty: 28 TABLET | Refills: 0 | Status: SHIPPED | OUTPATIENT
Start: 2024-05-30 | End: 2024-06-06

## 2024-05-30 RX ORDER — PROCHLORPERAZINE MALEATE 5 MG
5 TABLET ORAL EVERY 6 HOURS PRN
Qty: 30 TABLET | Refills: 3 | Status: SHIPPED | OUTPATIENT
Start: 2024-05-30

## 2024-05-30 NOTE — PROGRESS NOTES
Referring provider:  Dr. Campbell    Reason for consultation:  Small-cell lung cancer      Diagnosis:  Small-cell lung cancer, extensive stage.      Current treatment:    05/11/2024-current:  Carbo etoposide atezolizumab, atezolizumab was omitted on 1st cycle due to being inpatient.      Treatment history:      HPI:    Patient was a 54-year-old male with history of alcohol use presented to the hospital at [place] with symptoms of left neck swelling and change in speech.  In the ER he would soft tissue neck CT done which revealed generalized edema throughout the soft tissue as well as right jugular vein engorgement and a large lung mass in the right lung apex concerning for malignancy.  Patient was started on Lovenox 1 milligram/kg b.i.d. 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  A CT of the abdomen and pelvis on 05/06/2024 revealed questionable metastases to the liver.  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  Patient received cycle 1 of carbo etoposide on 05/11/2024 while inpatient at [facility].  He was referred to our clinic for outpatient care and further treatment management.  He presented to clinic on 05/24/2024 to establish care.    Patient reports history of smoking, half pack per day, 6 pack of beer daily, prior history of recreational drug use.  Last use 5 years back.  He lives with a friend.  He works offshore on oil field.  Denies family history of malignancy.        Interval history:      Today, 05/24/2024, patient reports symptoms of right posterior chest wall pain as well as dyspnea on exertion.  He is currently using intermittent supplemental oxygen via nasal cannula at home.  He reports tolerating his 1st cycle of treatment well and reports improvement in his symptoms of neck swelling as well as arm  swelling.          Pathology       05/10/2024 for our pulmonary lymph node biopsy small-cell carcinoma, grade 3 neuroendocrine carcinoma with extensive necrosis.  Lymph node pulmonary 7 L EBUS biopsy small-cell carcinoma, with extensive necrosis.      Imaging     05/07/2024 MRI brain with and without contrast:  No obvious metastatic disease seen however large areas of cerebral convexity specifically on the right side and to a lesser extent on the left side are not well evaluated on this examination due to artifact.    05/06/2024 CT abdomen pelvis with IV contrast:  Nonspecific hypodense lesion is identified in the liver may represent metastatic disease.  Other secondary findings as noted above.  The liver mass is 9 mm in size.        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       Past Surgical History:   Procedure Laterality Date    CATARACT EXTRACTION W/  INTRAOCULAR LENS IMPLANT Left 06/28/2023    Dr Fritz Cornejo    ENDOBRONCHIAL ULTRASOUND N/A 05/08/2024    Dr Kayden Smart    HERNIA REPAIR  1993       Family History   Problem Relation Name Age of Onset    No Known Problems Mother      No Known Problems Father         Social History     Socioeconomic History    Marital status: Single   Tobacco Use    Smoking status: Every Day     Current packs/day: 0.25     Types: Cigarettes    Smokeless tobacco: Former   Substance and Sexual Activity    Alcohol use: Yes     Comment: 1/2 pint of whiskey daily       Current Outpatient Medications   Medication Sig Dispense Refill    albuterol (PROVENTIL/VENTOLIN HFA) 90  mcg/actuation inhaler Inhale 2 puffs into the lungs every 6 (six) hours as needed for Shortness of Breath.      allopurinoL (ZYLOPRIM) 300 MG tablet Take 1 tablet (300 mg total) by mouth once daily. 30 tablet 2    apixaban (ELIQUIS) 5 mg Tab Take 1 tablet (5 mg total) by mouth 2 (two) times daily. 60 tablet 5    dexAMETHasone (DECADRON) 4 MG Tab Take 1 tablet (4 mg total) by mouth every 12 (twelve) hours for 5 days, THEN 1 tablet (4 mg total) once daily for 5 days. 15 tablet 0    dexAMETHasone (DECADRON) 4 MG Tab Dexamethasone 4 mg tablet, take 2 tablets on days 2-4 of chemotherapy cycle 1-4. 12 tablet 0    folic acid (FOLVITE) 1 MG tablet Take 1 tablet (1 mg total) by mouth once daily. 30 tablet 2    naloxegoL (MOVANTIK) 25 mg tablet Take 25 mg by mouth once daily. 30 tablet 0    OLANZapine (ZYPREXA) 5 MG tablet Take 1 tablet by mouth nightly on days 1-4 of chemotherapy treatment.  Will only take for cycles 1-4. 16 tablet 0    pantoprazole (PROTONIX) 40 MG tablet Take 1 tablet (40 mg total) by mouth before breakfast. 30 tablet 2    prochlorperazine (COMPAZINE) 5 MG tablet Take 1 tablet (5 mg total) by mouth every 6 (six) hours as needed for Nausea. 30 tablet 3    thiamine 100 MG tablet Take 1 tablet (100 mg total) by mouth 3 (three) times daily. 90 tablet 0    acetaminophen (TYLENOL) 650 MG TbSR Take 650 mg by mouth as needed. (Patient not taking: Reported on 5/24/2024)      diphenoxylate-atropine 2.5-0.025 mg (LOMOTIL) 2.5-0.025 mg per tablet Take 1 tablet by mouth 4 (four) times daily as needed for Diarrhea. 30 tablet 4    oxyCODONE-acetaminophen (PERCOCET)  mg per tablet Take 1 tablet by mouth every 6 (six) hours as needed for Pain (g89.1). 28 tablet 0     No current facility-administered medications for this visit.       Review of patient's allergies indicates:  No Known Allergies      Review of systems  CONSTITUTIONAL: no fevers, no chills, no weight loss, no fatigue, no weakness  HEMATOLOGIC: no  abnormal bleeding, no abnormal bruising, no drenching night sweats  ONCOLOGIC: no new masses or lumps  HEENT: no vision loss, no tinnitus or hearing loss, no nose bleeding, no dysphagia, no odynophagia  CVS: no chest pain, no palpitations, no dyspnea on exertion  RESP: no shortness of breath, no hemoptysis, no cough  GI: no nausea, no vomiting, no diarrhea, no constipation, no melena, no hematochezia, no hematemesis, no abdominal pain, no increase in abdominal girth  : no dysuria, no hematuria, no hesitancy, no scrotal swelling, no discharge  INTEGUMENT: no rashes, no abnormal bruising, no nail pitting, no hyperpigmentation  NEURO: no falls, no memory loss, no paresthesias or dysesthesias, no urofecal incontinence or retention, no loss of strength on any extremity  MSK: no back pain, no new joint pain, no joint swelling  PSYCH: no suicidal or homicidal ideation, no depression, no insomnia, no anhedonia  ENDOCRINE: no heat or cold intolerance, no polyuria, no polydipsia      Physical Exam:  Vitals:    05/30/24 0949   BP: 136/85   Pulse: 104   Resp: 14   Temp: 97.8 °F (36.6 °C)         GA: AAOx3, NAD  HEENT: NCAT, moist oral mucous membranes, extensive engorgement of neck veins.  Hoarse voice.  CVS: s1s2 RRR, no M/R/G  RESP:  RUL reduced air entry. TTP over right chest wall.   ABD: soft, NT, ND, BS+, no hepatosplenomegaly  EXT: no deformities, no pedal edema  SKIN: no rashes, warm and dry  NEURO: normal mentation, strength 5/5 on all 4 extremities, no sensory deficits        Assessment    # Small-cell lung cancer, probable extensive stage possible liver metastases   Reviewed pathology report and imaging studies   Discussed with patient the diagnosis of small-cell lung cancer, the aggressive nature of disease, the questionable liver involvement and likely advanced incurable stage with palliative nature of his treatment  Patient was status post 1st cycle of carbo etoposide while inpatient, cycle 1 day 1 on  05/11/2024  He was referred for port placement however surgery deemed it appropriate for a PICC line given the concern for SVC syndrome and SVC thrombus.    Patient was agreeable to proceed with palliative systemic chemotherapy with carbo etoposide atezolizumab      Plan   Chemo education   Bone scan  Radiation oncology referral considerations of concurrent radiation therapy given questionable nature of liver met  Obtain CT chest with contrast report from [facility]  Status post PICC placement with surgery   Home health for PICC care  Plan to proceed with cycle 2 day 1 on 06/03/2024, labs the day prior, orders placed for insurance authorization.    Chemotherapy Education, 5/30/2024:    Mr. Dinero is here today for his chemotherapy education for Small cell carcinoma.  This treatment is first-line palliative intent.  He will receive Atezolizumab, Carboplatin, and Etoposide on Day 1 of the first 4 cycles;  Day 2 of the first 4 cycles, Etoposide, and starting with cycle 5 day 1, on Atezolizumab.   We discussed the side effects of all drugs, how to manage the side effects, dosage, timing, and when he should notify the clinic of adverse side effects.  Nausea medication Olanzapine, Dexamethasone, and Prochlorperazine were sent to his pharmacy with instructions on how to take.  Patient was instructed to purchase OTC Imodium with instructions on how to take.  [facility] Teaching Sheets were reviewed with the patient and a copy given for his reference.  The patient was given a teaching binder with the 24-hour clinic number.  He verbalized the risks and benefits of this treatment.  All questions were answered.  Informed consent was reviewed and signed by the patient.    Mr. Snyder has a PICC line in the left antecubital fossa.  No signs of redness, edema, or drainage.  Requested pain medication today for cancer related pain in the neck extending to the back.   checked.  Percocet  mg PO refilled for 7 days.   Patient will begin cycle 2, day 1 on Soha 3, 2024, with labs to be done on June 2, 2024.    DISCUSSION:    1.  A total of 60 minutes were spent in counseling today, in which 100% were face-to-face.  At today's therapy teaching session, we discussed the patient's cancer diagnosis as well as planned therapy regimen, protocol, side effects and toxicities.  A handout of each therapeutic agent in the regimen was provided and reviewed in detail.    2.  The following side effects were discussed but not limited to:                a.  Discussed the risk of infection while on therapy related to pancytopenia, specifically a decrease in their white blood cell count.  Instructed to contact our office for temperature >100.4 F, chills, sudden onset cough or shortness of breath, symptoms of a urinary tract infection.                b.  Discussed the risk of anemia. Instructed to contact our office for dizziness, heart palpitations, or extreme or sudden changes in weakness.                c.  Discussed the risk of thrombocytopenia, which increases the risk of bruising or bleeding.  Instructed the patient to contact our office for spontaneous signs of bleeding, including nose bleeds, bleeding from the gums or mouth, blood in sputum, urine or stool and unusual or excessive bruising or rash.                d.  Discussed GI side effects including weight changes, changes in appetite, altered sense of taste, stomatitis, nausea, vomiting, diarrhea, constipation, and heartburn.                e.  Discussed  side effects including painful urination, changes in the amount of urination, possible urine color changes.  Discussed fertility issues and to prevent  pregnancy if of child bearing age.                f.  Discussed neurological side effects including the risk of peripheral neuropathy, either temporary or permanent.                g.  Discussed the potential for skin, hair, and nail changes.       3.  Instructed to contact our office  for discussion of medication changes, the addition of vitamin and/or herbal supplementation as they may interact with some chemotherapy agents.    4.  Discussed dietary modifications and the need to maintain adequate caloric intake and proper oral hydration.  Recommended 64 ounces of fluid per day.    5.  Discussed anti-emetic protocol and bowel regimen protocol.    6.  Office contact information given including after hours number.  Discussed there is an oncologist on call 24/7, 365 days including weekends.  Provided primary nurse's information .    7.  In summary, the patient is in agreement with the plan of care.  Questions appeared to be answered to their satisfaction. Consented the patient to the treatment plan and the patient was educated on the planned duration of the treatment and schedule of the treatment administration. Copy to be scanned into the chart.    All questions answered to the satisfaction of the patient and family.     Follow up appointments given to patient.      The patient is in agreement with today's plan of care.  All questions answered.  Patient is aware to contact our office for any problems or concerns prior to next visit.      Mel Lindsey, MSN-ED, APRN, AGACNP-BC, OCN

## 2024-06-03 ENCOUNTER — OFFICE VISIT (OUTPATIENT)
Dept: HEMATOLOGY/ONCOLOGY | Facility: CLINIC | Age: 54
End: 2024-06-03
Payer: MEDICAID

## 2024-06-03 VITALS
DIASTOLIC BLOOD PRESSURE: 80 MMHG | TEMPERATURE: 98 F | HEART RATE: 100 BPM | HEIGHT: 66 IN | OXYGEN SATURATION: 95 % | WEIGHT: 126.88 LBS | BODY MASS INDEX: 20.39 KG/M2 | SYSTOLIC BLOOD PRESSURE: 116 MMHG | RESPIRATION RATE: 14 BRPM

## 2024-06-03 DIAGNOSIS — K76.9 LIVER LESION: ICD-10-CM

## 2024-06-03 DIAGNOSIS — C80.1 SMALL CELL CARCINOMA: Primary | ICD-10-CM

## 2024-06-03 DIAGNOSIS — Z51.11 ENCOUNTER FOR ANTINEOPLASTIC CHEMOTHERAPY: ICD-10-CM

## 2024-06-03 PROCEDURE — 3079F DIAST BP 80-89 MM HG: CPT | Mod: CPTII,,, | Performed by: STUDENT IN AN ORGANIZED HEALTH CARE EDUCATION/TRAINING PROGRAM

## 2024-06-03 PROCEDURE — 3074F SYST BP LT 130 MM HG: CPT | Mod: CPTII,,, | Performed by: STUDENT IN AN ORGANIZED HEALTH CARE EDUCATION/TRAINING PROGRAM

## 2024-06-03 PROCEDURE — 1111F DSCHRG MED/CURRENT MED MERGE: CPT | Mod: CPTII,,, | Performed by: STUDENT IN AN ORGANIZED HEALTH CARE EDUCATION/TRAINING PROGRAM

## 2024-06-03 PROCEDURE — 3008F BODY MASS INDEX DOCD: CPT | Mod: CPTII,,, | Performed by: STUDENT IN AN ORGANIZED HEALTH CARE EDUCATION/TRAINING PROGRAM

## 2024-06-03 PROCEDURE — 99215 OFFICE O/P EST HI 40 MIN: CPT | Mod: ,,, | Performed by: STUDENT IN AN ORGANIZED HEALTH CARE EDUCATION/TRAINING PROGRAM

## 2024-06-03 PROCEDURE — 1159F MED LIST DOCD IN RCRD: CPT | Mod: CPTII,,, | Performed by: STUDENT IN AN ORGANIZED HEALTH CARE EDUCATION/TRAINING PROGRAM

## 2024-06-03 RX ORDER — PROCHLORPERAZINE EDISYLATE 5 MG/ML
10 INJECTION INTRAMUSCULAR; INTRAVENOUS ONCE AS NEEDED
Status: CANCELLED | OUTPATIENT
Start: 2024-06-03

## 2024-06-03 RX ORDER — EPINEPHRINE 0.3 MG/.3ML
0.3 INJECTION SUBCUTANEOUS ONCE AS NEEDED
Status: CANCELLED | OUTPATIENT
Start: 2024-06-05

## 2024-06-03 RX ORDER — DEXAMETHASONE 4 MG/1
8 TABLET ORAL DAILY
Qty: 6 TABLET | Refills: 3 | Status: SHIPPED | OUTPATIENT
Start: 2024-06-03

## 2024-06-03 RX ORDER — SODIUM CHLORIDE 0.9 % (FLUSH) 0.9 %
10 SYRINGE (ML) INJECTION
Status: CANCELLED | OUTPATIENT
Start: 2024-06-05

## 2024-06-03 RX ORDER — PROCHLORPERAZINE MALEATE 5 MG
10 TABLET ORAL EVERY 6 HOURS PRN
Qty: 20 TABLET | Refills: 5 | Status: SHIPPED | OUTPATIENT
Start: 2024-06-03

## 2024-06-03 RX ORDER — SODIUM CHLORIDE 0.9 % (FLUSH) 0.9 %
10 SYRINGE (ML) INJECTION
Status: CANCELLED | OUTPATIENT
Start: 2024-06-04

## 2024-06-03 RX ORDER — PROCHLORPERAZINE EDISYLATE 5 MG/ML
10 INJECTION INTRAMUSCULAR; INTRAVENOUS ONCE AS NEEDED
Status: CANCELLED | OUTPATIENT
Start: 2024-06-05

## 2024-06-03 RX ORDER — SODIUM CHLORIDE 0.9 % (FLUSH) 0.9 %
10 SYRINGE (ML) INJECTION
Status: CANCELLED | OUTPATIENT
Start: 2024-06-03

## 2024-06-03 RX ORDER — PROCHLORPERAZINE EDISYLATE 5 MG/ML
10 INJECTION INTRAMUSCULAR; INTRAVENOUS ONCE AS NEEDED
Status: CANCELLED | OUTPATIENT
Start: 2024-06-04

## 2024-06-03 RX ORDER — HEPARIN 100 UNIT/ML
500 SYRINGE INTRAVENOUS
Status: CANCELLED | OUTPATIENT
Start: 2024-06-04

## 2024-06-03 RX ORDER — HEPARIN 100 UNIT/ML
500 SYRINGE INTRAVENOUS
Status: CANCELLED | OUTPATIENT
Start: 2024-06-05

## 2024-06-03 RX ORDER — DIPHENHYDRAMINE HYDROCHLORIDE 50 MG/ML
50 INJECTION INTRAMUSCULAR; INTRAVENOUS ONCE AS NEEDED
Status: CANCELLED | OUTPATIENT
Start: 2024-06-03

## 2024-06-03 RX ORDER — HEPARIN 100 UNIT/ML
500 SYRINGE INTRAVENOUS
Status: CANCELLED | OUTPATIENT
Start: 2024-06-03

## 2024-06-03 RX ORDER — EPINEPHRINE 0.3 MG/.3ML
0.3 INJECTION SUBCUTANEOUS ONCE AS NEEDED
Status: CANCELLED | OUTPATIENT
Start: 2024-06-04

## 2024-06-03 RX ORDER — OLANZAPINE 5 MG/1
TABLET ORAL
Qty: 4 TABLET | Refills: 3 | Status: SHIPPED | OUTPATIENT
Start: 2024-06-03

## 2024-06-03 RX ORDER — DIPHENHYDRAMINE HYDROCHLORIDE 50 MG/ML
50 INJECTION INTRAMUSCULAR; INTRAVENOUS ONCE AS NEEDED
Status: CANCELLED | OUTPATIENT
Start: 2024-06-04

## 2024-06-03 RX ORDER — DIPHENHYDRAMINE HYDROCHLORIDE 50 MG/ML
50 INJECTION INTRAMUSCULAR; INTRAVENOUS ONCE AS NEEDED
Status: CANCELLED | OUTPATIENT
Start: 2024-06-05

## 2024-06-03 RX ORDER — EPINEPHRINE 0.3 MG/.3ML
0.3 INJECTION SUBCUTANEOUS ONCE AS NEEDED
Status: CANCELLED | OUTPATIENT
Start: 2024-06-03

## 2024-06-03 NOTE — PROGRESS NOTES
Referring provider:  Dr. Campbell    Reason for consultation:  Small-cell lung cancer      Diagnosis:  Small-cell lung cancer, extensive stage.      Current treatment:    05/11/2024-current:  Carbo etoposide atezolizumab, atezolizumab was omitted on 1st cycle due to being inpatient.      Treatment history:      HPI:    Patient was a 54-year-old male with history of alcohol use presented to the hospital at [place] with symptoms of left neck swelling and change in speech.  In the ER he would soft tissue neck CT done which revealed generalized edema throughout the soft tissue as well as right jugular vein engorgement and a large lung mass in the right lung apex concerning for malignancy.  Patient was started on Lovenox 1 milligram/kg b.i.d. 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  A CT of the abdomen and pelvis on 05/06/2024 revealed questionable metastases to the liver.  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  Patient received cycle 1 of carbo etoposide on 05/11/2024 while inpatient at [facility].  He was referred to our clinic for outpatient care and further treatment management.  He presented to clinic on 05/24/2024 to establish care.    Patient reports history of smoking, half pack per day, 6 pack of beer daily, prior history of recreational drug use.  Last use 5 years back.  He lives with a friend.  He works offshore on oil field.  Denies family history of malignancy.        Interval history:    Today, 6024, patient reports intermittent neck swelling since his last follow-up with us.  He denies any headaches or vision changes.  He reports dyspnea on exertion which is stable.  She denies any new foci of pain.  He reports having logistical issues to pay for gas for multiple treatment visits.      Pathology        05/10/2024 for our pulmonary lymph node biopsy small-cell carcinoma, grade 3 neuroendocrine carcinoma with extensive necrosis.  Lymph node pulmonary 7 L EBUS biopsy small-cell carcinoma, with extensive necrosis.      Imaging     05/07/2024 MRI brain with and without contrast:  No obvious metastatic disease seen however large areas of cerebral convexity specifically on the right side and to a lesser extent on the left side are not well evaluated on this examination due to artifact.    05/06/2024 CT abdomen pelvis with IV contrast:  Nonspecific hypodense lesion is identified in the liver may represent metastatic disease.  Other secondary findings as noted above.  The liver mass is 9 mm in size.      Laboratory   06/02/2024:  Creatinine 0 point 7, LFTs unremarkable, TSH 0.98, WBC count 14.6, hemoglobin 14, MCV 95.4, platelet count 267, cortisol pending.      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       Past Surgical History:   Procedure Laterality Date    CATARACT EXTRACTION W/  INTRAOCULAR LENS IMPLANT Left 06/28/2023    Dr Fritz Cornejo    ENDOBRONCHIAL ULTRASOUND N/A 05/08/2024    Dr Kayden Smart    HERNIA REPAIR  1993       Family History   Problem Relation Name Age of Onset    No Known Problems Mother      No Known Problems Father         Social History     Socioeconomic History    Marital status: Single   Tobacco Use    Smoking status: Every Day     Current packs/day: 0.25     Types: Cigarettes    Smokeless tobacco: Former   Substance and Sexual Activity    Alcohol use: Yes     Comment: 1/2 christin  of [facility] daily       Current Outpatient Medications   Medication Sig Dispense Refill    albuterol (PROVENTIL/VENTOLIN HFA) 90 mcg/actuation inhaler Inhale 2 puffs into the lungs every 6 (six) hours as needed for Shortness of Breath.      allopurinoL (ZYLOPRIM) 300 MG tablet Take 1 tablet (300 mg total) by mouth once daily. 30 tablet 2    apixaban (ELIQUIS) 5 mg Tab Take 1 tablet (5 mg total) by mouth 2 (two) times daily. 60 tablet 5    dexAMETHasone (DECADRON) 4 MG Tab Dexamethasone 4 mg tablet, take 2 tablets on days 2-4 of chemotherapy cycle 1-4. 12 tablet 0    diphenoxylate-atropine 2.5-0.025 mg (LOMOTIL) 2.5-0.025 mg per tablet Take 1 tablet by mouth 4 (four) times daily as needed for Diarrhea. 30 tablet 4    folic acid (FOLVITE) 1 MG tablet Take 1 tablet (1 mg total) by mouth once daily. 30 tablet 2    naloxegoL (MOVANTIK) 25 mg tablet Take 25 mg by mouth once daily. 30 tablet 0    OLANZapine (ZYPREXA) 5 MG tablet Take 1 tablet by mouth nightly on days 1-4 of chemotherapy treatment.  Will only take for cycles 1-4. 16 tablet 0    oxyCODONE-acetaminophen (PERCOCET)  mg per tablet Take 1 tablet by mouth every 6 (six) hours as needed for Pain (g89.1). 28 tablet 0    pantoprazole (PROTONIX) 40 MG tablet Take 1 tablet (40 mg total) by mouth before breakfast. 30 tablet 2    prochlorperazine (COMPAZINE) 5 MG tablet Take 1 tablet (5 mg total) by mouth every 6 (six) hours as needed for Nausea. 30 tablet 3    thiamine 100 MG tablet Take 1 tablet (100 mg total) by mouth 3 (three) times daily. 90 tablet 0    acetaminophen (TYLENOL) 650 MG TbSR Take 650 mg by mouth as needed. (Patient not taking: Reported on 5/24/2024)       No current facility-administered medications for this visit.       Review of patient's allergies indicates:  No Known Allergies      Review of systems  CONSTITUTIONAL: no fevers, no chills, no weight loss, no fatigue, no weakness  HEMATOLOGIC: no abnormal bleeding, no abnormal bruising, no  drenching night sweats  ONCOLOGIC: no new masses or lumps  HEENT: no vision loss, no tinnitus or hearing loss, no nose bleeding, no dysphagia, no odynophagia  CVS: no chest pain, no palpitations, no dyspnea on exertion  RESP: no shortness of breath, no hemoptysis, no cough  GI: no nausea, no vomiting, no diarrhea, no constipation, no melena, no hematochezia, no hematemesis, no abdominal pain, no increase in abdominal girth  : no dysuria, no hematuria, no hesitancy, no scrotal swelling, no discharge  INTEGUMENT: no rashes, no abnormal bruising, no nail pitting, no hyperpigmentation  NEURO: no falls, no memory loss, no paresthesias or dysesthesias, no urofecal incontinence or retention, no loss of strength on any extremity  MSK: no back pain, no new joint pain, no joint swelling  PSYCH: no suicidal or homicidal ideation, no depression, no insomnia, no anhedonia  ENDOCRINE: no heat or cold intolerance, no polyuria, no polydipsia      Physical Exam:  Vitals:    06/03/24 0851   BP: 116/80   Pulse: 100   Resp: 14   Temp: 97.8 °F (36.6 °C)       GA: AAOx3, NAD  HEENT: NCAT, moist oral mucous membranes, extensive engorgement of neck veins.  Hoarse voice.  CVS: s1s2 RRR, no M/R/G  RESP:  RUL reduced air entry. TTP over right chest wall.   ABD: soft, NT, ND, BS+, no hepatosplenomegaly  EXT: no deformities, no pedal edema  SKIN: no rashes, warm and dry  NEURO: normal mentation, strength 5/5 on all 4 extremities, no sensory deficits        Assessment    # Small-cell lung cancer, probable extensive stage possible liver metastases   Reviewed pathology report and imaging studies   Discussed with patient the diagnosis of small-cell lung cancer, the aggressive nature of disease, the questionable liver involvement and likely advanced incurable stage with palliative nature of his treatment  Patient was status post 1st cycle of carbo etoposide while inpatient, cycle 1 day 1 on 05/11/2024  He was referred for port placement however  surgery deemed it appropriate for a PICC line given the concern for SVC syndrome and SVC thrombus.    Patient was agreeable to proceed with palliative systemic chemotherapy with carbo etoposide atezolizumab      Plan   Reviewed labs, proceed with cycle 2 day 1 of carbo etoposide atezolizumab  Bone scan, pending  Radiation oncology referral considerations of concurrent radiation therapy given questionable nature of liver met  Obtain CT chest with contrast report from [facility], pending consultation.  Status post PICC placement with surgery , will set up for home health for PICC flush  Follow-up in 3 weeks, labs the day prior to cycle 3.        Portions of the record may have been created with voice recognition software. Occasional wrong-word or sound-a-like substitutions may have occurred due to the inherent limitations of voice recognition software.

## 2024-06-04 DIAGNOSIS — C80.1 SMALL CELL CARCINOMA: Primary | ICD-10-CM

## 2024-06-25 ENCOUNTER — OFFICE VISIT (OUTPATIENT)
Dept: HEMATOLOGY/ONCOLOGY | Facility: CLINIC | Age: 54
End: 2024-06-25
Payer: MEDICAID

## 2024-06-25 VITALS
TEMPERATURE: 98 F | WEIGHT: 126.69 LBS | OXYGEN SATURATION: 100 % | SYSTOLIC BLOOD PRESSURE: 125 MMHG | HEIGHT: 67 IN | RESPIRATION RATE: 20 BRPM | HEART RATE: 87 BPM | DIASTOLIC BLOOD PRESSURE: 84 MMHG | BODY MASS INDEX: 19.89 KG/M2

## 2024-06-25 DIAGNOSIS — C34.81 SMALL CELL LUNG CANCER, OVERLAPPING SITES OF RIGHT LUNG: Primary | ICD-10-CM

## 2024-06-25 DIAGNOSIS — E83.42 HYPOMAGNESEMIA: ICD-10-CM

## 2024-06-25 PROCEDURE — 1159F MED LIST DOCD IN RCRD: CPT | Mod: CPTII,,,

## 2024-06-25 PROCEDURE — 3079F DIAST BP 80-89 MM HG: CPT | Mod: CPTII,,,

## 2024-06-25 PROCEDURE — 99215 OFFICE O/P EST HI 40 MIN: CPT | Mod: ,,,

## 2024-06-25 PROCEDURE — 3074F SYST BP LT 130 MM HG: CPT | Mod: CPTII,,,

## 2024-06-25 PROCEDURE — 3008F BODY MASS INDEX DOCD: CPT | Mod: CPTII,,,

## 2024-06-25 RX ORDER — HEPARIN 100 UNIT/ML
500 SYRINGE INTRAVENOUS
Status: CANCELLED | OUTPATIENT
Start: 2024-06-25

## 2024-06-25 RX ORDER — SODIUM CHLORIDE 0.9 % (FLUSH) 0.9 %
10 SYRINGE (ML) INJECTION
Status: CANCELLED | OUTPATIENT
Start: 2024-06-25

## 2024-06-25 RX ORDER — DIPHENHYDRAMINE HYDROCHLORIDE 50 MG/ML
50 INJECTION INTRAMUSCULAR; INTRAVENOUS ONCE AS NEEDED
Status: CANCELLED | OUTPATIENT
Start: 2024-06-25

## 2024-06-25 RX ORDER — LANOLIN ALCOHOL/MO/W.PET/CERES
400 CREAM (GRAM) TOPICAL DAILY
Qty: 30 TABLET | Refills: 6 | Status: SHIPPED | OUTPATIENT
Start: 2024-06-25 | End: 2024-06-25

## 2024-06-25 RX ORDER — EPINEPHRINE 0.3 MG/.3ML
0.3 INJECTION SUBCUTANEOUS ONCE AS NEEDED
Status: CANCELLED | OUTPATIENT
Start: 2024-06-25

## 2024-06-25 RX ORDER — PROCHLORPERAZINE EDISYLATE 5 MG/ML
10 INJECTION INTRAMUSCULAR; INTRAVENOUS ONCE AS NEEDED
Status: CANCELLED | OUTPATIENT
Start: 2024-06-25

## 2024-06-25 NOTE — PROGRESS NOTES
Referring provider:  Dr. Campbell    Reason for consultation:  Small-cell lung cancer      Diagnosis:  Small-cell lung cancer, extensive stage.      Current treatment:    05/11/2024-current:  Carbo etoposide atezolizumab, atezolizumab was omitted on 1st cycle due to being inpatient.      Treatment history:      HPI:    Patient was a 54-year-old male with history of alcohol use presented to the hospital at [place] with symptoms of left neck swelling and change in speech.  In the ER he would soft tissue neck CT done which revealed generalized edema throughout the soft tissue as well as right jugular vein engorgement and a large lung mass in the right lung apex concerning for malignancy.  Patient was started on Lovenox 1 milligram/kg b.i.d. 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  A CT of the abdomen and pelvis on 05/06/2024 revealed questionable metastases to the liver.  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  Patient received cycle 1 of carbo etoposide on 05/11/2024 while inpatient at [facility].  He was referred to our clinic for outpatient care and further treatment management.  He presented to clinic on 05/24/2024 to establish care.    Patient reports history of smoking, half pack per day, 6 pack of beer daily, prior history of recreational drug use.  Last use 5 years back.  He lives with a friend.  He works offshore on oil field.  Denies family history of malignancy.        Interval history:    Today,06/25/24 patient returns for follow-up of lung cancer.  He denies any headaches or vision changes.  He reports dyspnea on exertion which is stable.  She denies any new foci of pain.  He reports having logistical issues to pay for gas for multiple treatment visits.      Pathology       05/10/2024 for our pulmonary  lymph node biopsy small-cell carcinoma, grade 3 neuroendocrine carcinoma with extensive necrosis.  Lymph node pulmonary 7 L EBUS biopsy small-cell carcinoma, with extensive necrosis.      Imaging     06/10/24 NM Bone scan: There are multiple areas of increased uptake within the thoracic and lumbar region possible related to arthritis, however, a metastatic disease must be considered.     05/07/2024 MRI brain with and without contrast:  No obvious metastatic disease seen however large areas of cerebral convexity specifically on the right side and to a lesser extent on the left side are not well evaluated on this examination due to artifact.  05/06/24 CT Chest w/ contrast: Large mass in right lung apex right hilum and mediastinum measuring 11 cm X 9 cm X 7.2 cm causing obstruction of the superior vena cava at the junction of the brachiocephalic veins.   05/06/24 CT soft tissue neck w/ contrast- BINTA  05/06/24 CT Head/Brain w/ contrast: - BINTA  05/06/2024 CT abdomen pelvis with IV contrast:  Nonspecific hypodense lesion is identified in the liver may represent metastatic disease.  Other secondary findings as noted above.  The liver mass is 9 mm in size.      Laboratory   06/24/24: cr 0.7, alb 3.3, ca 9.4, LFTs WNL, TSH 1.83, mag 2.0, phosphorus 4.1,  wbc 5.3, hgb 13.4, plt 469, cortisol pending   06/02/2024:  Creatinine 0 point 7, LFTs unremarkable, TSH 0.98, WBC count 14.6, hemoglobin 14, MCV 95.4, platelet count 267, cortisol pending.      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       Past Surgical History:    Procedure Laterality Date    CATARACT EXTRACTION W/  INTRAOCULAR LENS IMPLANT Left 06/28/2023    Dr Fritz Cornejo    ENDOBRONCHIAL ULTRASOUND N/A 05/08/2024    Dr Kayden Smart    HERNIA REPAIR  1993       Family History   Problem Relation Name Age of Onset    No Known Problems Mother      No Known Problems Father         Social History     Socioeconomic History    Marital status: Single   Tobacco Use    Smoking status: Every Day     Current packs/day: 0.25     Types: Cigarettes    Smokeless tobacco: Former   Substance and Sexual Activity    Alcohol use: Yes     Comment: 1/2 pint of whiskey daily       Current Outpatient Medications   Medication Sig Dispense Refill    albuterol (PROVENTIL/VENTOLIN HFA) 90 mcg/actuation inhaler Inhale 2 puffs into the lungs every 6 (six) hours as needed for Shortness of Breath.      allopurinoL (ZYLOPRIM) 300 MG tablet Take 1 tablet (300 mg total) by mouth once daily. 30 tablet 2    apixaban (ELIQUIS) 5 mg Tab Take 1 tablet (5 mg total) by mouth 2 (two) times daily. 60 tablet 5    diphenoxylate-atropine 2.5-0.025 mg (LOMOTIL) 2.5-0.025 mg per tablet Take 1 tablet by mouth 4 (four) times daily as needed for Diarrhea. 30 tablet 4    folic acid (FOLVITE) 1 MG tablet Take 1 tablet (1 mg total) by mouth once daily. 30 tablet 2    OLANZapine (ZYPREXA) 5 MG tablet Take 1 tablet by mouth nightly on days 1-4 of chemotherapy treatment.  Will only take for cycles 1-4. 16 tablet 0    pantoprazole (PROTONIX) 40 MG tablet Take 1 tablet (40 mg total) by mouth before breakfast. 30 tablet 2    prochlorperazine (COMPAZINE) 5 MG tablet Take 1 tablet (5 mg total) by mouth every 6 (six) hours as needed for Nausea. 30 tablet 3    acetaminophen (TYLENOL) 650 MG TbSR Take 650 mg by mouth as needed. (Patient not taking: Reported on 5/24/2024)      dexAMETHasone (DECADRON) 4 MG Tab Dexamethasone 4 mg tablet, take 2 tablets on days 2-4 of chemotherapy cycle 1-4. (Patient not taking: Reported on  6/25/2024) 12 tablet 0    dexAMETHasone (DECADRON) 4 MG Tab Take 2 tablets (8 mg total) by mouth once daily. on days 2-4 of each chemotherapy cycle (Patient not taking: Reported on 6/25/2024) 6 tablet 3    OLANZapine (ZYPREXA) 5 MG tablet Take 1 tablet by mouth nightly on days 1-4 of each chemotherapy cycle. 4 tablet 3    prochlorperazine (COMPAZINE) 5 MG tablet Take 2 tablets (10 mg total) by mouth every 6 (six) hours as needed for Nausea. 20 tablet 5     No current facility-administered medications for this visit.       Review of patient's allergies indicates:  No Known Allergies      Review of systems  CONSTITUTIONAL: no fevers, no chills, no weight loss, no fatigue, no weakness  HEMATOLOGIC: no abnormal bleeding, no abnormal bruising, no drenching night sweats  ONCOLOGIC: no new masses or lumps  HEENT: no vision loss, no tinnitus or hearing loss, no nose bleeding, no dysphagia, no odynophagia  CVS: no chest pain, no palpitations, no dyspnea on exertion  RESP: no shortness of breath, no hemoptysis, no cough  GI: no nausea, no vomiting, no diarrhea, no constipation, no melena, no hematochezia, no hematemesis, no abdominal pain, no increase in abdominal girth  : no dysuria, no hematuria, no hesitancy, no scrotal swelling, no discharge  INTEGUMENT: no rashes, no abnormal bruising, no nail pitting, no hyperpigmentation  NEURO: no falls, no memory loss, no paresthesias or dysesthesias, no urofecal incontinence or retention, no loss of strength on any extremity  MSK: no back pain, no new joint pain, no joint swelling  PSYCH: no suicidal or homicidal ideation, no depression, no insomnia, no anhedonia  ENDOCRINE: no heat or cold intolerance, no polyuria, no polydipsia      Physical Exam:  Vitals:    06/25/24 0846   BP: 125/84   Pulse: 87   Resp: 20   Temp: 98.2 °F (36.8 °C)       GA: AAOx3, NAD  HEENT: NCAT, moist oral mucous membranes, extensive engorgement of neck veins.  Hoarse voice.  CVS: s1s2 RRR, no M/R/G  RESP:   RUL reduced air entry. TTP over right chest wall.   ABD: soft, NT, ND, BS+, no hepatosplenomegaly  EXT: no deformities, no pedal edema  SKIN: no rashes, warm and dry  NEURO: normal mentation, strength 5/5 on all 4 extremities, no sensory deficits        Assessment    # Small-cell lung cancer, probable extensive stage possible liver metastases   Reviewed pathology report and imaging studies   Discussed with patient the diagnosis of small-cell lung cancer, the aggressive nature of disease, the questionable liver involvement and likely advanced incurable stage with palliative nature of his treatment  Patient was status post 1st cycle of carbo etoposide while inpatient, cycle 1 day 1 on 05/11/2024  He was referred for port placement however surgery deemed it appropriate for a PICC line given the concern for SVC syndrome and SVC thrombus.    Patient was agreeable to proceed with palliative systemic chemotherapy with carbo etoposide atezolizumab      Plan   Reviewed labs, proceed with cycle 3 day 1 of carbo etoposide atezolizumab  Radiation oncology   Continue with  for PICC placement  maintenance.   Follow-up in 3 weeks, labs the day prior to cycle 4.

## 2024-06-26 RX ORDER — SODIUM CHLORIDE 0.9 % (FLUSH) 0.9 %
10 SYRINGE (ML) INJECTION
Status: CANCELLED | OUTPATIENT
Start: 2024-06-26

## 2024-06-26 RX ORDER — PROCHLORPERAZINE EDISYLATE 5 MG/ML
10 INJECTION INTRAMUSCULAR; INTRAVENOUS ONCE AS NEEDED
Status: CANCELLED | OUTPATIENT
Start: 2024-06-27

## 2024-06-26 RX ORDER — DIPHENHYDRAMINE HYDROCHLORIDE 50 MG/ML
50 INJECTION INTRAMUSCULAR; INTRAVENOUS ONCE AS NEEDED
Status: CANCELLED | OUTPATIENT
Start: 2024-06-27

## 2024-06-26 RX ORDER — PROCHLORPERAZINE EDISYLATE 5 MG/ML
10 INJECTION INTRAMUSCULAR; INTRAVENOUS ONCE AS NEEDED
Status: CANCELLED | OUTPATIENT
Start: 2024-06-26

## 2024-06-26 RX ORDER — EPINEPHRINE 0.3 MG/.3ML
0.3 INJECTION SUBCUTANEOUS ONCE AS NEEDED
Status: CANCELLED | OUTPATIENT
Start: 2024-06-26

## 2024-06-26 RX ORDER — HEPARIN 100 UNIT/ML
500 SYRINGE INTRAVENOUS
Status: CANCELLED | OUTPATIENT
Start: 2024-06-26

## 2024-06-26 RX ORDER — SODIUM CHLORIDE 0.9 % (FLUSH) 0.9 %
10 SYRINGE (ML) INJECTION
Status: CANCELLED | OUTPATIENT
Start: 2024-06-27

## 2024-06-26 RX ORDER — HEPARIN 100 UNIT/ML
500 SYRINGE INTRAVENOUS
Status: CANCELLED | OUTPATIENT
Start: 2024-06-27

## 2024-06-26 RX ORDER — DIPHENHYDRAMINE HYDROCHLORIDE 50 MG/ML
50 INJECTION INTRAMUSCULAR; INTRAVENOUS ONCE AS NEEDED
Status: CANCELLED | OUTPATIENT
Start: 2024-06-26

## 2024-06-26 RX ORDER — EPINEPHRINE 0.3 MG/.3ML
0.3 INJECTION SUBCUTANEOUS ONCE AS NEEDED
Status: CANCELLED | OUTPATIENT
Start: 2024-06-27

## 2024-07-16 ENCOUNTER — OFFICE VISIT (OUTPATIENT)
Dept: HEMATOLOGY/ONCOLOGY | Facility: CLINIC | Age: 54
End: 2024-07-16
Payer: MEDICAID

## 2024-07-16 VITALS
WEIGHT: 129.88 LBS | HEART RATE: 102 BPM | BODY MASS INDEX: 20.38 KG/M2 | HEIGHT: 67 IN | TEMPERATURE: 99 F | SYSTOLIC BLOOD PRESSURE: 131 MMHG | OXYGEN SATURATION: 100 % | DIASTOLIC BLOOD PRESSURE: 73 MMHG | RESPIRATION RATE: 20 BRPM

## 2024-07-16 DIAGNOSIS — C34.81 SMALL CELL LUNG CANCER, OVERLAPPING SITES OF RIGHT LUNG: Primary | ICD-10-CM

## 2024-07-16 DIAGNOSIS — E86.0 DEHYDRATION: Primary | ICD-10-CM

## 2024-07-16 PROCEDURE — 3075F SYST BP GE 130 - 139MM HG: CPT | Mod: CPTII,,,

## 2024-07-16 PROCEDURE — 1159F MED LIST DOCD IN RCRD: CPT | Mod: CPTII,,,

## 2024-07-16 PROCEDURE — 3078F DIAST BP <80 MM HG: CPT | Mod: CPTII,,,

## 2024-07-16 PROCEDURE — 3008F BODY MASS INDEX DOCD: CPT | Mod: CPTII,,,

## 2024-07-16 PROCEDURE — 99215 OFFICE O/P EST HI 40 MIN: CPT | Mod: ,,,

## 2024-07-16 RX ORDER — PROCHLORPERAZINE EDISYLATE 5 MG/ML
10 INJECTION INTRAMUSCULAR; INTRAVENOUS ONCE AS NEEDED
Status: CANCELLED | OUTPATIENT
Start: 2024-07-16

## 2024-07-16 RX ORDER — EPINEPHRINE 0.3 MG/.3ML
0.3 INJECTION SUBCUTANEOUS ONCE AS NEEDED
Status: CANCELLED | OUTPATIENT
Start: 2024-07-18

## 2024-07-16 RX ORDER — DIPHENHYDRAMINE HYDROCHLORIDE 50 MG/ML
50 INJECTION INTRAMUSCULAR; INTRAVENOUS ONCE AS NEEDED
Status: CANCELLED | OUTPATIENT
Start: 2024-07-17

## 2024-07-16 RX ORDER — SODIUM CHLORIDE 0.9 % (FLUSH) 0.9 %
10 SYRINGE (ML) INJECTION
OUTPATIENT
Start: 2024-07-16

## 2024-07-16 RX ORDER — DIPHENHYDRAMINE HYDROCHLORIDE 50 MG/ML
50 INJECTION INTRAMUSCULAR; INTRAVENOUS ONCE AS NEEDED
Status: CANCELLED | OUTPATIENT
Start: 2024-07-18

## 2024-07-16 RX ORDER — HEPARIN 100 UNIT/ML
500 SYRINGE INTRAVENOUS
Status: CANCELLED | OUTPATIENT
Start: 2024-07-18

## 2024-07-16 RX ORDER — SODIUM CHLORIDE 0.9 % (FLUSH) 0.9 %
10 SYRINGE (ML) INJECTION
Status: CANCELLED | OUTPATIENT
Start: 2024-07-18

## 2024-07-16 RX ORDER — DIPHENHYDRAMINE HYDROCHLORIDE 50 MG/ML
50 INJECTION INTRAMUSCULAR; INTRAVENOUS ONCE AS NEEDED
Status: CANCELLED | OUTPATIENT
Start: 2024-07-16

## 2024-07-16 RX ORDER — EPINEPHRINE 0.3 MG/.3ML
0.3 INJECTION SUBCUTANEOUS ONCE AS NEEDED
Status: CANCELLED | OUTPATIENT
Start: 2024-07-17

## 2024-07-16 RX ORDER — PROCHLORPERAZINE EDISYLATE 5 MG/ML
10 INJECTION INTRAMUSCULAR; INTRAVENOUS ONCE AS NEEDED
Status: CANCELLED | OUTPATIENT
Start: 2024-07-18

## 2024-07-16 RX ORDER — HEPARIN 100 UNIT/ML
500 SYRINGE INTRAVENOUS
Status: CANCELLED | OUTPATIENT
Start: 2024-07-17

## 2024-07-16 RX ORDER — EPINEPHRINE 0.3 MG/.3ML
0.3 INJECTION SUBCUTANEOUS ONCE AS NEEDED
Status: CANCELLED | OUTPATIENT
Start: 2024-07-16

## 2024-07-16 RX ORDER — HEPARIN 100 UNIT/ML
500 SYRINGE INTRAVENOUS
Status: CANCELLED | OUTPATIENT
Start: 2024-07-16

## 2024-07-16 RX ORDER — PROCHLORPERAZINE EDISYLATE 5 MG/ML
10 INJECTION INTRAMUSCULAR; INTRAVENOUS ONCE AS NEEDED
Status: CANCELLED | OUTPATIENT
Start: 2024-07-17

## 2024-07-16 RX ORDER — SODIUM CHLORIDE 0.9 % (FLUSH) 0.9 %
10 SYRINGE (ML) INJECTION
Status: CANCELLED | OUTPATIENT
Start: 2024-07-16

## 2024-07-16 RX ORDER — SODIUM CHLORIDE 0.9 % (FLUSH) 0.9 %
10 SYRINGE (ML) INJECTION
Status: CANCELLED | OUTPATIENT
Start: 2024-07-17

## 2024-07-16 RX ORDER — HEPARIN 100 UNIT/ML
500 SYRINGE INTRAVENOUS
OUTPATIENT
Start: 2024-07-16

## 2024-07-16 NOTE — PROGRESS NOTES
Referring provider:  Dr. Campbell    Reason for consultation:  Small-cell lung cancer      Diagnosis:  Small-cell lung cancer, extensive stage.      Current treatment:    05/11/2024-current:  Carbo etoposide atezolizumab, atezolizumab was omitted on 1st cycle due to being inpatient.      Treatment history:      HPI:    Patient was a 54-year-old male with history of alcohol use presented to the hospital at [facility] with symptoms of left neck swelling and change in speech.  In the ER he would soft tissue neck CT done which revealed generalized edema throughout the soft tissue as well as right jugular vein engorgement and a large lung mass in the right lung apex concerning for malignancy.  Patient was started on Lovenox 1 milligram/kg b.i.d. 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  A CT of the abdomen and pelvis on 05/06/2024 revealed questionable metastases to the liver.  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  Patient received cycle 1 of carbo etoposide on 05/11/2024 while inpatient at [facility].  He was referred to our clinic for outpatient care and further treatment management.  He presented to clinic on 05/24/2024 to establish care.    Patient reports history of smoking, half pack per day, 6 pack of beer daily, prior history of recreational drug use.  Last use 5 years back.  He lives with a friend.  He works offshore on oil field.  Denies family history of malignancy.        Interval history:    Today,07/16/24 patient returns for follow-up of lung cancer.  He is requesting help with nutritional supplements.He continues with XRT and notices an increase in fatigue since. He denies any headaches or vision changes.  He reports dyspnea on exertion which is stable.  She denies any new foci of pain.  He reports having  logistical issues to pay for gas for multiple treatment visits.      Pathology       05/10/2024 for our pulmonary lymph node biopsy small-cell carcinoma, grade 3 neuroendocrine carcinoma with extensive necrosis.  Lymph node pulmonary 7 L EBUS biopsy small-cell carcinoma, with extensive necrosis.      Imaging     06/10/24 NM Bone scan: There are multiple areas of increased uptake within the thoracic and lumbar region possible related to arthritis, however, a metastatic disease must be considered.     05/07/2024 MRI brain with and without contrast:  No obvious metastatic disease seen however large areas of cerebral convexity specifically on the right side and to a lesser extent on the left side are not well evaluated on this examination due to artifact.  05/06/24 CT Chest w/ contrast: Large mass in right lung apex right hilum and mediastinum measuring 11 cm X 9 cm X 7.2 cm causing obstruction of the superior vena cava at the junction of the brachiocephalic veins.   05/06/24 CT soft tissue neck w/ contrast- BINTA  05/06/24 CT Head/Brain w/ contrast: - BINTA  05/06/2024 CT abdomen pelvis with IV contrast:  Nonspecific hypodense lesion is identified in the liver may represent metastatic disease.  Other secondary findings as noted above.  The liver mass is 9 mm in size.      Laboratory   07/16/24: cr 3.52, alb 2.7, ca 8.9, LFTs WNL, mag 2.0, phosphorus 3.4, cortisol 19.8, tsh 1.34, wbc 5.23, hgb 9.8, plt 195, ANC 3.86  06/24/24: cr 0.7, alb 3.3, ca 9.4, LFTs WNL, TSH 1.83, mag 2.0, phosphorus 4.1,  wbc 5.3, hgb 13.4, plt 469, cortisol pending   06/02/2024:  Creatinine 0 point 7, LFTs unremarkable, TSH 0.98, WBC count 14.6, hemoglobin 14, MCV 95.4, platelet count 267, cortisol pending.      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       Past Surgical History:   Procedure Laterality Date    CATARACT EXTRACTION W/  INTRAOCULAR LENS IMPLANT Left 06/28/2023    Dr Fritz Cornejo    ENDOBRONCHIAL ULTRASOUND N/A 05/08/2024    Dr Kayden Smart    HERNIA REPAIR  1993       Family History   Problem Relation Name Age of Onset    No Known Problems Mother      No Known Problems Father         Social History     Socioeconomic History    Marital status: Single   Tobacco Use    Smoking status: Every Day     Current packs/day: 0.25     Types: Cigarettes    Smokeless tobacco: Former   Substance and Sexual Activity    Alcohol use: Yes     Comment: 1/2 pint of whiskey daily       Current Outpatient Medications   Medication Sig Dispense Refill    acetaminophen (TYLENOL) 650 MG TbSR Take 650 mg by mouth as needed. (Patient not taking: Reported on 5/24/2024)      albuterol (PROVENTIL/VENTOLIN HFA) 90 mcg/actuation inhaler Inhale 2 puffs into the lungs every 6 (six) hours as needed for Shortness of Breath.      allopurinoL (ZYLOPRIM) 300 MG tablet Take 1 tablet (300 mg total) by mouth once daily. 30 tablet 2    apixaban (ELIQUIS) 5 mg Tab Take 1 tablet (5 mg total) by mouth 2 (two) times daily. 60 tablet 5    dexAMETHasone (DECADRON) 4 MG Tab Dexamethasone 4 mg tablet, take 2 tablets on days 2-4 of chemotherapy cycle 1-4. (Patient not taking: Reported on 6/25/2024) 12 tablet 0    dexAMETHasone (DECADRON) 4 MG Tab Take 2 tablets (8 mg total) by mouth once daily. on days 2-4 of each chemotherapy cycle (Patient not taking: Reported on 6/25/2024) 6 tablet 3    diphenoxylate-atropine 2.5-0.025 mg (LOMOTIL) 2.5-0.025 mg per tablet Take 1 tablet by mouth 4 (four) times daily as needed for Diarrhea. 30 tablet 4    folic acid (FOLVITE) 1 MG tablet Take 1 tablet  (1 mg total) by mouth once daily. 30 tablet 2    OLANZapine (ZYPREXA) 5 MG tablet Take 1 tablet by mouth nightly on days 1-4 of chemotherapy treatment.  Will only take for cycles 1-4. 16 tablet 0    OLANZapine (ZYPREXA) 5 MG tablet Take 1 tablet by mouth nightly on days 1-4 of each chemotherapy cycle. 4 tablet 3    pantoprazole (PROTONIX) 40 MG tablet Take 1 tablet (40 mg total) by mouth before breakfast. 30 tablet 2    prochlorperazine (COMPAZINE) 5 MG tablet Take 1 tablet (5 mg total) by mouth every 6 (six) hours as needed for Nausea. 30 tablet 3    prochlorperazine (COMPAZINE) 5 MG tablet Take 2 tablets (10 mg total) by mouth every 6 (six) hours as needed for Nausea. 20 tablet 5     No current facility-administered medications for this visit.       Review of patient's allergies indicates:  No Known Allergies      Review of systems  CONSTITUTIONAL: no fevers, no chills, no weight loss, no fatigue, no weakness  HEMATOLOGIC: no abnormal bleeding, no abnormal bruising, no drenching night sweats  ONCOLOGIC: no new masses or lumps  HEENT: no vision loss, no tinnitus or hearing loss, no nose bleeding, no dysphagia, no odynophagia  CVS: no chest pain, no palpitations, no dyspnea on exertion  RESP: no shortness of breath, no hemoptysis, no cough  GI: no nausea, no vomiting, no diarrhea, no constipation, no melena, no hematochezia, no hematemesis, no abdominal pain, no increase in abdominal girth  : no dysuria, no hematuria, no hesitancy, no scrotal swelling, no discharge  INTEGUMENT: no rashes, no abnormal bruising, no nail pitting, no hyperpigmentation  NEURO: no falls, no memory loss, no paresthesias or dysesthesias, no urofecal incontinence or retention, no loss of strength on any extremity  MSK: no back pain, no new joint pain, no joint swelling  PSYCH: no suicidal or homicidal ideation, no depression, no insomnia, no anhedonia  ENDOCRINE: no heat or cold intolerance, no polyuria, no polydipsia      Physical  Exam:  Vitals:    07/16/24 0854   BP: 131/73   Pulse: 102   Resp: 20   Temp: 99.1 °F (37.3 °C)         GA: AAOx3, NAD  HEENT: NCAT, moist oral mucous membranes, extensive engorgement of neck veins.  Hoarse voice.  CVS: s1s2 RRR, no M/R/G  RESP:  RUL reduced air entry. TTP over right chest wall.   ABD: soft, NT, ND, BS+, no hepatosplenomegaly  EXT: no deformities, no pedal edema  SKIN: no rashes, warm and dry  NEURO: normal mentation, strength 5/5 on all 4 extremities, no sensory deficits        Assessment    # Small-cell lung cancer, probable extensive stage possible liver metastases   Reviewed pathology report and imaging studies   Discussed with patient the diagnosis of small-cell lung cancer, the aggressive nature of disease, the questionable liver involvement and likely advanced incurable stage with palliative nature of his treatment  Patient was status post 1st cycle of carbo etoposide while inpatient, cycle 1 day 1 on 05/11/2024  He was referred for port placement however surgery deemed it appropriate for a PICC line given the concern for SVC syndrome and SVC thrombus.    Patient was agreeable to proceed with palliative systemic chemotherapy with carbo etoposide atezolizumab      Plan   Reviewed labs, JESUS noted with GFR 18, will hold cycle 4 day 1 of carbo etoposide atezolizumab today  Will plan for 1 liter of NACl today and repeat cmp tomorrow. If no improvement will initiate ER visit for JESUS evaluation.   Continue with Radiation oncology   Continue with  for PICC maintenance.   Referral placed for ensure supplementation via [facility].

## 2024-07-24 ENCOUNTER — TELEPHONE (OUTPATIENT)
Dept: HEMATOLOGY/ONCOLOGY | Facility: CLINIC | Age: 54
End: 2024-07-24
Payer: MEDICAID

## 2024-07-24 NOTE — TELEPHONE ENCOUNTER
----- Message from Fabiana Gil sent at 7/24/2024 11:31 AM CDT -----  [facility]  I called patient, spoke with him he okay'd  to  the gas card  Thanks  Fabiana

## 2024-07-25 ENCOUNTER — TELEPHONE (OUTPATIENT)
Dept: CARDIOLOGY | Facility: HOSPITAL | Age: 54
End: 2024-07-25
Payer: MEDICAID

## 2024-07-25 NOTE — TELEPHONE ENCOUNTER
Shanique with [facility] called our office to get patient scheduled for tunneled catheter insertion.     He was recently diagnosed with cancer with mets and started chemotherapy approximately two months ago.  He was treated for JESUS at [facility] this past week or two and was discharged with a trialysis (temporary) catheter. He is supposed to start dialysis at their outpatient center this weekend. Called patient without success and left him a message. He is tentatively scheduled for Monday at this time. -ZT

## 2024-07-29 ENCOUNTER — HOSPITAL ENCOUNTER (OUTPATIENT)
Facility: HOSPITAL | Age: 54
Discharge: HOME | End: 2024-07-29
Attending: STUDENT IN AN ORGANIZED HEALTH CARE EDUCATION/TRAINING PROGRAM | Admitting: STUDENT IN AN ORGANIZED HEALTH CARE EDUCATION/TRAINING PROGRAM
Payer: MEDICAID

## 2024-07-29 VITALS
SYSTOLIC BLOOD PRESSURE: 137 MMHG | HEIGHT: 66 IN | BODY MASS INDEX: 22.18 KG/M2 | OXYGEN SATURATION: 96 % | HEART RATE: 115 BPM | DIASTOLIC BLOOD PRESSURE: 108 MMHG | WEIGHT: 138 LBS | TEMPERATURE: 98 F

## 2024-07-29 DIAGNOSIS — N17.9 AKI (ACUTE KIDNEY INJURY): ICD-10-CM

## 2024-07-29 LAB
ANION GAP SERPL CALC-SCNC: 12 MEQ/L
BUN SERPL-MCNC: 53.7 MG/DL (ref 8.4–25.7)
CALCIUM SERPL-MCNC: 9.8 MG/DL (ref 8.4–10.2)
CHLORIDE SERPL-SCNC: 104 MMOL/L (ref 98–107)
CO2 SERPL-SCNC: 24 MMOL/L (ref 22–29)
CREAT SERPL-MCNC: 11.99 MG/DL (ref 0.73–1.18)
CREAT/UREA NIT SERPL: 4
GFR SERPLBLD CREATININE-BSD FMLA CKD-EPI: 5 ML/MIN/1.73/M2
GLUCOSE SERPL-MCNC: 101 MG/DL (ref 74–100)
POTASSIUM SERPL-SCNC: 4.4 MMOL/L (ref 3.5–5.1)
SODIUM SERPL-SCNC: 140 MMOL/L (ref 136–145)

## 2024-07-29 PROCEDURE — 99152 MOD SED SAME PHYS/QHP 5/>YRS: CPT | Performed by: STUDENT IN AN ORGANIZED HEALTH CARE EDUCATION/TRAINING PROGRAM

## 2024-07-29 PROCEDURE — 36558 INSERT TUNNELED CV CATH: CPT | Mod: LT | Performed by: STUDENT IN AN ORGANIZED HEALTH CARE EDUCATION/TRAINING PROGRAM

## 2024-07-29 PROCEDURE — 63600175 PHARM REV CODE 636 W HCPCS: Performed by: STUDENT IN AN ORGANIZED HEALTH CARE EDUCATION/TRAINING PROGRAM

## 2024-07-29 PROCEDURE — 99203 OFFICE O/P NEW LOW 30 MIN: CPT | Mod: 25,,, | Performed by: STUDENT IN AN ORGANIZED HEALTH CARE EDUCATION/TRAINING PROGRAM

## 2024-07-29 PROCEDURE — 36558 INSERT TUNNELED CV CATH: CPT | Mod: LT,,, | Performed by: STUDENT IN AN ORGANIZED HEALTH CARE EDUCATION/TRAINING PROGRAM

## 2024-07-29 PROCEDURE — 99152 MOD SED SAME PHYS/QHP 5/>YRS: CPT | Mod: ,,, | Performed by: STUDENT IN AN ORGANIZED HEALTH CARE EDUCATION/TRAINING PROGRAM

## 2024-07-29 PROCEDURE — 99153 MOD SED SAME PHYS/QHP EA: CPT | Performed by: STUDENT IN AN ORGANIZED HEALTH CARE EDUCATION/TRAINING PROGRAM

## 2024-07-29 PROCEDURE — 25000003 PHARM REV CODE 250: Performed by: STUDENT IN AN ORGANIZED HEALTH CARE EDUCATION/TRAINING PROGRAM

## 2024-07-29 PROCEDURE — C1750 CATH, HEMODIALYSIS,LONG-TERM: HCPCS | Performed by: STUDENT IN AN ORGANIZED HEALTH CARE EDUCATION/TRAINING PROGRAM

## 2024-07-29 PROCEDURE — 80048 BASIC METABOLIC PNL TOTAL CA: CPT | Performed by: STUDENT IN AN ORGANIZED HEALTH CARE EDUCATION/TRAINING PROGRAM

## 2024-07-29 PROCEDURE — 77001 FLUOROGUIDE FOR VEIN DEVICE: CPT | Performed by: STUDENT IN AN ORGANIZED HEALTH CARE EDUCATION/TRAINING PROGRAM

## 2024-07-29 PROCEDURE — 36415 COLL VENOUS BLD VENIPUNCTURE: CPT | Performed by: STUDENT IN AN ORGANIZED HEALTH CARE EDUCATION/TRAINING PROGRAM

## 2024-07-29 PROCEDURE — 77001 FLUOROGUIDE FOR VEIN DEVICE: CPT | Mod: 26,,, | Performed by: STUDENT IN AN ORGANIZED HEALTH CARE EDUCATION/TRAINING PROGRAM

## 2024-07-29 DEVICE — GLIDEPATH HEMODIALYSIS CATH, ST, DL, 14.5 FR. 23CM
Type: IMPLANTABLE DEVICE | Site: CHEST  WALL | Status: FUNCTIONAL
Brand: GLIDEPATH LONG-TERM HEMODIALYSIS CATHETER WITH PRELOADED STYLET

## 2024-07-29 RX ORDER — LIDOCAINE HYDROCHLORIDE AND EPINEPHRINE 10; 10 MG/ML; UG/ML
INJECTION, SOLUTION INFILTRATION; PERINEURAL
Status: DISCONTINUED | OUTPATIENT
Start: 2024-07-29 | End: 2024-07-29 | Stop reason: HOSPADM

## 2024-07-29 RX ORDER — MIDAZOLAM HYDROCHLORIDE 1 MG/ML
INJECTION INTRAMUSCULAR; INTRAVENOUS
Status: DISCONTINUED | OUTPATIENT
Start: 2024-07-29 | End: 2024-07-29 | Stop reason: HOSPADM

## 2024-07-29 RX ORDER — MULTIVIT WITH MINERALS/HERBS
1 TABLET ORAL 3 TIMES DAILY
COMMUNITY

## 2024-07-29 RX ORDER — FENTANYL CITRATE 50 UG/ML
INJECTION, SOLUTION INTRAMUSCULAR; INTRAVENOUS
Status: DISCONTINUED | OUTPATIENT
Start: 2024-07-29 | End: 2024-07-29 | Stop reason: HOSPADM

## 2024-07-29 RX ORDER — CEFAZOLIN SODIUM 1 G/3ML
INJECTION, POWDER, FOR SOLUTION INTRAMUSCULAR; INTRAVENOUS
Status: DISCONTINUED | OUTPATIENT
Start: 2024-07-29 | End: 2024-07-29 | Stop reason: HOSPADM

## 2024-07-29 RX ORDER — SODIUM CHLORIDE 0.9 % (FLUSH) 0.9 %
10 SYRINGE (ML) INJECTION
Status: DISCONTINUED | OUTPATIENT
Start: 2024-07-29 | End: 2024-07-29 | Stop reason: HOSPADM

## 2024-07-29 NOTE — Clinical Note
Catheter tip was inserted into the sheath and advanced down into place. Cath tip placement was verified under fluoro and image was saved.

## 2024-07-29 NOTE — DISCHARGE SUMMARY
INTERVENTIONAL NEPHROLOGY DISCHARGE SUMMARY         Patient Name: Adán Dinero   1970    Procedure Date: 2024      In brief, Mr. Dinero underwent non-tunneled HD catheter conversion to a LIJ/LCW tunneled dialysis catheter. Pre-procedure ultrasound revealed appropriate entry into the LIJ vein. Externally, it appeared that the catheter was located in an ideal location for direct conversion. There was also signs on fluoroscopy that the catheter was well placed.    Conversion was uncomplicated.    Pre-Op Diagnosis: T82.4 Mechanical complication of vascular dialysis catheter, initial encounter, N17.9 Acute Kidney Injury (JESUS)  Post-Op Diagnosis: Same.    Discharge Instructions/Recommendations:  - Use tunneled dialysis catheter for HD.  - Pt may be discharged after successful monitoring in post-op area.  - Pt may resume home medications.    Thank you for allowing me the opportunity in taking care of this patient. Please reach me with any questions.    Abel Goldstein DO  Interventional Nephrology  Cell: [phone]

## 2024-07-29 NOTE — Clinical Note
The left chest and left neck was prepped. The site was prepped with ChloraPrep and Betadine. The patient was draped.

## 2024-07-29 NOTE — H&P
INTERVENTIONAL NEPHROLOGY HISTORY & PHYSICAL       Patient Name: Adán Amayaharika AAORN 1970    Date: 2024  Time: 11:22 AM         HPI: 54 y.o. male with JESUS on HD via left IJ temporary, non-tunneled HD catheter who presents with need for tunneled catheter insertion. Pt was admitted to an OSH that was not capable of insertion of a tunneled dialysis catheter, and was discharged to his now outpatient HD unit where plans were made to be referred to our service. Pt is being prepared for tunneled dialysis catheter insertion today.    Pt seen and examined at bedside in [facility] this AM. Family is not present. Risks and benefits of tunneled dialysis catheter insertion and intravenous conscious sedation was reviewed with the patient. The patient agrees to proceed with the intended procedure. Consents for both intravenous conscious sedation and procedure were signed and placed within the chart.       Review of Systems:  General:  No fatigue  Skin: No rashes  HEENT: No vision changes  CVS: No CP  RS: No SOB  GIT: No abdominal pain  Extremities: No swelling  Neurological:  No focal weakness  Psych: No depression    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      Past Surgical History:   Procedure Laterality Date    CATARACT EXTRACTION W/  INTRAOCULAR LENS IMPLANT Left 2023    Dr Fritz Cornejo    ENDOBRONCHIAL ULTRASOUND N/A 2024    Dr Kayden Smart    HERNIA REPAIR        Review of patient's allergies indicates:  No Known Allergies   Social History     Tobacco Use    Smoking status:  Former     Current packs/day: 0.25     Types: Cigarettes    Smokeless tobacco: Former   Substance Use Topics    Alcohol use: Not Currently     Comment: 1/2 pint of whiskey daily    Drug use: Not Currently      Family History   Problem Relation Name Age of Onset    No Known Problems Mother      No Known Problems Father           Current Facility-Administered Medications:     sodium chloride 0.9% flush 10 mL, 10 mL, Intravenous, PRN, Hasan, Abel, DO    Vital Signs:  Temp:  [98.2 °F (36.8 °C)] 98.2 °F (36.8 °C)  Pulse:  [105] 105  SpO2:  [95 %] 95 %  BP: (170)/(106) 170/106     Physical Exam:  General: NAD  HEENT: NC/AT, EOMI  CVS: RRR.  RS: breathing easily.  Abdominal: Soft, NT/ND.  Extremities: No edema b/l LE  Skin: No rash, no lesions.  Neurological: No focal deficits.  Psych: Normal affect  Dialysis Access: left internal jugular (LIJ) temporary, non-tunneled HD catheter without signs of bleeding/infection. Low cannulation in the neck.    Results:    Lab Results   Component Value Date     07/29/2024     06/12/2023    K 4.4 07/29/2024    K 4.3 06/12/2023     07/29/2024     06/12/2023    CO2 24 07/29/2024    CO2 28 06/12/2023    BUN 53.7 (H) 07/29/2024    BUN 10 06/12/2023    CREATININE 11.99 (H) 07/29/2024    CREATININE 0.8 06/12/2023     06/12/2023     Lab Results   Component Value Date    WBC 4.98 05/15/2024    WBC 12.64 05/11/2024    WBC 7.26 06/12/2023    HGB 12.4 (L) 05/15/2024    HGB 15.4 06/12/2023     05/15/2024     06/12/2023    MCV 95.9 (H) 05/15/2024    MCV 94 06/12/2023       Assessment and Plan:      JESUS on HD via left IJ temporary, non-tunneled HD catheter.  Mechanical complication of non-TDC.  Pt with JESUS on HD via left internal jugular (LIJ) temporary, non-tunneled HD catheter who presents today with need for tunneled catheter insertion. The pt is being prepared for tunneled dialysis catheter insertion today.  - Consents obtained and placed within  chart.  - Will proceed in cath lab setting today.    Please feel free to reach me with any questions.    Abel Goldstein,   Interventional Nephrology  Cell: [phone]

## 2024-07-29 NOTE — PROCEDURES
INTERVENTIONAL NEPHROLOGY PROCEDURE NOTE:   TEMPORARY TO TUNNELED DIALYSIS CATHETER CONVERSION       Patient Name: Adán Dinero  AGNIESZKA 1970    Procedure Date:    2024    Performing Physician:   Dr. Goldstein    Access History: Pt is with JESUS requiring HD.    Pre-Op Diagnosis: N17.9 Acute Kidney Injury (JESUS).  Post-Op Diagnosis: Same    Procedure: Conversion of temporary to tunneled dialysis catheter.    Indication: JESUS requiring HD.    Anatomical Site: left internal jugular (LIJ)/left chest wall (LCW)    Informed Consent:  The patient was evaluated in the pre-operative area with assessment including the American Society of Anesthesia risk classification. The procedure is discussed in detail including risks, benefits alternatives and options and the patient agrees to proceed. Informed consent was obtained from the patient.     Maximum sterile barrier technique: The patient was prepped and draped using chlorhexidine prep and maximum sterile barrier technique.    Sedation Note:  Risks and benefits of sedation were reviewed with the patient or surrogate, including bleeding, infection, nausea, vomiting, dizziness, instability, damage to a nerve, damage to a blood vessel, cellulitis, reaction to medications, amnesia, loss of consciousness, respiratory arrest, cardiac arrest.     The patient received the following medications: Versed 2 mg IV and Fentanyl 100 mcg IV; patient did remain alert, responsive, and conversational throughout the procedure. I was personally responsible for supervising the administration of moderate sedation services during the procedure performed and I affirm all the guidelines and requirements described in the CPT 2024 section on moderate sedation were followed, including the use of an independent trained observer who had no other duties during the procedure. Start sedation time was 1147 and stop time was 1215. The total face-to-face time was 28 minutes.    Procedure Steps:    The patient was prepped and draped in sterile fashion. The left internal jugular (LIJ) temporary HD catheter was found to have ideal point of insertion on the vessel. Local anesthesia was administered by injecting 1% lidocaine at the entry site. A 150 cm guide wire was passed into the old catheter, with the tip of the wire parked in the inferior vena cava as verified with fluoroscopy. At this time the old catheter was removed over the guide wire. The guide wire was carefully cleansed with betadine x 3 then saline x 3, the  then re-gloved.    The catheter exit site and tunnel was approximated and infiltrated with lidocaine. A stab incision was made at the exit site. The 23 cm (cuff-to-tip) tunneled dialysis catheter assembly with tunneler was then tunneled up through the exit site to the lateral aspect of the venotomy and the catheter pulled through the tunnel. The cuff was placed approximately 1.5 centimeters inside the tunnel. The 12 Fr dilator was passed freely over the wire; it was removed and replaced sequentially with 14 Fr and 16 Fr dilators. The permacath was then inserted via pull-away sheath method. The placement of the catheter tip (at the junction of the SVC and right atrium) and the top of the permacath was confirmed with fluoroscopy.     Catheter appeared to function well with various tests utilizing a 10 cc syringe. The catheter limbs were then flushed with saline, followed by instillation of appropriate amounts of heparin as marked by the catheter hub. Venotomy site and tunnel exit site was closed with monoderm suture, being careful to not carrion the catheter. Catheter hub was fixed to the chest wall using silk suture.      ASSESSMENT/PLAN:  - Successfully converted temporary dialysis catheter to left internal jugular (LIJ)/left chest wall (LCW) 23 cm (cuff-to-tip) TDC (BD Gl[employer]).    EBL: 5 cc    Complications: None    Post-op Instructions: The patient was given both verbal and written  post-op instructions. If excessive bleeding at the site, they have been instructed to call their physician or proceed to a local emergency room.    Orders to the dialysis unit: OK to use dialysis catheter for HD.    Thank you for allowing me the opportunity in taking care of this patient. Please reach me with any questions.    Abel Goldstein DO  Interventional Nephrology  [facility]  Cell: [phone]  Office: [phone]

## 2024-08-06 ENCOUNTER — OFFICE VISIT (OUTPATIENT)
Dept: HEMATOLOGY/ONCOLOGY | Facility: CLINIC | Age: 54
End: 2024-08-06
Payer: MEDICAID

## 2024-08-06 VITALS
DIASTOLIC BLOOD PRESSURE: 92 MMHG | RESPIRATION RATE: 16 BRPM | WEIGHT: 119.38 LBS | OXYGEN SATURATION: 100 % | TEMPERATURE: 98 F | HEART RATE: 107 BPM | BODY MASS INDEX: 19.19 KG/M2 | SYSTOLIC BLOOD PRESSURE: 134 MMHG | HEIGHT: 66 IN

## 2024-08-06 DIAGNOSIS — C34.81 SMALL CELL LUNG CANCER, OVERLAPPING SITES OF RIGHT LUNG: Primary | ICD-10-CM

## 2024-08-06 DIAGNOSIS — I82.220 TUMOR THROMBUS OF INFERIOR VENA CAVA: ICD-10-CM

## 2024-08-06 DIAGNOSIS — Z51.11 ENCOUNTER FOR ANTINEOPLASTIC CHEMOTHERAPY: ICD-10-CM

## 2024-08-06 DIAGNOSIS — D49.9 TUMOR THROMBUS OF INFERIOR VENA CAVA: ICD-10-CM

## 2024-08-06 DIAGNOSIS — K76.9 LIVER LESION: ICD-10-CM

## 2024-08-06 DIAGNOSIS — C80.1 SMALL CELL CARCINOMA: ICD-10-CM

## 2024-08-06 PROCEDURE — 3008F BODY MASS INDEX DOCD: CPT | Mod: CPTII,,, | Performed by: STUDENT IN AN ORGANIZED HEALTH CARE EDUCATION/TRAINING PROGRAM

## 2024-08-06 PROCEDURE — 99215 OFFICE O/P EST HI 40 MIN: CPT | Mod: ,,, | Performed by: STUDENT IN AN ORGANIZED HEALTH CARE EDUCATION/TRAINING PROGRAM

## 2024-08-06 PROCEDURE — 3075F SYST BP GE 130 - 139MM HG: CPT | Mod: CPTII,,, | Performed by: STUDENT IN AN ORGANIZED HEALTH CARE EDUCATION/TRAINING PROGRAM

## 2024-08-06 PROCEDURE — 3080F DIAST BP >= 90 MM HG: CPT | Mod: CPTII,,, | Performed by: STUDENT IN AN ORGANIZED HEALTH CARE EDUCATION/TRAINING PROGRAM

## 2024-08-06 PROCEDURE — 1159F MED LIST DOCD IN RCRD: CPT | Mod: CPTII,,, | Performed by: STUDENT IN AN ORGANIZED HEALTH CARE EDUCATION/TRAINING PROGRAM

## 2024-08-12 ENCOUNTER — OFFICE VISIT (OUTPATIENT)
Dept: HEMATOLOGY/ONCOLOGY | Facility: CLINIC | Age: 54
End: 2024-08-12
Payer: MEDICAID

## 2024-08-12 VITALS
DIASTOLIC BLOOD PRESSURE: 86 MMHG | HEART RATE: 109 BPM | RESPIRATION RATE: 18 BRPM | TEMPERATURE: 98 F | HEIGHT: 66 IN | WEIGHT: 117.63 LBS | OXYGEN SATURATION: 99 % | BODY MASS INDEX: 18.91 KG/M2 | SYSTOLIC BLOOD PRESSURE: 121 MMHG

## 2024-08-12 DIAGNOSIS — C34.81 MALIGNANT NEOPLASM OF OVERLAPPING SITES OF RIGHT LUNG: Primary | ICD-10-CM

## 2024-08-12 DIAGNOSIS — C34.81 SMALL CELL LUNG CANCER, OVERLAPPING SITES OF RIGHT LUNG: Primary | ICD-10-CM

## 2024-08-12 DIAGNOSIS — C34.90 MALIGNANT NEOPLASM OF UNSPECIFIED PART OF UNSPECIFIED BRONCHUS OR LUNG: ICD-10-CM

## 2024-08-12 PROCEDURE — 3008F BODY MASS INDEX DOCD: CPT | Mod: CPTII,,,

## 2024-08-12 PROCEDURE — 1159F MED LIST DOCD IN RCRD: CPT | Mod: CPTII,,,

## 2024-08-12 PROCEDURE — 3074F SYST BP LT 130 MM HG: CPT | Mod: CPTII,,,

## 2024-08-12 PROCEDURE — 3079F DIAST BP 80-89 MM HG: CPT | Mod: CPTII,,,

## 2024-08-12 PROCEDURE — 99215 OFFICE O/P EST HI 40 MIN: CPT | Mod: ,,,

## 2024-08-12 RX ORDER — PROCHLORPERAZINE EDISYLATE 5 MG/ML
10 INJECTION INTRAMUSCULAR; INTRAVENOUS ONCE AS NEEDED
Status: CANCELLED | OUTPATIENT
Start: 2024-08-13

## 2024-08-12 RX ORDER — HEPARIN 100 UNIT/ML
500 SYRINGE INTRAVENOUS
Status: CANCELLED | OUTPATIENT
Start: 2024-08-13

## 2024-08-12 RX ORDER — PROCHLORPERAZINE EDISYLATE 5 MG/ML
10 INJECTION INTRAMUSCULAR; INTRAVENOUS ONCE AS NEEDED
Status: CANCELLED | OUTPATIENT
Start: 2024-08-14

## 2024-08-12 RX ORDER — SODIUM CHLORIDE 0.9 % (FLUSH) 0.9 %
10 SYRINGE (ML) INJECTION
Status: CANCELLED | OUTPATIENT
Start: 2024-08-12

## 2024-08-12 RX ORDER — EPINEPHRINE 0.3 MG/.3ML
0.3 INJECTION SUBCUTANEOUS ONCE AS NEEDED
Status: CANCELLED | OUTPATIENT
Start: 2024-08-13

## 2024-08-12 RX ORDER — EPINEPHRINE 0.3 MG/.3ML
0.3 INJECTION SUBCUTANEOUS ONCE AS NEEDED
Status: CANCELLED | OUTPATIENT
Start: 2024-08-12

## 2024-08-12 RX ORDER — SODIUM CHLORIDE 0.9 % (FLUSH) 0.9 %
10 SYRINGE (ML) INJECTION
Status: CANCELLED | OUTPATIENT
Start: 2024-08-14

## 2024-08-12 RX ORDER — HEPARIN 100 UNIT/ML
500 SYRINGE INTRAVENOUS
Status: CANCELLED | OUTPATIENT
Start: 2024-08-12

## 2024-08-12 RX ORDER — SODIUM CHLORIDE 0.9 % (FLUSH) 0.9 %
10 SYRINGE (ML) INJECTION
Status: CANCELLED | OUTPATIENT
Start: 2024-08-13

## 2024-08-12 RX ORDER — DIPHENHYDRAMINE HYDROCHLORIDE 50 MG/ML
50 INJECTION INTRAMUSCULAR; INTRAVENOUS ONCE AS NEEDED
Status: CANCELLED | OUTPATIENT
Start: 2024-08-13

## 2024-08-12 RX ORDER — PROCHLORPERAZINE EDISYLATE 5 MG/ML
10 INJECTION INTRAMUSCULAR; INTRAVENOUS ONCE AS NEEDED
Status: CANCELLED | OUTPATIENT
Start: 2024-08-12

## 2024-08-12 RX ORDER — DIPHENHYDRAMINE HYDROCHLORIDE 50 MG/ML
50 INJECTION INTRAMUSCULAR; INTRAVENOUS ONCE AS NEEDED
Status: CANCELLED | OUTPATIENT
Start: 2024-08-12

## 2024-08-12 RX ORDER — EPINEPHRINE 0.3 MG/.3ML
0.3 INJECTION SUBCUTANEOUS ONCE AS NEEDED
Status: CANCELLED | OUTPATIENT
Start: 2024-08-14

## 2024-08-12 RX ORDER — DIPHENHYDRAMINE HYDROCHLORIDE 50 MG/ML
50 INJECTION INTRAMUSCULAR; INTRAVENOUS ONCE AS NEEDED
Status: CANCELLED | OUTPATIENT
Start: 2024-08-14

## 2024-08-12 RX ORDER — HEPARIN 100 UNIT/ML
500 SYRINGE INTRAVENOUS
Status: CANCELLED | OUTPATIENT
Start: 2024-08-14

## 2024-08-12 NOTE — PROGRESS NOTES
Referring provider:  Dr. Campbell    Reason for consultation:  Small-cell lung cancer      Diagnosis:  Small-cell lung cancer, extensive stage.      Current treatment:    05/11/2024-current:  Carbo etoposide atezolizumab, atezolizumab was omitted on 1st cycle due to being inpatient.      Treatment history:      HPI:    Patient was a 54-year-old male with history of alcohol use presented to the hospital at [facility] with symptoms of left neck swelling and change in speech.  In the ER he would soft tissue neck CT done which revealed generalized edema throughout the soft tissue as well as right jugular vein engorgement and a large lung mass in the right lung apex concerning for malignancy.  Patient was started on Lovenox 1 milligram/kg b.i.d. 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  A CT of the abdomen and pelvis on 05/06/2024 revealed questionable metastases to the liver.  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  Patient received cycle 1 of carbo etoposide on 05/11/2024 while inpatient at [facility].  He was referred to our clinic for outpatient care and further treatment management.  He presented to clinic on 05/24/2024 to establish care.    Patient reports history of smoking, half pack per day, 6 pack of beer daily, prior history of recreational drug use.  Last use 5 years back.  He lives with a friend.  He works offshore on oil field.  Denies family history of malignancy.    He was hospitalized at [facility] in July 2024 for JESUS after cycle 3 of chemotherapy thought to be secondary to nephrotoxic ATN versus acute interstitial nephritis and underwent a kidney biopsy.  Currently he was on hemodialysis 3 times weekly with Nephrology.      Interval history:    Today, 08/12/2024, patient denies any acute  concerns.  He continues with hemodialysis on Tues/Thurs/Sat per Nephrology. Today, patient denies any new symptoms of pain, decreased appetite or weight loss.  He denies any headaches or vision changes.  He reports tolerating radiation therapy fairly well currently.    Pathology       05/10/2024 for our pulmonary lymph node biopsy small-cell carcinoma, grade 3 neuroendocrine carcinoma with extensive necrosis.  Lymph node pulmonary 7 L EBUS biopsy small-cell carcinoma, with extensive necrosis.      Imaging     06/10/24 NM Bone scan: There are multiple areas of increased uptake within the thoracic and lumbar region possible related to arthritis, however, a metastatic disease must be considered.     05/07/2024 MRI brain with and without contrast:  No obvious metastatic disease seen however large areas of cerebral convexity specifically on the right side and to a lesser extent on the left side are not well evaluated on this examination due to artifact.  05/06/24 CT Chest w/ contrast: Large mass in right lung apex right hilum and mediastinum measuring 11 cm X 9 cm X 7.2 cm causing obstruction of the superior vena cava at the junction of the brachiocephalic veins.   05/06/24 CT soft tissue neck w/ contrast- BINTA  05/06/24 CT Head/Brain w/ contrast: - BINTA  05/06/2024 CT abdomen pelvis with IV contrast:  Nonspecific hypodense lesion is identified in the liver may represent metastatic disease.  Other secondary findings as noted above.  The liver mass is 9 mm in size.      Laboratory   08/12/24: cr 5.16, alb 3.5, ca 10.4, mag 2.5, phosphorus 4.3, TSH 0.968, cortisol 14.9, wbc 6.20, hgb 8.5, plt 169, ANC 5.07  07/16/24: cr 3.52, alb 2.7, ca 8.9, LFTs WNL, mag 2.0, phosphorus 3.4, cortisol 19.8, tsh 1.34, wbc 5.23, hgb 9.8, plt 195, ANC 3.86  06/24/24: cr 0.7, alb 3.3, ca 9.4, LFTs WNL, TSH 1.83, mag 2.0, phosphorus 4.1,  wbc 5.3, hgb 13.4, plt 469, cortisol pending   06/02/2024:  Creatinine 0 point 7, LFTs unremarkable, TSH  0.98, WBC count 14.6, hemoglobin 14, MCV 95.4, platelet count 267, cortisol pending.      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       Past Surgical History:   Procedure Laterality Date    CATARACT EXTRACTION W/  INTRAOCULAR LENS IMPLANT Left 06/28/2023    Dr Fritz Cornejo    ENDOBRONCHIAL ULTRASOUND N/A 05/08/2024    Dr Kayden Smart    HERNIA REPAIR  1993    INSERTION OF TUNNELED CENTRAL VENOUS HEMODIALYSIS CATHETER N/A 7/29/2024    Procedure: Insertion, Catheter, Central Venous, Hemodialysis;  Surgeon: Abel Goldstein DO;  Location: [facility] CATH LAB;  Service: Nephrology;  Laterality: N/A;       Family History   Problem Relation Name Age of Onset    No Known Problems Mother      No Known Problems Father         Social History     Socioeconomic History    Marital status: Single   Tobacco Use    Smoking status: Former     Current packs/day: 0.25     Types: Cigarettes    Smokeless tobacco: Former   Substance and Sexual Activity    Alcohol use: Not Currently     Comment: 1/2 pint of whiskey daily    Drug use: Not Currently       Current Outpatient Medications   Medication Sig Dispense Refill    albuterol (PROVENTIL/VENTOLIN HFA) 90 mcg/actuation inhaler Inhale 2 puffs into the lungs every 6 (six) hours as needed for Shortness of Breath.      allopurinoL (ZYLOPRIM) 300 MG tablet Take 1 tablet (300 mg total) by mouth once daily. 30 tablet 2    apixaban (ELIQUIS) 5 mg Tab Take 1 tablet (5 mg total) by mouth 2 (two) times daily. 60 tablet 5    b complex vitamins tablet Take 1 tablet by mouth 3 (three) times daily.       dexAMETHasone (DECADRON) 4 MG Tab Take 2 tablets (8 mg total) by mouth once daily. on days 2-4 of each chemotherapy cycle 6 tablet 3    diphenoxylate-atropine 2.5-0.025 mg (LOMOTIL) 2.5-0.025 mg per tablet Take 1 tablet by mouth 4 (four) times daily as needed for Diarrhea. 30 tablet 4    folic acid (FOLVITE) 1 MG tablet Take 1 tablet (1 mg total) by mouth once daily. 30 tablet 2    OLANZapine (ZYPREXA) 5 MG tablet Take 1 tablet by mouth nightly on days 1-4 of chemotherapy treatment.  Will only take for cycles 1-4. 16 tablet 0    pantoprazole (PROTONIX) 40 MG tablet Take 1 tablet (40 mg total) by mouth before breakfast. 30 tablet 2    prochlorperazine (COMPAZINE) 5 MG tablet Take 1 tablet (5 mg total) by mouth every 6 (six) hours as needed for Nausea. 30 tablet 3     No current facility-administered medications for this visit.       Review of patient's allergies indicates:  No Known Allergies      Review of systems  CONSTITUTIONAL: no fevers, no chills, no weight loss, no fatigue, no weakness  HEMATOLOGIC: no abnormal bleeding, no abnormal bruising, no drenching night sweats  ONCOLOGIC: no new masses or lumps  HEENT: no vision loss, no tinnitus or hearing loss, no nose bleeding, no dysphagia, no odynophagia  CVS: no chest pain, no palpitations, no dyspnea on exertion  RESP: no shortness of breath, no hemoptysis, no cough  GI: no nausea, no vomiting, no diarrhea, no constipation, no melena, no hematochezia, no hematemesis, no abdominal pain, no increase in abdominal girth  : no dysuria, no hematuria, no hesitancy, no scrotal swelling, no discharge  INTEGUMENT: no rashes, no abnormal bruising, no nail pitting, no hyperpigmentation  NEURO: no falls, no memory loss, no paresthesias or dysesthesias, no urofecal incontinence or retention, no loss of strength on any extremity  MSK: no back pain, no new joint pain, no joint swelling  PSYCH: no suicidal or homicidal ideation, no depression, no insomnia, no  anhedonia  ENDOCRINE: no heat or cold intolerance, no polyuria, no polydipsia      Physical Exam:  Vitals:    08/12/24 1021   BP: 121/86   Pulse: 109   Resp: 18   Temp: 97.8 °F (36.6 °C)         GA: AAOx3, NAD  HEENT: NCAT, moist oral mucous membranes, extensive engorgement of neck veins.  Hoarse voice.  CVS: s1s2 RRR, no M/R/G  RESP:  RUL reduced air entry. TTP over right chest wall.   ABD: soft, NT, ND, BS+, no hepatosplenomegaly  EXT: no deformities, no pedal edema  SKIN: no rashes, warm and dry  NEURO: normal mentation, strength 5/5 on all 4 extremities, no sensory deficits        Assessment    # Small-cell lung cancer, probable extensive stage possible liver metastases   Reviewed pathology report and imaging studies   Discussed with patient the diagnosis of small-cell lung cancer, the aggressive nature of disease, the questionable liver involvement and likely advanced incurable stage with palliative nature of his treatment  Patient was status post 1st cycle of carbo etoposide while inpatient, cycle 1 day 1 on 05/11/2024  He was referred for port placement however surgery deemed it appropriate for a PICC line given the concern for SVC syndrome and SVC thrombus.    Patient was agreeable to proceed with palliative systemic chemotherapy with carbo etoposide atezolizumab  Patient had a hospital admission with JESUS in July 2024 and underwent a kidney biopsy following which he was transitioned to hemodialysis with Nephrology.  Kidney biopsy report is pending.    Given that he was currently on hemodialysis, discussed the plan for dose reductions chemotherapy and the plan for chemotherapy cycle 4 day 1 to be done 12-24 hours before his dialysis.  Will discuss patient's chemotherapy dosing with pharmacy as well    Plan   Labs reviewed ok to resume C4D1 of Tecentriq/Carbo/Etoposide, dose reductions with etoposide 50% and carboplatin 5 x 25, to be given 12-24 hours before dialysis.    Will plan to repeat PET-CT prior to  transitioning into atezolizumab.  Ordered today  Plan to follow-up in clinic prior to proceeding with q 3 week Tecentriq with labs prior

## 2024-09-03 ENCOUNTER — OFFICE VISIT (OUTPATIENT)
Dept: HEMATOLOGY/ONCOLOGY | Facility: CLINIC | Age: 54
End: 2024-09-03
Payer: MEDICAID

## 2024-09-03 VITALS
DIASTOLIC BLOOD PRESSURE: 98 MMHG | HEIGHT: 66 IN | TEMPERATURE: 98 F | OXYGEN SATURATION: 99 % | BODY MASS INDEX: 18.99 KG/M2 | RESPIRATION RATE: 14 BRPM | SYSTOLIC BLOOD PRESSURE: 136 MMHG | HEART RATE: 103 BPM | WEIGHT: 118.19 LBS

## 2024-09-03 DIAGNOSIS — I87.1 SUPERIOR VENA CAVA SYNDROME: ICD-10-CM

## 2024-09-03 DIAGNOSIS — C34.81 SMALL CELL LUNG CANCER, OVERLAPPING SITES OF RIGHT LUNG: Primary | ICD-10-CM

## 2024-09-03 DIAGNOSIS — Z51.11 ENCOUNTER FOR ANTINEOPLASTIC CHEMOTHERAPY: ICD-10-CM

## 2024-09-03 DIAGNOSIS — Z79.01 CURRENT USE OF LONG TERM ANTICOAGULATION: ICD-10-CM

## 2024-09-03 PROCEDURE — 3008F BODY MASS INDEX DOCD: CPT | Mod: CPTII,,, | Performed by: STUDENT IN AN ORGANIZED HEALTH CARE EDUCATION/TRAINING PROGRAM

## 2024-09-03 PROCEDURE — 1159F MED LIST DOCD IN RCRD: CPT | Mod: CPTII,,, | Performed by: STUDENT IN AN ORGANIZED HEALTH CARE EDUCATION/TRAINING PROGRAM

## 2024-09-03 PROCEDURE — 99215 OFFICE O/P EST HI 40 MIN: CPT | Mod: ,,, | Performed by: STUDENT IN AN ORGANIZED HEALTH CARE EDUCATION/TRAINING PROGRAM

## 2024-09-03 PROCEDURE — 3080F DIAST BP >= 90 MM HG: CPT | Mod: CPTII,,, | Performed by: STUDENT IN AN ORGANIZED HEALTH CARE EDUCATION/TRAINING PROGRAM

## 2024-09-03 PROCEDURE — 3075F SYST BP GE 130 - 139MM HG: CPT | Mod: CPTII,,, | Performed by: STUDENT IN AN ORGANIZED HEALTH CARE EDUCATION/TRAINING PROGRAM

## 2024-09-03 RX ORDER — SODIUM CHLORIDE 0.9 % (FLUSH) 0.9 %
10 SYRINGE (ML) INJECTION
Status: CANCELLED | OUTPATIENT
Start: 2024-09-03

## 2024-09-03 RX ORDER — HEPARIN 100 UNIT/ML
500 SYRINGE INTRAVENOUS
Status: CANCELLED | OUTPATIENT
Start: 2024-09-03

## 2024-09-03 RX ORDER — DIPHENHYDRAMINE HYDROCHLORIDE 50 MG/ML
50 INJECTION INTRAMUSCULAR; INTRAVENOUS ONCE AS NEEDED
Status: CANCELLED | OUTPATIENT
Start: 2024-09-03

## 2024-09-03 RX ORDER — PROCHLORPERAZINE EDISYLATE 5 MG/ML
10 INJECTION INTRAMUSCULAR; INTRAVENOUS ONCE AS NEEDED
Status: CANCELLED | OUTPATIENT
Start: 2024-09-03

## 2024-09-03 RX ORDER — EPINEPHRINE 0.3 MG/.3ML
0.3 INJECTION SUBCUTANEOUS ONCE AS NEEDED
Status: CANCELLED | OUTPATIENT
Start: 2024-09-03

## 2024-09-03 NOTE — PROGRESS NOTES
Referring provider:  Dr. Campbell    Reason for consultation:  Small-cell lung cancer      Diagnosis:  Small-cell lung cancer, extensive stage.      Current treatment:      09/03/2024-current:  Atezolizumab    Treatment history:    05/11/2024-08/12/2024:  Carbo etoposide atezolizumab, atezolizumab was omitted on 1st cycle due to being inpatient.  Dates unknown:  Concurrent radiation therapy     HPI:    Patient was a 54-year-old male with history of alcohol use presented to the [facility] with symptoms of left neck swelling and change in speech.  In the ER he would soft tissue neck CT done which revealed generalized edema throughout the soft tissue as well as right jugular vein engorgement and a large lung mass in the right lung apex concerning for malignancy.  Patient was started on Lovenox 1 milligram/kg b.i.d. 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  A CT of the abdomen and pelvis on 05/06/2024 revealed questionable metastases to the liver.  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  Patient received cycle 1 of carbo etoposide on 05/11/2024 while inpatient at [facility].  He was referred to our clinic for outpatient care and further treatment management.  He presented to clinic on 05/24/2024 to establish care.    Patient reports history of smoking, half pack per day, 6 pack of beer daily, prior history of recreational drug use.  Last use 5 years back.  He lives with a friend.  He works offshore on oil field.  Denies family history of malignancy.    He was hospitalized at [facility] in July 2024 for JESUS after cycle 3 of chemotherapy thought to be secondary to nephrotoxic ATN versus acute interstitial nephritis and underwent a kidney biopsy.  he was on hemodialysis 3 times weekly with Nephrology  which was stopped in late August 2024      Interval history:    Today, 09/03/2024, patient denies any acute concerns today.  He reports that he was off dialysis per recommendations from Nephrology.  He reports tolerating his last cycle of treatment fairly well without any significant side effects.  He denies any headaches, vision changes or focal weakness.  He denies any symptoms of shortness of breath, chest pain or chest tightness.  He denies any fevers, chills, nausea, vomiting, ER or hospital visits since last follow-up visit with us      Pathology       05/10/2024 for our pulmonary lymph node biopsy small-cell carcinoma, grade 3 neuroendocrine carcinoma with extensive necrosis.  Lymph node pulmonary 7 L EBUS biopsy small-cell carcinoma, with extensive necrosis.      Imaging       08/26/2024 PET-CT:  Cavitary masslike opacity in the periphery of the right upper lobe with moderate uptake in keeping with the patient was known cancer.  Patchy ground-glass and reticular densities in the right upper lobe with mild uptake likely represents postradiation pneumonitis.  However continued monitoring is recommended.  Nonspecific mild uptake in the small precarinal lymph node that she will be monitored.  Multiple osteoblastic lesions without abnormal uptake, possibly representing treated metastases.  The cavitary lesion measures 3.8 x 3 cm with an SUV of 4.7.        06/10/24 NM Bone scan: There are multiple areas of increased uptake within the thoracic and lumbar region possible related to arthritis, however, a metastatic disease must be considered.     05/07/2024 MRI brain with and without contrast:  No obvious metastatic disease seen however large areas of cerebral convexity specifically on the right side and to a lesser extent on the left side are not well evaluated on this examination due to artifact.    05/06/24 CT Chest w/ contrast: Large mass in right lung apex right hilum and mediastinum measuring 11 cm X 9 cm X  7.2 cm causing obstruction of the superior vena cava at the junction of the brachiocephalic veins.     05/06/24 CT soft tissue neck w/ contrast- BINTA  05/06/24 CT Head/Brain w/ contrast: - BINTA  05/06/2024 CT abdomen pelvis with IV contrast:  Nonspecific hypodense lesion is identified in the liver may represent metastatic disease.  Other secondary findings as noted above.  The liver mass is 9 mm in size.      Laboratory   09/02/2024 creatinine 1.5, LFTs unremarkable, WBC count 3.2, hemoglobin 8.8, MCV 97.5, platelet count 221.  08/12/24: cr 5.16, alb 3.5, ca 10.4, mag 2.5, phosphorus 4.3, TSH 0.968, cortisol 14.9, wbc 6.20, hgb 8.5, plt 169, ANC 5.07  07/16/24: cr 3.52, alb 2.7, ca 8.9, LFTs WNL, mag 2.0, phosphorus 3.4, cortisol 19.8, tsh 1.34, wbc 5.23, hgb 9.8, plt 195, ANC 3.86  06/24/24: cr 0.7, alb 3.3, ca 9.4, LFTs WNL, TSH 1.83, mag 2.0, phosphorus 4.1,  wbc 5.3, hgb 13.4, plt 469, cortisol pending   06/02/2024:  Creatinine 0 point 7, LFTs unremarkable, TSH 0.98, WBC count 14.6, hemoglobin 14, MCV 95.4, platelet count 267, cortisol pending.      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       Past Surgical History:   Procedure Laterality Date    CATARACT EXTRACTION W/  INTRAOCULAR LENS IMPLANT Left 06/28/2023    Dr Fritz Cornejo    ENDOBRONCHIAL ULTRASOUND N/A 05/08/2024    Dr Kayden Smart    HERNIA REPAIR  1993    INSERTION OF TUNNELED CENTRAL VENOUS HEMODIALYSIS CATHETER N/A 7/29/2024    Procedure: Insertion, Catheter, Central Venous, Hemodialysis;  Surgeon: Abel Goldstein DO;  Location: [facility] CATH  LAB;  Service: Nephrology;  Laterality: N/A;       Family History   Problem Relation Name Age of Onset    No Known Problems Mother      No Known Problems Father         Social History     Socioeconomic History    Marital status: Single   Tobacco Use    Smoking status: Former     Current packs/day: 0.25     Types: Cigarettes    Smokeless tobacco: Former   Substance and Sexual Activity    Alcohol use: Not Currently     Comment: 1/2 pint of romain daily    Drug use: Not Currently       Current Outpatient Medications   Medication Sig Dispense Refill    albuterol (PROVENTIL/VENTOLIN HFA) 90 mcg/actuation inhaler Inhale 2 puffs into the lungs every 6 (six) hours as needed for Shortness of Breath.      apixaban (ELIQUIS) 5 mg Tab Take 1 tablet (5 mg total) by mouth 2 (two) times daily. 60 tablet 5    b complex vitamins tablet Take 1 tablet by mouth 3 (three) times daily.      dexAMETHasone (DECADRON) 4 MG Tab Take 2 tablets (8 mg total) by mouth once daily. on days 2-4 of each chemotherapy cycle 6 tablet 3    diphenoxylate-atropine 2.5-0.025 mg (LOMOTIL) 2.5-0.025 mg per tablet Take 1 tablet by mouth 4 (four) times daily as needed for Diarrhea. 30 tablet 4    OLANZapine (ZYPREXA) 5 MG tablet Take 1 tablet by mouth nightly on days 1-4 of chemotherapy treatment.  Will only take for cycles 1-4. 16 tablet 0    prochlorperazine (COMPAZINE) 5 MG tablet Take 1 tablet (5 mg total) by mouth every 6 (six) hours as needed for Nausea. 30 tablet 3    folic acid (FOLVITE) 1 MG tablet Take 1 tablet (1 mg total) by mouth once daily. 30 tablet 2    pantoprazole (PROTONIX) 40 MG tablet Take 1 tablet (40 mg total) by mouth before breakfast. 30 tablet 2     No current facility-administered medications for this visit.       Review of patient's allergies indicates:  No Known Allergies      Review of systems  CONSTITUTIONAL: no fevers, no chills, no weight loss, no fatigue, no weakness  HEMATOLOGIC: no abnormal bleeding, no abnormal bruising,  no drenching night sweats  ONCOLOGIC: no new masses or lumps  HEENT: no vision loss, no tinnitus or hearing loss, no nose bleeding, no dysphagia, no odynophagia  CVS: no chest pain, no palpitations, no dyspnea on exertion  RESP: no shortness of breath, no hemoptysis, no cough  GI: no nausea, no vomiting, no diarrhea, no constipation, no melena, no hematochezia, no hematemesis, no abdominal pain, no increase in abdominal girth  : no dysuria, no hematuria, no hesitancy, no scrotal swelling, no discharge  INTEGUMENT: no rashes, no abnormal bruising, no nail pitting, no hyperpigmentation  NEURO: no falls, no memory loss, no paresthesias or dysesthesias, no urofecal incontinence or retention, no loss of strength on any extremity  MSK: no back pain, no new joint pain, no joint swelling  PSYCH: no suicidal or homicidal ideation, no depression, no insomnia, no anhedonia  ENDOCRINE: no heat or cold intolerance, no polyuria, no polydipsia      Physical Exam:  Vitals:    09/03/24 0934   BP: (!) 136/98   Pulse: 103   Resp: 14   Temp: 98.2 °F (36.8 °C)         GA: AAOx3, NAD  HEENT: NCAT, moist oral mucous membranes,  CVS: s1s2 RRR, no M/R/G, PICC line in left upper extremity without surrounding edema or erythema  RESP:  RUL reduced air entry.  EXT: no deformities, no pedal edema  SKIN: no rashes, warm and dry  NEURO: normal mentation, strength 5/5 on all 4 extremities, no sensory deficits        Assessment    # Small-cell lung cancer, probable extensive stage possible liver metastases   Reviewed pathology report and imaging studies   Discussed with patient the diagnosis of small-cell lung cancer, the aggressive nature of disease, the questionable liver involvement and likely advanced incurable stage with palliative nature of his treatment  Patient was status post 1st cycle of carbo etoposide while inpatient, cycle 1 day 1 on 05/11/2024  He was referred for port placement however surgery deemed it appropriate for a PICC line  given the concern for SVC syndrome and SVC thrombus.    Patient was agreeable to proceed with palliative systemic chemotherapy with carbo etoposide atezolizumab  Patient had a hospital admission with JESUS in July 2024 and underwent a kidney biopsy following which he was transitioned to hemodialysis with Nephrology  Given that he was currently on hemodialysis, discussed the plan for dose reductions chemotherapy and the plan for chemotherapy cycle 4 day 1 to be done 12-24 hours before his dialysis.  Will discuss patient's chemotherapy dosing with pharmacy as well  Patient reports being off dialysis since late August 2024 as directed by Nephrology   PET-CT in August 2024 with positive response to therapy  Patient transitioned to maintenance atezolizumab on 09/03/2024          Plan   Reviewed labs, proceed with atezolizumab today   Plan to follow-up in 3 weeks, labs the day prior to next cycle of treatment.  Continue Eliquis 5 BID  Follow-up with Nephrology as scheduled.      Portions of the record may have been created with voice recognition software. Occasional wrong-word or sound-a-like substitutions may have occurred due to the inherent limitations of voice recognition software.

## 2024-09-13 ENCOUNTER — TELEPHONE (OUTPATIENT)
Dept: CARDIOLOGY | Facility: HOSPITAL | Age: 54
End: 2024-09-13
Payer: MEDICAID

## 2024-09-13 NOTE — TELEPHONE ENCOUNTER
HD center called requesting catheter removal appointment for patient. He is no longer on hemodialysis. His last treatment was two weeks ago (8/27/24). His most recent clearance is 1.5. Center will fax over recent lab work. Will save in media. Spoke with patient and scheduled him for Monday. Instructions given. -ZT

## 2024-09-16 ENCOUNTER — HOSPITAL ENCOUNTER (OUTPATIENT)
Facility: HOSPITAL | Age: 54
Discharge: HOME | End: 2024-09-16
Attending: STUDENT IN AN ORGANIZED HEALTH CARE EDUCATION/TRAINING PROGRAM | Admitting: STUDENT IN AN ORGANIZED HEALTH CARE EDUCATION/TRAINING PROGRAM
Payer: MEDICAID

## 2024-09-16 DIAGNOSIS — N17.9 AKI (ACUTE KIDNEY INJURY): ICD-10-CM

## 2024-09-16 PROCEDURE — 36589 REMOVAL TUNNELED CV CATH: CPT | Mod: LT | Performed by: STUDENT IN AN ORGANIZED HEALTH CARE EDUCATION/TRAINING PROGRAM

## 2024-09-16 PROCEDURE — 99213 OFFICE O/P EST LOW 20 MIN: CPT | Mod: 25,,, | Performed by: STUDENT IN AN ORGANIZED HEALTH CARE EDUCATION/TRAINING PROGRAM

## 2024-09-16 PROCEDURE — 25000003 PHARM REV CODE 250: Performed by: STUDENT IN AN ORGANIZED HEALTH CARE EDUCATION/TRAINING PROGRAM

## 2024-09-16 PROCEDURE — 36589 REMOVAL TUNNELED CV CATH: CPT | Mod: ,,, | Performed by: STUDENT IN AN ORGANIZED HEALTH CARE EDUCATION/TRAINING PROGRAM

## 2024-09-16 RX ORDER — LIDOCAINE HYDROCHLORIDE 10 MG/ML
INJECTION, SOLUTION INFILTRATION; PERINEURAL
Status: DISCONTINUED | OUTPATIENT
Start: 2024-09-16 | End: 2024-09-16 | Stop reason: HOSPADM

## 2024-09-16 RX ORDER — LIDOCAINE HYDROCHLORIDE 10 MG/ML
10 INJECTION, SOLUTION INFILTRATION; PERINEURAL ONCE
Status: DISCONTINUED | OUTPATIENT
Start: 2024-09-16 | End: 2024-09-16 | Stop reason: HOSPADM

## 2024-09-16 NOTE — H&P
INTERVENTIONAL NEPHROLOGY HISTORY & PHYSICAL       Patient Name: Adán Amayaharika AARON 1970    Date: 2024  Time: 3:09 PM         HPI: 54 y.o. male with resolved JESUS no longer requiring HD via left internal jugular (LIJ)/left chest wall (LCW) tunneled dialysis catheter (TDC) who presents with need for catheter removal.    [facility] Family is not present. Risks and benefits of tunneled dialysis catheter removal was reviewed with the patient. The patient agrees to proceed with the intended procedure.     Review of Systems:  General:  No fatigue  Skin: No rashes  HEENT: No vision changes  CVS: No CP  RS: No SOB  GIT: No abdominal pain  Extremities: No swelling  Neurological:  No focal weakness  Psych: No depression    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      Past Surgical History:   Procedure Laterality Date    CATARACT EXTRACTION W/  INTRAOCULAR LENS IMPLANT Left 2023    Dr Fritz Cornejo    ENDOBRONCHIAL ULTRASOUND N/A 2024    Dr Kayden Smart    HERNIA REPAIR      INSERTION OF TUNNELED CENTRAL VENOUS HEMODIALYSIS CATHETER N/A 2024    Procedure: Insertion, Catheter, Central Venous, Hemodialysis;  Surgeon: Abel Goldstein DO;  Location: [facility] CATH LAB;  Service: Nephrology;  Laterality: N/A;      Review of patient's allergies indicates:  No Known Allergies   Social History     Tobacco Use    Smoking status: Former     Current packs/day: 0.25     Types: Cigarettes    Smokeless tobacco: Former   Substance Use Topics    Alcohol  use: Not Currently     Comment: 1/2 pint of whiskey daily    Drug use: Not Currently      Family History   Problem Relation Name Age of Onset    No Known Problems Mother      No Known Problems Father           Current Facility-Administered Medications:     LIDOcaine HCL 10 mg/ml (1%) injection 10 mL, 10 mL, Intradermal, Once, Hasan, Abel, DO    Vital Signs:  Temp:  [97.9 °F (36.6 °C)] 97.9 °F (36.6 °C)  Pulse:  [104] 104  SpO2:  [99 %] 99 %  BP: (116)/(80) 116/80     Physical Exam:  General: NAD  HEENT: NC/AT, EOMI  CVS: RRR.  RS: breathing easily.  Abdominal: Soft, NT/ND.  Extremities: No edema b/l LE  Skin: No rash, no lesions.  Neurological: No focal deficits.  Psych: Normal affect  Dialysis Access: left internal jugular (LIJ)/left chest wall (LCW) tunneled dialysis catheter (TDC) without signs of bleeding/infection.    Results:    Lab Results   Component Value Date     07/29/2024     06/12/2023    K 4.4 07/29/2024    K 4.3 06/12/2023     07/29/2024     06/12/2023    CO2 24 07/29/2024    CO2 28 06/12/2023    BUN 53.7 (H) 07/29/2024    BUN 10 06/12/2023    CREATININE 11.99 (H) 07/29/2024    CREATININE 0.8 06/12/2023     06/12/2023     Lab Results   Component Value Date    WBC 4.98 05/15/2024    WBC 12.64 05/11/2024    WBC 7.26 06/12/2023    HGB 12.4 (L) 05/15/2024    HGB 15.4 06/12/2023     05/15/2024     06/12/2023    MCV 95.9 (H) 05/15/2024    MCV 94 06/12/2023       Assessment and Plan:      Resolved JESUS no longer on HD via left internal jugular (LIJ)/left chest wall (LCW) tunneled dialysis catheter (TDC).  Pt with resolved JESUS no long on HD via left internal jugular (LIJ)/left chest wall (LCW) tunneled dialysis catheter (TDC) who presents today with catheter removal today.  - Consents obtained and placed within chart.  - Will proceed in CVSS setting today.    Please feel free to reach me with any questions.    Abel Goldstein, DO  Interventional Nephrology  Cell:  [phone]

## 2024-09-16 NOTE — Clinical Note
Patient instructed to take deep breath and catheter was removed. Manual pressure held at exit site and venotomy site until hemostasis achieved.

## 2024-09-16 NOTE — PROCEDURES
INTERVENTIONAL NEPHROLOGY PROCEDURE NOTE:   TUNNELED DIALYSIS CATHETER (TDC) REMOVAL       Patient Name: Adán Oliviasyedawais  AGNIESZKA 1970    Procedure Date:    2024    Performing Physician:   Dr. Goldstein    Access History: Pt is with resolved JESUS no longer requiring HD.    Pre-Op Diagnosis: N17.9 Acute Kidney Injury (JESUS).  Post-Op Diagnosis: Same    Procedure: Tunneled dialysis catheter removal.    Indication: JESUS no longer requiring HD.    Anatomical Site: left internal jugular (LIJ)/left chest wall (LCW)    Informed Consent:  The patient was evaluated in the pre-operative area with assessment including the American Society of Anesthesia risk classification. The procedure is discussed in detail including risks, benefits alternatives and options and the patient agrees to proceed. Informed consent was obtained from the patient.     Maximum sterile barrier technique: The patient was prepped and draped using chlorhexidine prep and maximum sterile barrier technique.    Procedure Steps:   The original dressing overlying the catheter was removed. The patient was prepped and draped in sterile fashion. The catheter was gently pulled to assess if the catheter cuff was already released. It was found to be stationed well within the tunnel. There were no obvious signs of infection.    The suture anchoring the catheter hub to the skin was removed. The area, specifically the exit site and tunnel of the catheter was infiltrated with 1% lidocaine. Moments later, blunt dissection with mosquito-tipped hemostats was completed to free the internalized tunneled catheter cuff. Once found to be freely moveable, the catheter was removed in a smooth motion. Pressure was held at the suspected venotomy site for ten minutes and hemostasis was found to be achieved. A folded 4 x 4 gauze was placed at the exit site, with a Tegaderm applied over to supply as a dressing.    ASSESSMENT/PLAN:  - Successful removal of left internal  jugular (LIJ)/left chest wall (LCW) tunneled dialysis catheter.    EBL: 2 ml    Complications: None    Thank you for allowing me the opportunity in taking care of this patient. Please reach me with any questions.    Abel Goldstein DO  Interventional Nephrology  Cell: [phone]

## 2024-09-16 NOTE — DISCHARGE SUMMARY
INTERVENTIONAL NEPHROLOGY DISCHARGE SUMMARY         Patient Name: Adán Dinero   1970    Procedure Date: 2024      In brief, Mr. Dinero underwent uneventful tunneled dialysis catheter removal today.    Pre-Op Diagnosis: N17.9 Acute Kidney Injury (JESUS).  Post-Op Diagnosis: Same.    Discharge Instructions/Recommendations:  - Pt may be discharged after successful monitoring in post-op area.  - Pt may resume home medications.    Thank you for allowing me the opportunity in taking care of this patient. Please reach me with any questions.    Abel Goldstein DO  Interventional Nephrology  Cell: [phone]

## 2024-09-18 VITALS
SYSTOLIC BLOOD PRESSURE: 116 MMHG | BODY MASS INDEX: 19.2 KG/M2 | HEIGHT: 66 IN | HEART RATE: 104 BPM | DIASTOLIC BLOOD PRESSURE: 80 MMHG | TEMPERATURE: 98 F | WEIGHT: 119.5 LBS | OXYGEN SATURATION: 99 %

## 2024-09-23 NOTE — PROGRESS NOTES
Referring provider:  Dr. Campbell    Reason for consultation:  Small-cell lung cancer      Diagnosis:  Small-cell lung cancer, extensive stage.      Current treatment:      09/03/2024-current:  Atezolizumab    Treatment history:    05/11/2024-08/12/2024:  Carbo etoposide atezolizumab, atezolizumab was omitted on 1st cycle due to being inpatient.  Dates unknown:  Concurrent radiation therapy     HPI:    Patient was a 54-year-old male with history of alcohol use presented to the [facility] with symptoms of left neck swelling and change in speech.  In the ER he would soft tissue neck CT done which revealed generalized edema throughout the soft tissue as well as right jugular vein engorgement and a large lung mass in the right lung apex concerning for malignancy.  Patient was started on Lovenox 1 milligram/kg b.i.d. 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  A CT of the abdomen and pelvis on 05/06/2024 revealed questionable metastases to the liver.  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  Patient received cycle 1 of carbo etoposide on 05/11/2024 while inpatient at [facility].  He was referred to our clinic for outpatient care and further treatment management.  He presented to clinic on 05/24/2024 to establish care.    Patient reports history of smoking, half pack per day, 6 pack of beer daily, prior history of recreational drug use.  Last use 5 years back.  He lives with a friend.  He works offshore on oil field.  Denies family history of malignancy.    He was hospitalized at [facility] in July 2024 for JESUS after cycle 3 of chemotherapy thought to be secondary to nephrotoxic ATN versus acute interstitial nephritis and underwent a kidney biopsy.  he was on hemodialysis 3 times weekly with Nephrology  which was stopped in late August 2024      Interval history:    Today, 09/24/2024, patient denies any acute concerns today.  He reports that he was off dialysis per recommendations from Nephrology.  He reports tolerating his last cycle of treatment fairly well without any significant side effects.  He denies any headaches, vision changes or focal weakness.  He denies any symptoms of shortness of breath, chest pain or chest tightness.  He denies any fevers, chills, nausea, vomiting, ER or hospital visits since last follow-up visit with us. Patient would like to have PICC discontinued since he can get tecentriq via peripheral vein. Will have infusion look at his veins. OK to d/c PICC from our standpoint.      Pathology       05/10/2024 for our pulmonary lymph node biopsy small-cell carcinoma, grade 3 neuroendocrine carcinoma with extensive necrosis.  Lymph node pulmonary 7 L EBUS biopsy small-cell carcinoma, with extensive necrosis.      Imaging       08/26/2024 PET-CT:  Cavitary masslike opacity in the periphery of the right upper lobe with moderate uptake in keeping with the patient was known cancer.  Patchy ground-glass and reticular densities in the right upper lobe with mild uptake likely represents postradiation pneumonitis.  However continued monitoring is recommended.  Nonspecific mild uptake in the small precarinal lymph node that she will be monitored.  Multiple osteoblastic lesions without abnormal uptake, possibly representing treated metastases.  The cavitary lesion measures 3.8 x 3 cm with an SUV of 4.7.        06/10/24 NM Bone scan: There are multiple areas of increased uptake within the thoracic and lumbar region possible related to arthritis, however, a metastatic disease must be considered.     05/07/2024 MRI brain with and without contrast:  No obvious metastatic disease seen however large areas of cerebral convexity specifically on the right side and to a lesser extent on the left side are not  well evaluated on this examination due to artifact.    05/06/24 CT Chest w/ contrast: Large mass in right lung apex right hilum and mediastinum measuring 11 cm X 9 cm X 7.2 cm causing obstruction of the superior vena cava at the junction of the brachiocephalic veins.     05/06/24 CT soft tissue neck w/ contrast- BINTA  05/06/24 CT Head/Brain w/ contrast: - BINTA  05/06/2024 CT abdomen pelvis with IV contrast:  Nonspecific hypodense lesion is identified in the liver may represent metastatic disease.  Other secondary findings as noted above.  The liver mass is 9 mm in size.      Laboratory   09/02/2024 creatinine 1.1, ALK phos 142, ALT 14, , T bili 0.3, WBC count 5.6, hemoglobin 12.0, .5, platelet count 272, ANC 3.86.  09/02/2024 creatinine 1.5, LFTs unremarkable, WBC count 3.2, hemoglobin 8.8, MCV 97.5, platelet count 221.  08/12/24: cr 5.16, alb 3.5, ca 10.4, mag 2.5, phosphorus 4.3, TSH 0.968, cortisol 14.9, wbc 6.20, hgb 8.5, plt 169, ANC 5.07  07/16/24: cr 3.52, alb 2.7, ca 8.9, LFTs WNL, mag 2.0, phosphorus 3.4, cortisol 19.8, tsh 1.34, wbc 5.23, hgb 9.8, plt 195, ANC 3.86  06/24/24: cr 0.7, alb 3.3, ca 9.4, LFTs WNL, TSH 1.83, mag 2.0, phosphorus 4.1,  wbc 5.3, hgb 13.4, plt 469, cortisol pending   06/02/2024:  Creatinine 0 point 7, LFTs unremarkable, TSH 0.98, WBC count 14.6, hemoglobin 14, MCV 95.4, platelet count 267, cortisol pending.      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       Past Surgical History:   Procedure Laterality Date    CATARACT EXTRACTION W/  INTRAOCULAR LENS IMPLANT Left  06/28/2023    Dr Fritz Cornejo    ENDOBRONCHIAL ULTRASOUND N/A 05/08/2024    Dr Kayden Smart    HERNIA REPAIR  1993    INSERTION OF TUNNELED CENTRAL VENOUS HEMODIALYSIS CATHETER N/A 7/29/2024    Procedure: Insertion, Catheter, Central Venous, Hemodialysis;  Surgeon: Abel Goldstein DO;  Location: [facility] CATH LAB;  Service: Nephrology;  Laterality: N/A;    REMOVAL OF TUNNELED CENTRAL VENOUS CATHETER (CVC) N/A 9/16/2024    Procedure: REMOVAL, CATHETER, CENTRAL VENOUS, TUNNELED;  Surgeon: Abel Goldstein DO;  Location: [facility] CATH LAB;  Service: Nephrology;  Laterality: N/A;       Family History   Problem Relation Name Age of Onset    No Known Problems Mother      No Known Problems Father         Social History     Socioeconomic History    Marital status: Single   Tobacco Use    Smoking status: Former     Current packs/day: 0.25     Types: Cigarettes    Smokeless tobacco: Former   Substance and Sexual Activity    Alcohol use: Not Currently     Comment: 1/2 pint of romain daily    Drug use: Not Currently       Current Outpatient Medications   Medication Sig Dispense Refill    albuterol (PROVENTIL/VENTOLIN HFA) 90 mcg/actuation inhaler Inhale 2 puffs into the lungs every 6 (six) hours as needed for Shortness of Breath.      apixaban (ELIQUIS) 5 mg Tab Take 1 tablet (5 mg total) by mouth 2 (two) times daily. 60 tablet 5    b complex vitamins tablet Take 1 tablet by mouth 3 (three) times daily.      dexAMETHasone (DECADRON) 4 MG Tab Take 2 tablets (8 mg total) by mouth once daily. on days 2-4 of each chemotherapy cycle 6 tablet 3    diphenoxylate-atropine 2.5-0.025 mg (LOMOTIL) 2.5-0.025 mg per tablet Take 1 tablet by mouth 4 (four) times daily as needed for Diarrhea. 30 tablet 4    OLANZapine (ZYPREXA) 5 MG tablet Take 1 tablet by mouth nightly on days 1-4 of chemotherapy treatment.  Will only take for cycles 1-4. 16 tablet 0    prochlorperazine (COMPAZINE) 5 MG tablet Take 1 tablet (5 mg total) by mouth every 6 (six)  hours as needed for Nausea. 30 tablet 3    folic acid (FOLVITE) 1 MG tablet Take 1 tablet (1 mg total) by mouth once daily. 30 tablet 2    pantoprazole (PROTONIX) 40 MG tablet Take 1 tablet (40 mg total) by mouth before breakfast. 30 tablet 2    potassium chloride SA (K-DUR,KLOR-CON) 20 MEQ tablet Take 1 tablet (20 mEq total) by mouth 2 (two) times daily. for 2 days 4 tablet 0     No current facility-administered medications for this visit.       Review of patient's allergies indicates:  No Known Allergies      Review of systems  CONSTITUTIONAL: no fevers, no chills, no weight loss, no fatigue, no weakness  HEMATOLOGIC: no abnormal bleeding, no abnormal bruising, no drenching night sweats  ONCOLOGIC: no new masses or lumps  HEENT: no vision loss, no tinnitus or hearing loss, no nose bleeding, no dysphagia, no odynophagia  CVS: no chest pain, no palpitations, no dyspnea on exertion  RESP: no shortness of breath, no hemoptysis, no cough  GI: no nausea, no vomiting, no diarrhea, no constipation, no melena, no hematochezia, no hematemesis, no abdominal pain, no increase in abdominal girth  : no dysuria, no hematuria, no hesitancy, no scrotal swelling, no discharge  INTEGUMENT: no rashes, no abnormal bruising, no nail pitting, no hyperpigmentation  NEURO: no falls, no memory loss, no paresthesias or dysesthesias, no urofecal incontinence or retention, no loss of strength on any extremity  MSK: no back pain, no new joint pain, no joint swelling  PSYCH: no suicidal or homicidal ideation, no depression, no insomnia, no anhedonia  ENDOCRINE: no heat or cold intolerance, no polyuria, no polydipsia      Physical Exam:  Vitals:    09/24/24 0958   BP: 120/85   Pulse: 74   Resp: 14   Temp: 97.7 °F (36.5 °C)           GA: AAOx3, NAD  HEENT: NCAT, moist oral mucous membranes,  CVS: s1s2 RRR, no M/R/G, PICC line in left upper extremity without surrounding edema or erythema  RESP:  RUL reduced air entry.  EXT: no deformities, no  pedal edema  SKIN: no rashes, warm and dry  NEURO: normal mentation, strength 5/5 on all 4 extremities, no sensory deficits        Assessment    # Small-cell lung cancer, probable extensive stage possible liver metastases   Reviewed pathology report and imaging studies   Discussed with patient the diagnosis of small-cell lung cancer, the aggressive nature of disease, the questionable liver involvement and likely advanced incurable stage with palliative nature of his treatment  Patient was status post 1st cycle of carbo etoposide while inpatient, cycle 1 day 1 on 05/11/2024  He was referred for port placement however surgery deemed it appropriate for a PICC line given the concern for SVC syndrome and SVC thrombus.    Patient was agreeable to proceed with palliative systemic chemotherapy with carbo etoposide atezolizumab  Patient had a hospital admission with JESUS in July 2024 and underwent a kidney biopsy following which he was transitioned to hemodialysis with Nephrology  Given that he was currently on hemodialysis, discussed the plan for dose reductions chemotherapy and the plan for chemotherapy cycle 4 day 1 to be done 12-24 hours before his dialysis.  Will discuss patient's chemotherapy dosing with pharmacy as well  Patient reports being off dialysis since late August 2024 as directed by Nephrology   PET-CT in August 2024 with positive response to therapy  Patient transitioned to maintenance atezolizumab on 09/03/2024          Plan   Reviewed labs, proceed with atezolizumab today   Plan to follow-up in 3 weeks, labs the day prior to next cycle of treatment.  Continue Eliquis 5 BID  20 meq KCL x 4 doses for hypokalemia  Follow-up with Nephrology as scheduled.    Rosita METZ-C

## 2024-09-24 ENCOUNTER — OFFICE VISIT (OUTPATIENT)
Dept: HEMATOLOGY/ONCOLOGY | Facility: CLINIC | Age: 54
End: 2024-09-24
Payer: MEDICAID

## 2024-09-24 VITALS
TEMPERATURE: 98 F | OXYGEN SATURATION: 100 % | DIASTOLIC BLOOD PRESSURE: 85 MMHG | SYSTOLIC BLOOD PRESSURE: 120 MMHG | BODY MASS INDEX: 19.01 KG/M2 | HEART RATE: 74 BPM | HEIGHT: 66 IN | RESPIRATION RATE: 14 BRPM | WEIGHT: 118.31 LBS

## 2024-09-24 DIAGNOSIS — G89.3 CANCER RELATED PAIN: ICD-10-CM

## 2024-09-24 DIAGNOSIS — I87.1 SUPERIOR VENA CAVA SYNDROME: ICD-10-CM

## 2024-09-24 DIAGNOSIS — Z51.11 ENCOUNTER FOR ANTINEOPLASTIC CHEMOTHERAPY: ICD-10-CM

## 2024-09-24 DIAGNOSIS — Z79.01 CURRENT USE OF LONG TERM ANTICOAGULATION: ICD-10-CM

## 2024-09-24 DIAGNOSIS — C34.81 MALIGNANT NEOPLASM OF OVERLAPPING SITES OF RIGHT LUNG: ICD-10-CM

## 2024-09-24 DIAGNOSIS — E87.6 HYPOKALEMIA: ICD-10-CM

## 2024-09-24 DIAGNOSIS — K76.9 LIVER LESION: ICD-10-CM

## 2024-09-24 DIAGNOSIS — C34.81 SMALL CELL LUNG CANCER, OVERLAPPING SITES OF RIGHT LUNG: Primary | ICD-10-CM

## 2024-09-24 PROCEDURE — 3074F SYST BP LT 130 MM HG: CPT | Mod: CPTII,,,

## 2024-09-24 PROCEDURE — 3079F DIAST BP 80-89 MM HG: CPT | Mod: CPTII,,,

## 2024-09-24 PROCEDURE — 3008F BODY MASS INDEX DOCD: CPT | Mod: CPTII,,,

## 2024-09-24 PROCEDURE — 1160F RVW MEDS BY RX/DR IN RCRD: CPT | Mod: CPTII,,,

## 2024-09-24 PROCEDURE — 1159F MED LIST DOCD IN RCRD: CPT | Mod: CPTII,,,

## 2024-09-24 PROCEDURE — 99215 OFFICE O/P EST HI 40 MIN: CPT | Mod: ,,,

## 2024-09-24 RX ORDER — POTASSIUM CHLORIDE 20 MEQ/1
20 TABLET, EXTENDED RELEASE ORAL 2 TIMES DAILY
Qty: 4 TABLET | Refills: 0 | Status: SHIPPED | OUTPATIENT
Start: 2024-09-24 | End: 2024-09-26

## 2024-09-24 RX ORDER — PROCHLORPERAZINE EDISYLATE 5 MG/ML
10 INJECTION INTRAMUSCULAR; INTRAVENOUS ONCE AS NEEDED
Status: CANCELLED | OUTPATIENT
Start: 2024-09-24

## 2024-09-24 RX ORDER — DIPHENHYDRAMINE HYDROCHLORIDE 50 MG/ML
50 INJECTION INTRAMUSCULAR; INTRAVENOUS ONCE AS NEEDED
Status: CANCELLED | OUTPATIENT
Start: 2024-09-24

## 2024-09-24 RX ORDER — HEPARIN 100 UNIT/ML
500 SYRINGE INTRAVENOUS
Status: CANCELLED | OUTPATIENT
Start: 2024-09-24

## 2024-09-24 RX ORDER — EPINEPHRINE 0.3 MG/.3ML
0.3 INJECTION SUBCUTANEOUS ONCE AS NEEDED
Status: CANCELLED | OUTPATIENT
Start: 2024-09-24

## 2024-09-24 RX ORDER — SODIUM CHLORIDE 0.9 % (FLUSH) 0.9 %
10 SYRINGE (ML) INJECTION
Status: CANCELLED | OUTPATIENT
Start: 2024-09-24

## 2024-10-15 ENCOUNTER — OFFICE VISIT (OUTPATIENT)
Dept: HEMATOLOGY/ONCOLOGY | Facility: CLINIC | Age: 54
End: 2024-10-15
Payer: MEDICAID

## 2024-10-15 VITALS
BODY MASS INDEX: 19.55 KG/M2 | HEART RATE: 107 BPM | TEMPERATURE: 98 F | OXYGEN SATURATION: 100 % | WEIGHT: 121.63 LBS | RESPIRATION RATE: 18 BRPM | SYSTOLIC BLOOD PRESSURE: 123 MMHG | DIASTOLIC BLOOD PRESSURE: 84 MMHG | HEIGHT: 66 IN

## 2024-10-15 DIAGNOSIS — C34.81 SMALL CELL LUNG CANCER, OVERLAPPING SITES OF RIGHT LUNG: Primary | ICD-10-CM

## 2024-10-15 PROCEDURE — 99215 OFFICE O/P EST HI 40 MIN: CPT | Mod: ,,,

## 2024-10-15 PROCEDURE — 1159F MED LIST DOCD IN RCRD: CPT | Mod: CPTII,,,

## 2024-10-15 PROCEDURE — 3008F BODY MASS INDEX DOCD: CPT | Mod: CPTII,,,

## 2024-10-15 PROCEDURE — 3079F DIAST BP 80-89 MM HG: CPT | Mod: CPTII,,,

## 2024-10-15 PROCEDURE — 3074F SYST BP LT 130 MM HG: CPT | Mod: CPTII,,,

## 2024-10-15 RX ORDER — SODIUM CHLORIDE 0.9 % (FLUSH) 0.9 %
10 SYRINGE (ML) INJECTION
Status: CANCELLED | OUTPATIENT
Start: 2024-10-15

## 2024-10-15 RX ORDER — HEPARIN 100 UNIT/ML
500 SYRINGE INTRAVENOUS
Status: CANCELLED | OUTPATIENT
Start: 2024-10-15

## 2024-10-15 RX ORDER — PROCHLORPERAZINE EDISYLATE 5 MG/ML
10 INJECTION INTRAMUSCULAR; INTRAVENOUS ONCE AS NEEDED
Status: CANCELLED | OUTPATIENT
Start: 2024-10-15

## 2024-10-15 RX ORDER — DIPHENHYDRAMINE HYDROCHLORIDE 50 MG/ML
50 INJECTION INTRAMUSCULAR; INTRAVENOUS ONCE AS NEEDED
Status: CANCELLED | OUTPATIENT
Start: 2024-10-15

## 2024-10-15 RX ORDER — EPINEPHRINE 0.3 MG/.3ML
0.3 INJECTION SUBCUTANEOUS ONCE AS NEEDED
Status: CANCELLED | OUTPATIENT
Start: 2024-10-15

## 2024-10-15 NOTE — PROGRESS NOTES
Referring provider:  Dr. Campbell    Reason for consultation:  Small-cell lung cancer      Diagnosis:  Small-cell lung cancer, extensive stage.      Current treatment:      09/03/2024-current:  Atezolizumab    Treatment history:    05/11/2024-08/12/2024:  Carbo etoposide atezolizumab, atezolizumab was omitted on 1st cycle due to being inpatient.  Dates unknown:  Concurrent radiation therapy     HPI:    Patient was a 54-year-old male with history of alcohol use presented to the [facility] with symptoms of left neck swelling and change in speech.  In the ER he would soft tissue neck CT done which revealed generalized edema throughout the soft tissue as well as right jugular vein engorgement and a large lung mass in the right lung apex concerning for malignancy.  Patient was started on Lovenox 1 milligram/kg b.i.d. 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  A CT of the abdomen and pelvis on 05/06/2024 revealed questionable metastases to the liver.  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  Patient received cycle 1 of carbo etoposide on 05/11/2024 while inpatient at [facility].  He was referred to our clinic for outpatient care and further treatment management.  He presented to clinic on 05/24/2024 to establish care.    Patient reports history of smoking, half pack per day, 6 pack of beer daily, prior history of recreational drug use.  Last use 5 years back.  He lives with a friend.  He works offshore on oil field.  Denies family history of malignancy.    He was hospitalized at [facility] in July 2024 for JESUS after cycle 3 of chemotherapy thought to be secondary to nephrotoxic ATN versus acute interstitial nephritis and underwent a kidney biopsy.  he was on hemodialysis 3 times weekly with Nephrology  which was stopped in late August 2024      Interval history:    Today, 10/15/2024, patient denies any acute concerns today.  He is off dialysis per recommendations from Nephrology.  He reports tolerating his last cycle of treatment fairly well without any significant side effects.  He denies any headaches, vision changes or focal weakness. He denies any fevers, chills, nausea, vomiting, ER or hospital visits since last follow-up visit with us.    Pathology       05/10/2024 for our pulmonary lymph node biopsy small-cell carcinoma, grade 3 neuroendocrine carcinoma with extensive necrosis.  Lymph node pulmonary 7 L EBUS biopsy small-cell carcinoma, with extensive necrosis.      Imaging       08/26/2024 PET-CT:  Cavitary masslike opacity in the periphery of the right upper lobe with moderate uptake in keeping with the patient was known cancer.  Patchy ground-glass and reticular densities in the right upper lobe with mild uptake likely represents postradiation pneumonitis.  However continued monitoring is recommended.  Nonspecific mild uptake in the small precarinal lymph node that she will be monitored.  Multiple osteoblastic lesions without abnormal uptake, possibly representing treated metastases.  The cavitary lesion measures 3.8 x 3 cm with an SUV of 4.7.        06/10/24 NM Bone scan: There are multiple areas of increased uptake within the thoracic and lumbar region possible related to arthritis, however, a metastatic disease must be considered.     05/07/2024 MRI brain with and without contrast:  No obvious metastatic disease seen however large areas of cerebral convexity specifically on the right side and to a lesser extent on the left side are not well evaluated on this examination due to artifact.    05/06/24 CT Chest w/ contrast: Large mass in right lung apex right hilum and mediastinum measuring 11 cm X 9 cm X 7.2 cm causing obstruction of the superior vena cava at the junction of the brachiocephalic veins.      05/06/24 CT soft tissue neck w/ contrast- BINTA  05/06/24 CT Head/Brain w/ contrast: - BINTA  05/06/2024 CT abdomen pelvis with IV contrast:  Nonspecific hypodense lesion is identified in the liver may represent metastatic disease.  Other secondary findings as noted above.  The liver mass is 9 mm in size.      Laboratory   10/14/24: cr 1.1, alb 3.6, ca 9.6, LFTs WNL, alkphos 147, wbc 6.7, hgb 13.8, plt 216, ANC 5.11, TSH 2.25  09/02/2024 creatinine 1.1, ALK phos 142, ALT 14, , T bili 0.3, WBC count 5.6, hemoglobin 12.0, .5, platelet count 272, ANC 3.86.  09/02/2024 creatinine 1.5, LFTs unremarkable, WBC count 3.2, hemoglobin 8.8, MCV 97.5, platelet count 221.  08/12/24: cr 5.16, alb 3.5, ca 10.4, mag 2.5, phosphorus 4.3, TSH 0.968, cortisol 14.9, wbc 6.20, hgb 8.5, plt 169, ANC 5.07  07/16/24: cr 3.52, alb 2.7, ca 8.9, LFTs WNL, mag 2.0, phosphorus 3.4, cortisol 19.8, tsh 1.34, wbc 5.23, hgb 9.8, plt 195, ANC 3.86  06/24/24: cr 0.7, alb 3.3, ca 9.4, LFTs WNL, TSH 1.83, mag 2.0, phosphorus 4.1,  wbc 5.3, hgb 13.4, plt 469, cortisol pending   06/02/2024:  Creatinine 0 point 7, LFTs unremarkable, TSH 0.98, WBC count 14.6, hemoglobin 14, MCV 95.4, platelet count 267, cortisol pending.      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       Past Surgical History:   Procedure Laterality Date    CATARACT EXTRACTION W/  INTRAOCULAR LENS IMPLANT Left 06/28/2023    Dr Fritz Cornejo    ENDOBRONCHIAL ULTRASOUND N/A 05/08/2024    Dr Kayden Smart    HERNIA REPAIR  1993    INSERTION OF TUNNELED CENTRAL VENOUS  HEMODIALYSIS CATHETER N/A 7/29/2024    Procedure: Insertion, Catheter, Central Venous, Hemodialysis;  Surgeon: Abel Goldstein DO;  Location: [facility] CATH LAB;  Service: Nephrology;  Laterality: N/A;    REMOVAL OF TUNNELED CENTRAL VENOUS CATHETER (CVC) N/A 9/16/2024    Procedure: REMOVAL, CATHETER, CENTRAL VENOUS, TUNNELED;  Surgeon: Abel Goldstein DO;  Location: [facility] CATH LAB;  Service: Nephrology;  Laterality: N/A;       Family History   Problem Relation Name Age of Onset    No Known Problems Mother      No Known Problems Father         Social History     Socioeconomic History    Marital status: Single   Tobacco Use    Smoking status: Former     Current packs/day: 0.25     Types: Cigarettes    Smokeless tobacco: Former   Substance and Sexual Activity    Alcohol use: Not Currently     Comment: 1/2 pint of romain daily    Drug use: Not Currently       Current Outpatient Medications   Medication Sig Dispense Refill    albuterol (PROVENTIL/VENTOLIN HFA) 90 mcg/actuation inhaler Inhale 2 puffs into the lungs every 6 (six) hours as needed for Shortness of Breath.      apixaban (ELIQUIS) 5 mg Tab Take 1 tablet (5 mg total) by mouth 2 (two) times daily. 60 tablet 5    b complex vitamins tablet Take 1 tablet by mouth 3 (three) times daily.      dexAMETHasone (DECADRON) 4 MG Tab Take 2 tablets (8 mg total) by mouth once daily. on days 2-4 of each chemotherapy cycle 6 tablet 3    diphenoxylate-atropine 2.5-0.025 mg (LOMOTIL) 2.5-0.025 mg per tablet Take 1 tablet by mouth 4 (four) times daily as needed for Diarrhea. 30 tablet 4    folic acid (FOLVITE) 1 MG tablet Take 1 tablet (1 mg total) by mouth once daily. 30 tablet 2    OLANZapine (ZYPREXA) 5 MG tablet Take 1 tablet by mouth nightly on days 1-4 of chemotherapy treatment.  Will only take for cycles 1-4. 16 tablet 0    pantoprazole (PROTONIX) 40 MG tablet Take 1 tablet (40 mg total) by mouth before breakfast. 30 tablet 2    prochlorperazine (COMPAZINE) 5 MG tablet Take 1  tablet (5 mg total) by mouth every 6 (six) hours as needed for Nausea. 30 tablet 3     No current facility-administered medications for this visit.       Review of patient's allergies indicates:  No Known Allergies      Review of systems  CONSTITUTIONAL: no fevers, no chills, no weight loss, no fatigue, no weakness  HEMATOLOGIC: no abnormal bleeding, no abnormal bruising, no drenching night sweats  ONCOLOGIC: no new masses or lumps  HEENT: no vision loss, no tinnitus or hearing loss, no nose bleeding, no dysphagia, no odynophagia  CVS: no chest pain, no palpitations, no dyspnea on exertion  RESP: no shortness of breath, no hemoptysis, no cough  GI: no nausea, no vomiting, no diarrhea, no constipation, no melena, no hematochezia, no hematemesis, no abdominal pain, no increase in abdominal girth  : no dysuria, no hematuria, no hesitancy, no scrotal swelling, no discharge  INTEGUMENT: no rashes, no abnormal bruising, no nail pitting, no hyperpigmentation  NEURO: no falls, no memory loss, no paresthesias or dysesthesias, no urofecal incontinence or retention, no loss of strength on any extremity  MSK: no back pain, no new joint pain, no joint swelling  PSYCH: no suicidal or homicidal ideation, no depression, no insomnia, no anhedonia  ENDOCRINE: no heat or cold intolerance, no polyuria, no polydipsia      Physical Exam:  Vitals:    10/15/24 1034   BP: 123/84   Pulse: 107   Resp: 18   Temp: 97.9 °F (36.6 °C)             GA: AAOx3, NAD  HEENT: NCAT, moist oral mucous membranes,  CVS: s1s2 RRR, no M/R/G, PICC line in left upper extremity without surrounding edema or erythema  RESP:  RUL reduced air entry.  EXT: no deformities, no pedal edema  SKIN: no rashes, warm and dry  NEURO: normal mentation, strength 5/5 on all 4 extremities, no sensory deficits        Assessment    # Small-cell lung cancer, probable extensive stage possible liver metastases   Reviewed pathology report and imaging studies   Discussed with patient  the diagnosis of small-cell lung cancer, the aggressive nature of disease, the questionable liver involvement and likely advanced incurable stage with palliative nature of his treatment  Patient was status post 1st cycle of carbo etoposide while inpatient, cycle 1 day 1 on 05/11/2024  He was referred for port placement however surgery deemed it appropriate for a PICC line given the concern for SVC syndrome and SVC thrombus.    Patient was agreeable to proceed with palliative systemic chemotherapy with carbo etoposide atezolizumab  Patient had a hospital admission with JESUS in July 2024 and underwent a kidney biopsy following which he was transitioned to hemodialysis with Nephrology  Given that he was currently on hemodialysis, discussed the plan for dose reductions chemotherapy and the plan for chemotherapy cycle 4 day 1 to be done 12-24 hours before his dialysis.  Will discuss patient's chemotherapy dosing with pharmacy as well  Patient reports being off dialysis since late August 2024 as directed by Nephrology   PET-CT in August 2024 with positive response to therapy  Patient transitioned to maintenance atezolizumab on 09/03/2024          Plan   Reviewed labs, proceed with atezolizumab today   Plan to follow-up in 3 weeks, labs the day prior to next cycle of treatment.  Continue Eliquis 5 BID  Follow-up with Nephrology as scheduled.

## 2024-10-17 ENCOUNTER — PATIENT MESSAGE (OUTPATIENT)
Dept: RESEARCH | Facility: HOSPITAL | Age: 54
End: 2024-10-17
Payer: MEDICAID

## 2024-10-28 PROBLEM — N17.9 AKI (ACUTE KIDNEY INJURY): Status: RESOLVED | Noted: 2024-07-29 | Resolved: 2024-10-28

## 2024-11-11 ENCOUNTER — TELEPHONE (OUTPATIENT)
Dept: HEMATOLOGY/ONCOLOGY | Facility: CLINIC | Age: 54
End: 2024-11-11
Payer: MEDICAID

## 2024-11-11 DIAGNOSIS — T45.1X5A CHEMOTHERAPY-INDUCED NAUSEA: Primary | ICD-10-CM

## 2024-11-11 DIAGNOSIS — R11.0 CHEMOTHERAPY-INDUCED NAUSEA: Primary | ICD-10-CM

## 2024-11-11 RX ORDER — ONDANSETRON HYDROCHLORIDE 8 MG/1
8 TABLET, FILM COATED ORAL EVERY 8 HOURS PRN
Qty: 30 TABLET | Refills: 1 | Status: SHIPPED | OUTPATIENT
Start: 2024-11-11

## 2024-11-11 RX ORDER — PROMETHAZINE HYDROCHLORIDE 12.5 MG/1
12.5 TABLET ORAL 4 TIMES DAILY
Qty: 45 TABLET | Refills: 1 | Status: SHIPPED | OUTPATIENT
Start: 2024-11-11

## 2024-11-11 NOTE — TELEPHONE ENCOUNTER
Pt c/o sore throat and cough with thick yellow mucus for the past two weeks with SOB. Denies fever. Also reports nausea and vomiting 2 times per day for the past two weeks. Denies taking medication for nausea/vomiting. States perera not have any nausea medication. Please advise.--SC, LPN

## 2024-11-15 NOTE — PROGRESS NOTES
Referring provider:  Dr. Campbell    Reason for consultation:  Small-cell lung cancer      Diagnosis:  Small-cell lung cancer, extensive stage.      Current treatment:      09/03/2024-current:  Atezolizumab    Treatment history:    05/11/2024-08/12/2024:  Carbo etoposide atezolizumab, atezolizumab was omitted on 1st cycle due to being inpatient.  Dates unknown:  Concurrent radiation therapy     HPI:    Patient was a 54-year-old male with history of alcohol use presented to the [facility] with symptoms of left neck swelling and change in speech.  In the ER he would soft tissue neck CT done which revealed generalized edema throughout the soft tissue as well as right jugular vein engorgement and a large lung mass in the right lung apex concerning for malignancy.  Patient was started on Lovenox 1 milligram/kg b.i.d. 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  A CT of the abdomen and pelvis on 05/06/2024 revealed questionable metastases to the liver.  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  Patient received cycle 1 of carbo etoposide on 05/11/2024 while inpatient at [facility].  He was referred to our clinic for outpatient care and further treatment management.  He presented to clinic on 05/24/2024 to establish care.    Patient reports history of smoking, half pack per day, 6 pack of beer daily, prior history of recreational drug use.  Last use 5 years back.  He lives with a friend.  He works offshore on oil field.  Denies family history of malignancy.    He was hospitalized at [facility] in July 2024 for JESUS after cycle 3 of chemotherapy thought to be secondary to nephrotoxic ATN versus acute interstitial nephritis and underwent a kidney biopsy.  he was on hemodialysis 3 times weekly with Nephrology  which was stopped in late August 2024      Interval history:    Today, 11/17/2024, patient denies any acute concerns today.  He is off dialysis per recommendations from Nephrology.  He reports tolerating his last cycle of treatment fairly well without any significant side effects.  He denies any headaches, vision changes or focal weakness. He denies any fevers, chills, nausea, vomiting, ER or hospital visits since last follow-up visit with us.    Pathology     05/10/2024 for our pulmonary lymph node biopsy small-cell carcinoma, grade 3 neuroendocrine carcinoma with extensive necrosis.  Lymph node pulmonary 7 L EBUS biopsy small-cell carcinoma, with extensive necrosis.      Imaging     08/26/2024 PET-CT:  Cavitary masslike opacity in the periphery of the right upper lobe with moderate uptake in keeping with the patient was known cancer.  Patchy ground-glass and reticular densities in the right upper lobe with mild uptake likely represents postradiation pneumonitis.  However continued monitoring is recommended.  Nonspecific mild uptake in the small precarinal lymph node that she will be monitored.  Multiple osteoblastic lesions without abnormal uptake, possibly representing treated metastases.  The cavitary lesion measures 3.8 x 3 cm with an SUV of 4.7.    06/10/24 NM Bone scan: There are multiple areas of increased uptake within the thoracic and lumbar region possible related to arthritis, however, a metastatic disease must be considered.     05/07/2024 MRI brain with and without contrast:  No obvious metastatic disease seen however large areas of cerebral convexity specifically on the right side and to a lesser extent on the left side are not well evaluated on this examination due to artifact.    05/06/24 CT Chest w/ contrast: Large mass in right lung apex right hilum and mediastinum measuring 11 cm X 9 cm X 7.2 cm causing obstruction of the superior vena cava at the junction of the brachiocephalic veins.      05/06/24 CT soft tissue neck w/ contrast- BINTA  05/06/24 CT Head/Brain w/ contrast: - BINTA  05/06/2024 CT abdomen pelvis with IV contrast:  Nonspecific hypodense lesion is identified in the liver may represent metastatic disease.  Other secondary findings as noted above.  The liver mass is 9 mm in size.      Laboratory   11/17/24: cr 0.89, alb 3.9, ca 9.4, LFTs WNL, alkphos 126, wbc 4.35, hgb 15.1, plt 2257, ANC 2.86, TSH 3.1129  10/14/24: cr 1.1, alb 3.6, ca 9.6, LFTs WNL, alkphos 147, wbc 6.7, hgb 13.8, plt 216, ANC 5.11, TSH 2.25  09/02/2024 creatinine 1.1, ALK phos 142, ALT 14, , T bili 0.3, WBC count 5.6, hemoglobin 12.0, .5, platelet count 272, ANC 3.86.  09/02/2024 creatinine 1.5, LFTs unremarkable, WBC count 3.2, hemoglobin 8.8, MCV 97.5, platelet count 221.  08/12/24: cr 5.16, alb 3.5, ca 10.4, mag 2.5, phosphorus 4.3, TSH 0.968, cortisol 14.9, wbc 6.20, hgb 8.5, plt 169, ANC 5.07  07/16/24: cr 3.52, alb 2.7, ca 8.9, LFTs WNL, mag 2.0, phosphorus 3.4, cortisol 19.8, tsh 1.34, wbc 5.23, hgb 9.8, plt 195, ANC 3.86  06/24/24: cr 0.7, alb 3.3, ca 9.4, LFTs WNL, TSH 1.83, mag 2.0, phosphorus 4.1,  wbc 5.3, hgb 13.4, plt 469, cortisol pending   06/02/2024:  Creatinine 0 point 7, LFTs unremarkable, TSH 0.98, WBC count 14.6, hemoglobin 14, MCV 95.4, platelet count 267, cortisol pending.      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       Past Surgical History:   Procedure Laterality Date    CATARACT EXTRACTION W/  INTRAOCULAR LENS IMPLANT Left 06/28/2023    Dr Fritz Cornejo    ENDOBRONCHIAL  ULTRASOUND N/A 05/08/2024    Dr Kayden Smart    HERNIA REPAIR  1993    INSERTION OF TUNNELED CENTRAL VENOUS HEMODIALYSIS CATHETER N/A 7/29/2024    Procedure: Insertion, Catheter, Central Venous, Hemodialysis;  Surgeon: Abel Goldstein DO;  Location: [facility] CATH LAB;  Service: Nephrology;  Laterality: N/A;    REMOVAL OF TUNNELED CENTRAL VENOUS CATHETER (CVC) N/A 9/16/2024    Procedure: REMOVAL, CATHETER, CENTRAL VENOUS, TUNNELED;  Surgeon: Abel Goldstein DO;  Location: [facility] CATH LAB;  Service: Nephrology;  Laterality: N/A;       Family History   Problem Relation Name Age of Onset    No Known Problems Mother      No Known Problems Father         Social History     Socioeconomic History    Marital status: Single   Tobacco Use    Smoking status: Former     Current packs/day: 0.25     Types: Cigarettes    Smokeless tobacco: Former   Substance and Sexual Activity    Alcohol use: Not Currently     Comment: 1/2 pint of romain daily    Drug use: Not Currently       Current Outpatient Medications   Medication Sig Dispense Refill    apixaban (ELIQUIS) 5 mg Tab Take 1 tablet (5 mg total) by mouth 2 (two) times daily. 60 tablet 5    b complex vitamins tablet Take 1 tablet by mouth 3 (three) times daily.      ondansetron (ZOFRAN) 8 MG tablet Take 1 tablet (8 mg total) by mouth every 8 (eight) hours as needed for Nausea. 30 tablet 1    prochlorperazine (COMPAZINE) 5 MG tablet Take 1 tablet (5 mg total) by mouth every 6 (six) hours as needed for Nausea. 30 tablet 3    promethazine (PHENERGAN) 12.5 MG Tab Take 1 tablet (12.5 mg total) by mouth 4 (four) times daily. 45 tablet 1    albuterol (PROVENTIL/VENTOLIN HFA) 90 mcg/actuation inhaler Inhale 2 puffs into the lungs every 6 (six) hours as needed for Shortness of Breath. (Patient not taking: Reported on 11/18/2024)      dexAMETHasone (DECADRON) 4 MG Tab Take 2 tablets (8 mg total) by mouth once daily. on days 2-4 of each chemotherapy cycle (Patient not taking: Reported on 11/18/2024)  6 tablet 3    diphenoxylate-atropine 2.5-0.025 mg (LOMOTIL) 2.5-0.025 mg per tablet Take 1 tablet by mouth 4 (four) times daily as needed for Diarrhea. (Patient not taking: Reported on 11/18/2024) 30 tablet 4    folic acid (FOLVITE) 1 MG tablet Take 1 tablet (1 mg total) by mouth once daily. 30 tablet 2    OLANZapine (ZYPREXA) 5 MG tablet Take 1 tablet by mouth nightly on days 1-4 of chemotherapy treatment.  Will only take for cycles 1-4. (Patient not taking: Reported on 11/18/2024) 16 tablet 0    pantoprazole (PROTONIX) 40 MG tablet Take 1 tablet (40 mg total) by mouth before breakfast. 30 tablet 2     No current facility-administered medications for this visit.       Review of patient's allergies indicates:  No Known Allergies      Review of systems  CONSTITUTIONAL: no fevers, no chills, no weight loss, no fatigue, no weakness  HEMATOLOGIC: no abnormal bleeding, no abnormal bruising, no drenching night sweats  ONCOLOGIC: no new masses or lumps  HEENT: no vision loss, no tinnitus or hearing loss, no nose bleeding, no dysphagia, no odynophagia  CVS: no chest pain, no palpitations, no dyspnea on exertion  RESP: no shortness of breath, no hemoptysis, no cough  GI: no nausea, no vomiting, no diarrhea, no constipation, no melena, no hematochezia, no hematemesis, no abdominal pain, no increase in abdominal girth  : no dysuria, no hematuria, no hesitancy, no scrotal swelling, no discharge  INTEGUMENT: no rashes, no abnormal bruising, no nail pitting, no hyperpigmentation  NEURO: no falls, no memory loss, no paresthesias or dysesthesias, no urofecal incontinence or retention, no loss of strength on any extremity  MSK: no back pain, no new joint pain, no joint swelling  PSYCH: no suicidal or homicidal ideation, no depression, no insomnia, no anhedonia  ENDOCRINE: no heat or cold intolerance, no polyuria, no polydipsia      Physical Exam:  Vitals:    11/18/24 1332   BP: 127/86   Pulse: 102   Resp: 20   Temp: 98 °F (36.7  °C)               GA: AAOx3, NAD  HEENT: NCAT, moist oral mucous membranes,  CVS: s1s2 RRR, no M/R/G, PICC line in left upper extremity without surrounding edema or erythema  RESP:  RUL reduced air entry.  EXT: no deformities, no pedal edema  SKIN: no rashes, warm and dry  NEURO: normal mentation, strength 5/5 on all 4 extremities, no sensory deficits        Assessment    # Small-cell lung cancer, probable extensive stage possible liver metastases   Reviewed pathology report and imaging studies   Discussed with patient the diagnosis of small-cell lung cancer, the aggressive nature of disease, the questionable liver involvement and likely advanced incurable stage with palliative nature of his treatment  Patient was status post 1st cycle of carbo etoposide while inpatient, cycle 1 day 1 on 05/11/2024  He was referred for port placement however surgery deemed it appropriate for a PICC line given the concern for SVC syndrome and SVC thrombus.    Patient was agreeable to proceed with palliative systemic chemotherapy with carbo etoposide atezolizumab  Patient had a hospital admission with JESUS in July 2024 and underwent a kidney biopsy following which he was transitioned to hemodialysis with Nephrology  Given that he was currently on hemodialysis, discussed the plan for dose reductions chemotherapy and the plan for chemotherapy cycle 4 day 1 to be done 12-24 hours before his dialysis.  Will discuss patient's chemotherapy dosing with pharmacy as well  Patient reports being off dialysis since late August 2024 as directed by Nephrology   PET-CT in August 2024 with positive response to therapy  Patient transitioned to maintenance atezolizumab on 09/03/2024    Plan   Reviewed labs, proceed with atezolizumab today   Will order PET CT today to be resulted before next visit  Plan to follow-up in 3 weeks, labs the day prior to next cycle of treatment.  Continue Eliquis 5 BID  Follow-up with Nephrology as scheduled.    Rosita Aguirre  FNP-C

## 2024-11-18 ENCOUNTER — OFFICE VISIT (OUTPATIENT)
Dept: HEMATOLOGY/ONCOLOGY | Facility: CLINIC | Age: 54
End: 2024-11-18
Payer: MEDICAID

## 2024-11-18 VITALS
HEART RATE: 102 BPM | SYSTOLIC BLOOD PRESSURE: 127 MMHG | OXYGEN SATURATION: 99 % | DIASTOLIC BLOOD PRESSURE: 86 MMHG | RESPIRATION RATE: 20 BRPM | HEIGHT: 66 IN | TEMPERATURE: 98 F | WEIGHT: 121.19 LBS | BODY MASS INDEX: 19.48 KG/M2

## 2024-11-18 DIAGNOSIS — K76.9 LIVER LESION: ICD-10-CM

## 2024-11-18 DIAGNOSIS — C34.81 SMALL CELL LUNG CANCER, OVERLAPPING SITES OF RIGHT LUNG: Primary | ICD-10-CM

## 2024-11-18 DIAGNOSIS — C34.81 MALIGNANT NEOPLASM OF OVERLAPPING SITES OF RIGHT LUNG: ICD-10-CM

## 2024-11-18 DIAGNOSIS — G89.3 CANCER RELATED PAIN: ICD-10-CM

## 2024-11-18 DIAGNOSIS — Z79.01 CURRENT USE OF LONG TERM ANTICOAGULATION: ICD-10-CM

## 2024-11-18 DIAGNOSIS — I87.1 SUPERIOR VENA CAVA SYNDROME: ICD-10-CM

## 2024-11-18 PROCEDURE — 3008F BODY MASS INDEX DOCD: CPT | Mod: CPTII,,,

## 2024-11-18 PROCEDURE — 3079F DIAST BP 80-89 MM HG: CPT | Mod: CPTII,,,

## 2024-11-18 PROCEDURE — 99215 OFFICE O/P EST HI 40 MIN: CPT | Mod: ,,,

## 2024-11-18 PROCEDURE — 1160F RVW MEDS BY RX/DR IN RCRD: CPT | Mod: CPTII,,,

## 2024-11-18 PROCEDURE — 3074F SYST BP LT 130 MM HG: CPT | Mod: CPTII,,,

## 2024-11-18 PROCEDURE — 1159F MED LIST DOCD IN RCRD: CPT | Mod: CPTII,,,

## 2024-11-18 RX ORDER — EPINEPHRINE 0.3 MG/.3ML
0.3 INJECTION SUBCUTANEOUS ONCE AS NEEDED
Status: CANCELLED | OUTPATIENT
Start: 2024-11-18

## 2024-11-18 RX ORDER — PROCHLORPERAZINE EDISYLATE 5 MG/ML
10 INJECTION INTRAMUSCULAR; INTRAVENOUS ONCE AS NEEDED
Status: CANCELLED | OUTPATIENT
Start: 2024-11-18

## 2024-11-18 RX ORDER — HEPARIN 100 UNIT/ML
500 SYRINGE INTRAVENOUS
Status: CANCELLED | OUTPATIENT
Start: 2024-11-18

## 2024-11-18 RX ORDER — DIPHENHYDRAMINE HYDROCHLORIDE 50 MG/ML
50 INJECTION INTRAMUSCULAR; INTRAVENOUS ONCE AS NEEDED
Status: CANCELLED | OUTPATIENT
Start: 2024-11-18

## 2024-11-18 RX ORDER — SODIUM CHLORIDE 0.9 % (FLUSH) 0.9 %
10 SYRINGE (ML) INJECTION
Status: CANCELLED | OUTPATIENT
Start: 2024-11-18

## 2024-12-09 ENCOUNTER — OFFICE VISIT (OUTPATIENT)
Dept: HEMATOLOGY/ONCOLOGY | Facility: CLINIC | Age: 54
End: 2024-12-09
Payer: MEDICAID

## 2024-12-09 VITALS
BODY MASS INDEX: 19.85 KG/M2 | TEMPERATURE: 98 F | WEIGHT: 123.5 LBS | SYSTOLIC BLOOD PRESSURE: 124 MMHG | OXYGEN SATURATION: 99 % | HEIGHT: 66 IN | DIASTOLIC BLOOD PRESSURE: 88 MMHG | HEART RATE: 101 BPM | RESPIRATION RATE: 16 BRPM

## 2024-12-09 DIAGNOSIS — K76.9 LIVER LESION: ICD-10-CM

## 2024-12-09 DIAGNOSIS — J06.9 UPPER RESPIRATORY TRACT INFECTION, UNSPECIFIED TYPE: ICD-10-CM

## 2024-12-09 DIAGNOSIS — Z51.11 ENCOUNTER FOR ANTINEOPLASTIC CHEMOTHERAPY: ICD-10-CM

## 2024-12-09 DIAGNOSIS — C80.1 SMALL CELL CARCINOMA: Primary | ICD-10-CM

## 2024-12-09 DIAGNOSIS — Z79.01 CURRENT USE OF LONG TERM ANTICOAGULATION: ICD-10-CM

## 2024-12-09 DIAGNOSIS — C34.81 MALIGNANT NEOPLASM OF OVERLAPPING SITES OF RIGHT LUNG: ICD-10-CM

## 2024-12-09 DIAGNOSIS — C34.81 SMALL CELL LUNG CANCER, OVERLAPPING SITES OF RIGHT LUNG: ICD-10-CM

## 2024-12-09 DIAGNOSIS — I87.1 SUPERIOR VENA CAVA SYNDROME: ICD-10-CM

## 2024-12-09 DIAGNOSIS — G89.3 CANCER RELATED PAIN: ICD-10-CM

## 2024-12-09 PROCEDURE — 3074F SYST BP LT 130 MM HG: CPT | Mod: CPTII,,, | Performed by: STUDENT IN AN ORGANIZED HEALTH CARE EDUCATION/TRAINING PROGRAM

## 2024-12-09 PROCEDURE — 3008F BODY MASS INDEX DOCD: CPT | Mod: CPTII,,, | Performed by: STUDENT IN AN ORGANIZED HEALTH CARE EDUCATION/TRAINING PROGRAM

## 2024-12-09 PROCEDURE — 3079F DIAST BP 80-89 MM HG: CPT | Mod: CPTII,,, | Performed by: STUDENT IN AN ORGANIZED HEALTH CARE EDUCATION/TRAINING PROGRAM

## 2024-12-09 PROCEDURE — 99215 OFFICE O/P EST HI 40 MIN: CPT | Mod: ,,, | Performed by: STUDENT IN AN ORGANIZED HEALTH CARE EDUCATION/TRAINING PROGRAM

## 2024-12-09 PROCEDURE — 1159F MED LIST DOCD IN RCRD: CPT | Mod: CPTII,,, | Performed by: STUDENT IN AN ORGANIZED HEALTH CARE EDUCATION/TRAINING PROGRAM

## 2024-12-09 RX ORDER — PROCHLORPERAZINE EDISYLATE 5 MG/ML
10 INJECTION INTRAMUSCULAR; INTRAVENOUS ONCE AS NEEDED
Status: CANCELLED | OUTPATIENT
Start: 2024-12-09

## 2024-12-09 RX ORDER — EPINEPHRINE 0.3 MG/.3ML
0.3 INJECTION SUBCUTANEOUS ONCE AS NEEDED
Status: CANCELLED | OUTPATIENT
Start: 2024-12-09

## 2024-12-09 RX ORDER — AZITHROMYCIN 250 MG/1
TABLET, FILM COATED ORAL
Qty: 6 TABLET | Refills: 0 | Status: SHIPPED | OUTPATIENT
Start: 2024-12-09 | End: 2024-12-14

## 2024-12-09 RX ORDER — DIPHENHYDRAMINE HYDROCHLORIDE 50 MG/ML
50 INJECTION INTRAMUSCULAR; INTRAVENOUS ONCE AS NEEDED
Status: CANCELLED | OUTPATIENT
Start: 2024-12-09

## 2024-12-09 RX ORDER — HEPARIN 100 UNIT/ML
500 SYRINGE INTRAVENOUS
Status: CANCELLED | OUTPATIENT
Start: 2024-12-09

## 2024-12-09 RX ORDER — SODIUM CHLORIDE 0.9 % (FLUSH) 0.9 %
10 SYRINGE (ML) INJECTION
Status: CANCELLED | OUTPATIENT
Start: 2024-12-09

## 2024-12-09 NOTE — PROGRESS NOTES
Referring provider:  Dr. Campbell    Reason for consultation:  Small-cell lung cancer      Diagnosis:  Small-cell lung cancer, extensive stage.      Current treatment:      09/03/2024-current:  Atezolizumab    Treatment history:    05/11/2024-08/12/2024:  Carbo etoposide atezolizumab, atezolizumab was omitted on 1st cycle due to being inpatient.  Dates unknown:  Concurrent radiation therapy     HPI:    Patient was a 54-year-old male with history of alcohol use presented to the [facility] with symptoms of left neck swelling and change in speech.  In the ER he would soft tissue neck CT done which revealed generalized edema throughout the soft tissue as well as right jugular vein engorgement and a large lung mass in the right lung apex concerning for malignancy.  Patient was started on Lovenox 1 milligram/kg b.i.d. 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  A CT of the abdomen and pelvis on 05/06/2024 revealed questionable metastases to the liver.  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  Patient received cycle 1 of carbo etoposide on 05/11/2024 while inpatient at [facility].  He was referred to our clinic for outpatient care and further treatment management.  He presented to clinic on 05/24/2024 to establish care.    Patient reports history of smoking, half pack per day, 6 pack of beer daily, prior history of recreational drug use.  Last use 5 years back.  He lives with a friend.  He works offshore on oil field.  Denies family history of malignancy.    He was hospitalized at [facility] in July 2024 for JESUS after cycle 3 of chemotherapy thought to be secondary to nephrotoxic ATN versus acute interstitial nephritis and underwent a kidney biopsy.  he was on hemodialysis 3 times weekly with Nephrology  which was stopped in late August 2024      Interval history:    Today, 12/09/2024, patient reports symptoms of productive cough with yellow phlegm for about 2 weeks.  He denies any fevers, chills shortness of breath.  He reports tolerating his last cycle of treatment fairly well without any significant side effects.  He denies any new foci of pain.  He reports intermittent low back pain which resolves without intervention.  He reports a fair appetite denies any weight loss.  He reports good oral hydration.  He reports compliance with Eliquis.      Pathology     05/10/2024 for our pulmonary lymph node biopsy small-cell carcinoma, grade 3 neuroendocrine carcinoma with extensive necrosis.  Lymph node pulmonary 7 L EBUS biopsy small-cell carcinoma, with extensive necrosis.      Imaging     12/06/2024 PET-CT:  No significant change in size or metabolically activity of the cavitary mass in periphery of the right upper lobe.  Stable metabolically Haim osseous metastases.  Increased patchy reticular and ground-glass opacities in the right lung with mild uptake likely inflammatory in etiology.  Continued monitoring is recommended.  Physiological brown fat uptake      08/26/2024 PET-CT:  Cavitary masslike opacity in the periphery of the right upper lobe with moderate uptake in keeping with the patient was known cancer.  Patchy ground-glass and reticular densities in the right upper lobe with mild uptake likely represents postradiation pneumonitis.  However continued monitoring is recommended.  Nonspecific mild uptake in the small precarinal lymph node that she will be monitored.  Multiple osteoblastic lesions without abnormal uptake, possibly representing treated metastases.  The cavitary lesion measures 3.8 x 3 cm with an SUV of 4.7.    06/10/24 NM Bone scan: There are multiple areas of increased uptake within the thoracic and lumbar region possible related to arthritis, however, a metastatic disease must be considered.      05/07/2024 MRI brain with and without contrast:  No obvious metastatic disease seen however large areas of cerebral convexity specifically on the right side and to a lesser extent on the left side are not well evaluated on this examination due to artifact.    05/06/24 CT Chest w/ contrast: Large mass in right lung apex right hilum and mediastinum measuring 11 cm X 9 cm X 7.2 cm causing obstruction of the superior vena cava at the junction of the brachiocephalic veins.     05/06/24 CT soft tissue neck w/ contrast- BINTA  05/06/24 CT Head/Brain w/ contrast: - BINTA  05/06/2024 CT abdomen pelvis with IV contrast:  Nonspecific hypodense lesion is identified in the liver may represent metastatic disease.  Other secondary findings as noted above.  The liver mass is 9 mm in size.      Laboratory     12/09/2024 creatinine 1.2, LFTs unremarkable, TSH 3.6, cortisol 13.9, WBC count 5.4, hemoglobin 14.6, MCV 98, platelet count 278, ANC 4.2.    11/17/24: cr 0.89, alb 3.9, ca 9.4, LFTs WNL, alkphos 126, wbc 4.35, hgb 15.1, plt 2257, ANC 2.86, TSH 3.1129  10/14/24: cr 1.1, alb 3.6, ca 9.6, LFTs WNL, alkphos 147, wbc 6.7, hgb 13.8, plt 216, ANC 5.11, TSH 2.25  09/02/2024 creatinine 1.1, ALK phos 142, ALT 14, , T bili 0.3, WBC count 5.6, hemoglobin 12.0, .5, platelet count 272, ANC 3.86.  09/02/2024 creatinine 1.5, LFTs unremarkable, WBC count 3.2, hemoglobin 8.8, MCV 97.5, platelet count 221.  08/12/24: cr 5.16, alb 3.5, ca 10.4, mag 2.5, phosphorus 4.3, TSH 0.968, cortisol 14.9, wbc 6.20, hgb 8.5, plt 169, ANC 5.07  07/16/24: cr 3.52, alb 2.7, ca 8.9, LFTs WNL, mag 2.0, phosphorus 3.4, cortisol 19.8, tsh 1.34, wbc 5.23, hgb 9.8, plt 195, ANC 3.86  06/24/24: cr 0.7, alb 3.3, ca 9.4, LFTs WNL, TSH 1.83, mag 2.0, phosphorus 4.1,  wbc 5.3, hgb 13.4, plt 469, cortisol pending   06/02/2024:  Creatinine 0 point 7, LFTs unremarkable, TSH 0.98, WBC count 14.6, hemoglobin 14, MCV 95.4, platelet count 267, cortisol  pending.      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       Past Surgical History:   Procedure Laterality Date    CATARACT EXTRACTION W/  INTRAOCULAR LENS IMPLANT Left 06/28/2023    Dr Fritz Cornejo    ENDOBRONCHIAL ULTRASOUND N/A 05/08/2024    Dr Kayden Smart    HERNIA REPAIR  1993    INSERTION OF TUNNELED CENTRAL VENOUS HEMODIALYSIS CATHETER N/A 7/29/2024    Procedure: Insertion, Catheter, Central Venous, Hemodialysis;  Surgeon: Abel Goldstein DO;  Location: [facility] CATH LAB;  Service: Nephrology;  Laterality: N/A;    REMOVAL OF TUNNELED CENTRAL VENOUS CATHETER (CVC) N/A 9/16/2024    Procedure: REMOVAL, CATHETER, CENTRAL VENOUS, TUNNELED;  Surgeon: Abel Goldstein DO;  Location: [facility] CATH LAB;  Service: Nephrology;  Laterality: N/A;       Family History   Problem Relation Name Age of Onset    No Known Problems Mother      No Known Problems Father         Social History     Socioeconomic History    Marital status: Single   Tobacco Use    Smoking status: Former     Current packs/day: 0.25     Types: Cigarettes    Smokeless tobacco: Former   Substance and Sexual Activity    Alcohol use: Not Currently     Comment: 1/2 pint yasir hoyos daily    Drug use: Not Currently       Current Outpatient Medications   Medication Sig Dispense Refill    apixaban (ELIQUIS) 5 mg Tab Take 1 tablet (5 mg total) by mouth 2 (two) times daily. 60 tablet 5    b complex vitamins tablet Take 1 tablet by mouth 3 (three) times daily.      folic acid (FOLVITE) 1 MG tablet Take 1 tablet (1 mg total) by mouth once daily. 30 tablet 2     ondansetron (ZOFRAN) 8 MG tablet Take 1 tablet (8 mg total) by mouth every 8 (eight) hours as needed for Nausea. 30 tablet 1    prochlorperazine (COMPAZINE) 5 MG tablet Take 1 tablet (5 mg total) by mouth every 6 (six) hours as needed for Nausea. 30 tablet 3    promethazine (PHENERGAN) 12.5 MG Tab Take 1 tablet (12.5 mg total) by mouth 4 (four) times daily. 45 tablet 1    albuterol (PROVENTIL/VENTOLIN HFA) 90 mcg/actuation inhaler Inhale 2 puffs into the lungs every 6 (six) hours as needed for Shortness of Breath. (Patient not taking: Reported on 10/15/2024)      dexAMETHasone (DECADRON) 4 MG Tab Take 2 tablets (8 mg total) by mouth once daily. on days 2-4 of each chemotherapy cycle (Patient not taking: Reported on 10/15/2024) 6 tablet 3    diphenoxylate-atropine 2.5-0.025 mg (LOMOTIL) 2.5-0.025 mg per tablet Take 1 tablet by mouth 4 (four) times daily as needed for Diarrhea. (Patient not taking: Reported on 10/15/2024) 30 tablet 4    OLANZapine (ZYPREXA) 5 MG tablet Take 1 tablet by mouth nightly on days 1-4 of chemotherapy treatment.  Will only take for cycles 1-4. (Patient not taking: Reported on 10/15/2024) 16 tablet 0    pantoprazole (PROTONIX) 40 MG tablet Take 1 tablet (40 mg total) by mouth before breakfast. 30 tablet 2     No current facility-administered medications for this visit.       Review of patient's allergies indicates:  No Known Allergies      Review of systems  CONSTITUTIONAL: no fevers, no chills, no weight loss, no fatigue, no weakness  HEMATOLOGIC: no abnormal bleeding, no abnormal bruising, no drenching night sweats  ONCOLOGIC: no new masses or lumps  HEENT: no vision loss, no tinnitus or hearing loss, no nose bleeding, no dysphagia, no odynophagia  CVS: no chest pain, no palpitations, no dyspnea on exertion  RESP: no shortness of breath, no hemoptysis, no cough  GI: no nausea, no vomiting, no diarrhea, no constipation, no melena, no hematochezia, no hematemesis, no abdominal pain, no  increase in abdominal girth  : no dysuria, no hematuria, no hesitancy, no scrotal swelling, no discharge  INTEGUMENT: no rashes, no abnormal bruising, no nail pitting, no hyperpigmentation  NEURO: no falls, no memory loss, no paresthesias or dysesthesias, no urofecal incontinence or retention, no loss of strength on any extremity  MSK: no back pain, no new joint pain, no joint swelling  PSYCH: no suicidal or homicidal ideation, no depression, no insomnia, no anhedonia  ENDOCRINE: no heat or cold intolerance, no polyuria, no polydipsia      Physical Exam:  Vitals:    12/09/24 1302   BP: 124/88   Pulse: 101   Resp: 16   Temp: 98.3 °F (36.8 °C)               GA: AAOx3, NAD  HEENT: NCAT, moist oral mucous membranes,  CVS: s1s2 RRR, no M/R/G, PICC line in left upper extremity without surrounding edema or erythema  RESP:  Bibasilar crackles, no rhonchi.  Good bilateral air entry.  EXT: no deformities, no pedal edema  SKIN: no rashes, warm and dry  NEURO: normal mentation, strength 5/5 on all 4 extremities, no sensory deficits        Assessment    # Small-cell lung cancer, probable extensive stage possible liver metastases   Reviewed pathology report and imaging studies   Discussed with patient the diagnosis of small-cell lung cancer, the aggressive nature of disease, the questionable liver involvement and likely advanced incurable stage with palliative nature of his treatment  Patient was status post 1st cycle of carbo etoposide while inpatient, cycle 1 day 1 on 05/11/2024  He was referred for port placement however surgery deemed it appropriate for a PICC line given the concern for SVC syndrome and SVC thrombus.    Patient was agreeable to proceed with palliative systemic chemotherapy with carbo etoposide atezolizumab  Patient had a hospital admission with JESUS in July 2024 and underwent a kidney biopsy following which he was transitioned to hemodialysis with Nephrology  Given that he was currently on hemodialysis,  discussed the plan for dose reductions chemotherapy and the plan for chemotherapy cycle 4 day 1 to be done 12-24 hours before his dialysis.  Will discuss patient's chemotherapy dosing with pharmacy as well  Patient reports being off dialysis since late August 2024 as directed by Nephrology   PET-CT in August 2024 with positive response to therapy  Patient transitioned to maintenance atezolizumab on 09/03/2024  PET-CT in December 2024 with stable disease, no evidence of progression.      # SVC thrombus  On Eliquis 5 mg b.i.d.    Plan   Reviewed labs, proceed with atezolizumab today   Azithromycin for 5 days for developing URI.  Plan to follow-up in 3 weeks, labs the day prior to next cycle of treatment.  Continue Eliquis 5 BID  Follow-up with Nephrology as scheduled.    Portions of the record may have been created with voice recognition software. Occasional wrong-word or sound-a-like substitutions may have occurred due to the inherent limitations of voice recognition software.

## 2025-02-03 NOTE — PROGRESS NOTES
Referring provider:  Dr. Campbell    Reason for consultation:  Small-cell lung cancer      Diagnosis:  Small-cell lung cancer, extensive stage.      Current treatment:      09/03/2024-current:  Atezolizumab    Treatment history:    05/11/2024-08/12/2024:  Carbo etoposide atezolizumab, atezolizumab was omitted on 1st cycle due to being inpatient.  Dates unknown:  Concurrent radiation therapy     HPI:    Patient was a 54-year-old male with history of alcohol use presented to the [facility] with symptoms of left neck swelling and change in speech.  In the ER he would soft tissue neck CT done which revealed generalized edema throughout the soft tissue as well as right jugular vein engorgement and a large lung mass in the right lung apex concerning for malignancy.  Patient was started on Lovenox 1 milligram/kg b.i.d. 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  A CT of the abdomen and pelvis on 05/06/2024 revealed questionable metastases to the liver.  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  Patient received cycle 1 of carbo etoposide on 05/11/2024 while inpatient at [facility].  He was referred to our clinic for outpatient care and further treatment management.  He presented to clinic on 05/24/2024 to establish care.    Patient reports history of smoking, half pack per day, 6 pack of beer daily, prior history of recreational drug use.  Last use 5 years back.  He lives with a friend.  He works offshore on oil field.  Denies family history of malignancy.    He was hospitalized at [facility] in July 2024 for JESUS after cycle 3 of chemotherapy thought to be secondary to nephrotoxic ATN versus acute interstitial nephritis and underwent a kidney biopsy.  he was on hemodialysis 3 times weekly with Nephrology  which was stopped in late August 2024      Interval history:    Today, 2/4/25, patient reports recently having the flu and feeling much better since. He denies any fevers, chills shortness of breath.  He reports tolerating his last cycle of treatment fairly well without any significant side effects.  He denies any new foci of pain.  He reports intermittent low back pain which resolves without intervention.  He reports a fair appetite denies any weight loss.  He reports good oral hydration.  He reports compliance with Eliquis.      Pathology     05/10/2024 for our pulmonary lymph node biopsy small-cell carcinoma, grade 3 neuroendocrine carcinoma with extensive necrosis.  Lymph node pulmonary 7 L EBUS biopsy small-cell carcinoma, with extensive necrosis.      Imaging     12/06/2024 PET-CT:  No significant change in size or metabolically activity of the cavitary mass in periphery of the right upper lobe.  Stable metabolically [place] osseous metastases.  Increased patchy reticular and ground-glass opacities in the right lung with mild uptake likely inflammatory in etiology.  Continued monitoring is recommended.  Physiological brown fat uptake      08/26/2024 PET-CT:  Cavitary masslike opacity in the periphery of the right upper lobe with moderate uptake in keeping with the patient was known cancer.  Patchy ground-glass and reticular densities in the right upper lobe with mild uptake likely represents postradiation pneumonitis.  However continued monitoring is recommended.  Nonspecific mild uptake in the small precarinal lymph node that she will be monitored.  Multiple osteoblastic lesions without abnormal uptake, possibly representing treated metastases.  The cavitary lesion measures 3.8 x 3 cm with an SUV of 4.7.    06/10/24 NM Bone scan: There are multiple areas of increased uptake within the thoracic and lumbar region possible related to arthritis, however, a metastatic disease must be considered.     05/07/2024  MRI brain with and without contrast:  No obvious metastatic disease seen however large areas of cerebral convexity specifically on the right side and to a lesser extent on the left side are not well evaluated on this examination due to artifact.    05/06/24 CT Chest w/ contrast: Large mass in right lung apex right hilum and mediastinum measuring 11 cm X 9 cm X 7.2 cm causing obstruction of the superior vena cava at the junction of the brachiocephalic veins.     05/06/24 CT soft tissue neck w/ contrast- BINTA  05/06/24 CT Head/Brain w/ contrast: - BINTA  05/06/2024 CT abdomen pelvis with IV contrast:  Nonspecific hypodense lesion is identified in the liver may represent metastatic disease.  Other secondary findings as noted above.  The liver mass is 9 mm in size.      Laboratory   2/3/25 creatinine 1.0, LFTs unremarkable, ALK phos 135, TSH 3.52, cortisol pending, WBC count 6.3, hemoglobin 13.7, MCV 99.3, platelet count 276, ANC 4.64.    12/09/2024 creatinine 1.2, LFTs unremarkable, TSH 3.6, cortisol 13.9, WBC count 5.4, hemoglobin 14.6, MCV 98, platelet count 278, ANC 4.2.    11/17/24: cr 0.89, alb 3.9, ca 9.4, LFTs WNL, alkphos 126, wbc 4.35, hgb 15.1, plt 2257, ANC 2.86, TSH 3.1129  10/14/24: cr 1.1, alb 3.6, ca 9.6, LFTs WNL, alkphos 147, wbc 6.7, hgb 13.8, plt 216, ANC 5.11, TSH 2.25  09/02/2024 creatinine 1.1, ALK phos 142, ALT 14, , T bili 0.3, WBC count 5.6, hemoglobin 12.0, .5, platelet count 272, ANC 3.86.  09/02/2024 creatinine 1.5, LFTs unremarkable, WBC count 3.2, hemoglobin 8.8, MCV 97.5, platelet count 221.  08/12/24: cr 5.16, alb 3.5, ca 10.4, mag 2.5, phosphorus 4.3, TSH 0.968, cortisol 14.9, wbc 6.20, hgb 8.5, plt 169, ANC 5.07  07/16/24: cr 3.52, alb 2.7, ca 8.9, LFTs WNL, mag 2.0, phosphorus 3.4, cortisol 19.8, tsh 1.34, wbc 5.23, hgb 9.8, plt 195, ANC 3.86  06/24/24: cr 0.7, alb 3.3, ca 9.4, LFTs WNL, TSH 1.83, mag 2.0, phosphorus 4.1,  wbc 5.3, hgb 13.4, plt 469, cortisol pending    06/02/2024:  Creatinine 0 point 7, LFTs unremarkable, TSH 0.98, WBC count 14.6, hemoglobin 14, MCV 95.4, platelet count 267, cortisol pending.      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       Past Surgical History:   Procedure Laterality Date    CATARACT EXTRACTION W/  INTRAOCULAR LENS IMPLANT Left 06/28/2023    Dr Fritz Cornejo    ENDOBRONCHIAL ULTRASOUND N/A 05/08/2024    Dr Kayden Smart    HERNIA REPAIR  1993    INSERTION OF TUNNELED CENTRAL VENOUS HEMODIALYSIS CATHETER N/A 7/29/2024    Procedure: Insertion, Catheter, Central Venous, Hemodialysis;  Surgeon: Abel Goldstein DO;  Location: [facility] CATH LAB;  Service: Nephrology;  Laterality: N/A;    REMOVAL OF TUNNELED CENTRAL VENOUS CATHETER (CVC) N/A 9/16/2024    Procedure: REMOVAL, CATHETER, CENTRAL VENOUS, TUNNELED;  Surgeon: Abel Goldstein DO;  Location: [facility] CATH LAB;  Service: Nephrology;  Laterality: N/A;       Family History   Problem Relation Name Age of Onset    No Known Problems Mother      No Known Problems Father         Social History     Socioeconomic History    Marital status: Single   Tobacco Use    Smoking status: Former     Current packs/day: 0.25     Types: Cigarettes    Smokeless tobacco: Former   Substance and Sexual Activity    Alcohol use: Not Currently     Comment: 1/2 pint of romain daily    Drug use: Not Currently       Current Outpatient Medications   Medication Sig Dispense Refill    apixaban (ELIQUIS) 5 mg Tab Take 1 tablet (5 mg total) by mouth 2 (two) times daily. 60 tablet 5    b complex vitamins tablet Take 1 tablet by  mouth 3 (three) times daily.      albuterol (PROVENTIL/VENTOLIN HFA) 90 mcg/actuation inhaler Inhale 2 puffs into the lungs every 6 (six) hours as needed for Shortness of Breath. (Patient not taking: Reported on 2/4/2025)      dexAMETHasone (DECADRON) 4 MG Tab Take 2 tablets (8 mg total) by mouth once daily. on days 2-4 of each chemotherapy cycle (Patient not taking: Reported on 2/4/2025) 6 tablet 3    diphenoxylate-atropine 2.5-0.025 mg (LOMOTIL) 2.5-0.025 mg per tablet Take 1 tablet by mouth 4 (four) times daily as needed for Diarrhea. (Patient not taking: Reported on 2/4/2025) 30 tablet 4    folic acid (FOLVITE) 1 MG tablet Take 1 tablet (1 mg total) by mouth once daily. 30 tablet 2    OLANZapine (ZYPREXA) 5 MG tablet Take 1 tablet by mouth nightly on days 1-4 of chemotherapy treatment.  Will only take for cycles 1-4. (Patient not taking: Reported on 2/4/2025) 16 tablet 0    ondansetron (ZOFRAN) 8 MG tablet Take 1 tablet (8 mg total) by mouth every 8 (eight) hours as needed for Nausea. (Patient not taking: Reported on 2/4/2025) 30 tablet 1    pantoprazole (PROTONIX) 40 MG tablet Take 1 tablet (40 mg total) by mouth before breakfast. 30 tablet 2    potassium chloride SA (K-DUR,KLOR-CON) 20 MEQ tablet Take 1 tablet (20 mEq total) by mouth 2 (two) times daily. for 2 days 4 tablet 0    prochlorperazine (COMPAZINE) 5 MG tablet Take 1 tablet (5 mg total) by mouth every 6 (six) hours as needed for Nausea. (Patient not taking: Reported on 2/4/2025) 30 tablet 3    promethazine (PHENERGAN) 12.5 MG Tab Take 1 tablet (12.5 mg total) by mouth 4 (four) times daily. (Patient not taking: Reported on 2/4/2025) 45 tablet 1     No current facility-administered medications for this visit.       Review of patient's allergies indicates:  No Known Allergies      Review of systems  CONSTITUTIONAL: no fevers, no chills, no weight loss, no fatigue, no weakness  HEMATOLOGIC: no abnormal bleeding, no abnormal bruising, no drenching night  sweats  ONCOLOGIC: no new masses or lumps  HEENT: no vision loss, no tinnitus or hearing loss, no nose bleeding, no dysphagia, no odynophagia  CVS: no chest pain, no palpitations, no dyspnea on exertion  RESP: no shortness of breath, no hemoptysis, no cough  GI: no nausea, no vomiting, no diarrhea, no constipation, no melena, no hematochezia, no hematemesis, no abdominal pain, no increase in abdominal girth  : no dysuria, no hematuria, no hesitancy, no scrotal swelling, no discharge  INTEGUMENT: no rashes, no abnormal bruising, no nail pitting, no hyperpigmentation  NEURO: no falls, no memory loss, no paresthesias or dysesthesias, no urofecal incontinence or retention, no loss of strength on any extremity  MSK: no back pain, no new joint pain, no joint swelling  PSYCH: no suicidal or homicidal ideation, no depression, no insomnia, no anhedonia  ENDOCRINE: no heat or cold intolerance, no polyuria, no polydipsia      Physical Exam:  Vitals:    02/04/25 0932   BP: (!) 130/90   Pulse: 108   Resp: 18   Temp: 97.8 °F (36.6 °C)                 GA: AAOx3, NAD  HEENT: NCAT, moist oral mucous membranes,  CVS: s1s2 RRR, no M/R/G, PICC line in left upper extremity without surrounding edema or erythema  RESP:  Bibasilar crackles, no rhonchi.  Good bilateral air entry.  EXT: no deformities, no pedal edema  SKIN: no rashes, warm and dry  NEURO: normal mentation, strength 5/5 on all 4 extremities, no sensory deficits        Assessment    # Small-cell lung cancer, probable extensive stage possible liver metastases   Reviewed pathology report and imaging studies   Discussed with patient the diagnosis of small-cell lung cancer, the aggressive nature of disease, the questionable liver involvement and likely advanced incurable stage with palliative nature of his treatment  Patient was status post 1st cycle of carbo etoposide while inpatient, cycle 1 day 1 on 05/11/2024  He was referred for port placement however surgery deemed it  appropriate for a PICC line given the concern for SVC syndrome and SVC thrombus.    Patient was agreeable to proceed with palliative systemic chemotherapy with carbo etoposide atezolizumab  Patient had a hospital admission with JESUS in July 2024 and underwent a kidney biopsy following which he was transitioned to hemodialysis with Nephrology  Given that he was currently on hemodialysis, discussed the plan for dose reductions chemotherapy and the plan for chemotherapy cycle 4 day 1 to be done 12-24 hours before his dialysis.  Will discuss patient's chemotherapy dosing with pharmacy as well  Patient reports being off dialysis since late August 2024 as directed by Nephrology   PET-CT in August 2024 with positive response to therapy  Patient transitioned to maintenance atezolizumab on 09/03/2024  PET-CT in December 2024 with stable disease, no evidence of progression.      # SVC thrombus  On Eliquis 5 mg b.i.d.    Plan   Reviewed labs, proceed with atezolizumab today   Plan to follow-up in 3 weeks, labs the day prior to next cycle of treatment.  Continue Eliquis 5 BID  Follow-up with Nephrology as scheduled.    Rosita MIRANDAP-C

## 2025-02-04 ENCOUNTER — OFFICE VISIT (OUTPATIENT)
Dept: HEMATOLOGY/ONCOLOGY | Facility: CLINIC | Age: 55
End: 2025-02-04
Payer: MEDICAID

## 2025-02-04 VITALS
OXYGEN SATURATION: 98 % | DIASTOLIC BLOOD PRESSURE: 90 MMHG | TEMPERATURE: 98 F | HEART RATE: 108 BPM | WEIGHT: 118.5 LBS | BODY MASS INDEX: 19.04 KG/M2 | RESPIRATION RATE: 18 BRPM | HEIGHT: 66 IN | SYSTOLIC BLOOD PRESSURE: 130 MMHG

## 2025-02-04 DIAGNOSIS — C34.81 MALIGNANT NEOPLASM OF OVERLAPPING SITES OF RIGHT LUNG: ICD-10-CM

## 2025-02-04 DIAGNOSIS — C80.1 SMALL CELL CARCINOMA: ICD-10-CM

## 2025-02-04 DIAGNOSIS — G89.3 CANCER RELATED PAIN: ICD-10-CM

## 2025-02-04 DIAGNOSIS — I87.1 SUPERIOR VENA CAVA SYNDROME: ICD-10-CM

## 2025-02-04 DIAGNOSIS — Z79.01 CURRENT USE OF LONG TERM ANTICOAGULATION: ICD-10-CM

## 2025-02-04 DIAGNOSIS — Z51.11 ENCOUNTER FOR ANTINEOPLASTIC CHEMOTHERAPY: ICD-10-CM

## 2025-02-04 DIAGNOSIS — K76.9 LIVER LESION: ICD-10-CM

## 2025-02-04 DIAGNOSIS — C34.81 SMALL CELL LUNG CANCER, OVERLAPPING SITES OF RIGHT LUNG: Primary | ICD-10-CM

## 2025-02-04 DIAGNOSIS — E87.6 HYPOKALEMIA: ICD-10-CM

## 2025-02-04 PROCEDURE — 3008F BODY MASS INDEX DOCD: CPT | Mod: CPTII,,,

## 2025-02-04 PROCEDURE — 1159F MED LIST DOCD IN RCRD: CPT | Mod: CPTII,,,

## 2025-02-04 PROCEDURE — 3080F DIAST BP >= 90 MM HG: CPT | Mod: CPTII,,,

## 2025-02-04 PROCEDURE — 99215 OFFICE O/P EST HI 40 MIN: CPT | Mod: ,,,

## 2025-02-04 PROCEDURE — 1160F RVW MEDS BY RX/DR IN RCRD: CPT | Mod: CPTII,,,

## 2025-02-04 PROCEDURE — 3075F SYST BP GE 130 - 139MM HG: CPT | Mod: CPTII,,,

## 2025-02-04 RX ORDER — DIPHENHYDRAMINE HYDROCHLORIDE 50 MG/ML
50 INJECTION INTRAMUSCULAR; INTRAVENOUS ONCE AS NEEDED
Status: CANCELLED | OUTPATIENT
Start: 2025-02-04

## 2025-02-04 RX ORDER — SODIUM CHLORIDE 0.9 % (FLUSH) 0.9 %
10 SYRINGE (ML) INJECTION
Status: CANCELLED | OUTPATIENT
Start: 2025-02-04

## 2025-02-04 RX ORDER — EPINEPHRINE 0.3 MG/.3ML
0.3 INJECTION SUBCUTANEOUS ONCE AS NEEDED
Status: CANCELLED | OUTPATIENT
Start: 2025-02-04

## 2025-02-04 RX ORDER — PROCHLORPERAZINE EDISYLATE 5 MG/ML
10 INJECTION INTRAMUSCULAR; INTRAVENOUS ONCE AS NEEDED
Status: CANCELLED | OUTPATIENT
Start: 2025-02-04

## 2025-02-04 RX ORDER — HEPARIN 100 UNIT/ML
500 SYRINGE INTRAVENOUS
Status: CANCELLED | OUTPATIENT
Start: 2025-02-04

## 2025-02-04 RX ORDER — POTASSIUM CHLORIDE 20 MEQ/1
20 TABLET, EXTENDED RELEASE ORAL 2 TIMES DAILY
Qty: 4 TABLET | Refills: 0 | Status: SHIPPED | OUTPATIENT
Start: 2025-02-04 | End: 2025-02-06

## 2025-03-19 ENCOUNTER — OFFICE VISIT (OUTPATIENT)
Dept: HEMATOLOGY/ONCOLOGY | Facility: CLINIC | Age: 55
End: 2025-03-19
Payer: MEDICAID

## 2025-03-19 VITALS
OXYGEN SATURATION: 97 % | DIASTOLIC BLOOD PRESSURE: 88 MMHG | BODY MASS INDEX: 19.53 KG/M2 | HEART RATE: 108 BPM | TEMPERATURE: 98 F | WEIGHT: 121.5 LBS | SYSTOLIC BLOOD PRESSURE: 132 MMHG | RESPIRATION RATE: 14 BRPM | HEIGHT: 66 IN

## 2025-03-19 DIAGNOSIS — Z79.01 CURRENT USE OF LONG TERM ANTICOAGULATION: ICD-10-CM

## 2025-03-19 DIAGNOSIS — I82.90 VENOUS THROMBOSIS: ICD-10-CM

## 2025-03-19 DIAGNOSIS — K76.9 LIVER LESION: ICD-10-CM

## 2025-03-19 DIAGNOSIS — C34.81 SMALL CELL LUNG CANCER, OVERLAPPING SITES OF RIGHT LUNG: Primary | ICD-10-CM

## 2025-03-19 PROCEDURE — 1159F MED LIST DOCD IN RCRD: CPT | Mod: CPTII,,, | Performed by: STUDENT IN AN ORGANIZED HEALTH CARE EDUCATION/TRAINING PROGRAM

## 2025-03-19 PROCEDURE — 3075F SYST BP GE 130 - 139MM HG: CPT | Mod: CPTII,,, | Performed by: STUDENT IN AN ORGANIZED HEALTH CARE EDUCATION/TRAINING PROGRAM

## 2025-03-19 PROCEDURE — 3008F BODY MASS INDEX DOCD: CPT | Mod: CPTII,,, | Performed by: STUDENT IN AN ORGANIZED HEALTH CARE EDUCATION/TRAINING PROGRAM

## 2025-03-19 PROCEDURE — 3079F DIAST BP 80-89 MM HG: CPT | Mod: CPTII,,, | Performed by: STUDENT IN AN ORGANIZED HEALTH CARE EDUCATION/TRAINING PROGRAM

## 2025-03-19 PROCEDURE — G2211 COMPLEX E/M VISIT ADD ON: HCPCS | Mod: ,,, | Performed by: STUDENT IN AN ORGANIZED HEALTH CARE EDUCATION/TRAINING PROGRAM

## 2025-03-19 PROCEDURE — 99215 OFFICE O/P EST HI 40 MIN: CPT | Mod: ,,, | Performed by: STUDENT IN AN ORGANIZED HEALTH CARE EDUCATION/TRAINING PROGRAM

## 2025-03-19 RX ORDER — PROCHLORPERAZINE EDISYLATE 5 MG/ML
10 INJECTION INTRAMUSCULAR; INTRAVENOUS ONCE AS NEEDED
Status: CANCELLED | OUTPATIENT
Start: 2025-03-19

## 2025-03-19 RX ORDER — SODIUM CHLORIDE 0.9 % (FLUSH) 0.9 %
10 SYRINGE (ML) INJECTION
Status: CANCELLED | OUTPATIENT
Start: 2025-03-19

## 2025-03-19 RX ORDER — EPINEPHRINE 0.3 MG/.3ML
0.3 INJECTION SUBCUTANEOUS ONCE AS NEEDED
Status: CANCELLED | OUTPATIENT
Start: 2025-03-19

## 2025-03-19 RX ORDER — HEPARIN 100 UNIT/ML
500 SYRINGE INTRAVENOUS
Status: CANCELLED | OUTPATIENT
Start: 2025-03-19

## 2025-03-19 RX ORDER — DIPHENHYDRAMINE HYDROCHLORIDE 50 MG/ML
50 INJECTION, SOLUTION INTRAMUSCULAR; INTRAVENOUS ONCE AS NEEDED
Status: CANCELLED | OUTPATIENT
Start: 2025-03-19

## 2025-03-19 NOTE — PROGRESS NOTES
Referring provider:  Dr. Campbell    Reason for consultation:  Small-cell lung cancer      Diagnosis:  Small-cell lung cancer, extensive stage.      Current treatment:      09/03/2024-current:  Atezolizumab    Treatment history:    05/11/2024-08/12/2024:  Carbo etoposide atezolizumab, atezolizumab was omitted on 1st cycle due to being inpatient.  Dates unknown:  Concurrent radiation therapy     HPI:    Patient was a 54-year-old male with history of alcohol use presented to the [facility] with symptoms of left neck swelling and change in speech.  In the ER he would soft tissue neck CT done which revealed generalized edema throughout the soft tissue as well as right jugular vein engorgement and a large lung mass in the right lung apex concerning for malignancy.  Patient was started on Lovenox 1 milligram/kg b.i.d. 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  A CT of the abdomen and pelvis on 05/06/2024 revealed questionable metastases to the liver.  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  Patient received cycle 1 of carbo etoposide on 05/11/2024 while inpatient at [facility].  He was referred to our clinic for outpatient care and further treatment management.  He presented to clinic on 05/24/2024 to establish care.    Patient reports history of smoking, half pack per day, 6 pack of beer daily, prior history of recreational drug use.  Last use 5 years back.  He lives with a friend.  He works offshore on oil field.  Denies family history of malignancy.    He was hospitalized at [facility] in July 2024 for JESUS after cycle 3 of chemotherapy thought to be secondary to nephrotoxic ATN versus acute interstitial nephritis and underwent a kidney biopsy.  he was on hemodialysis 3 times weekly with Nephrology  which was stopped in late August 2024      Interval history:    Today, 03/19/2025, patient denies any acute concerns.  He reports shoulder and back pain which is chronic in nature and unchanged from prior.  He denies any shortness of breath, headaches, vision changes or focal weakness.  He reports a fair appetite denies any weight loss.  He reports tolerating his last cycle of treatment well without any significant side effects.  Patient has had intermittent treatment breaks/delays due to logistical issues with transportation.      Pathology     05/10/2024 for our pulmonary lymph node biopsy small-cell carcinoma, grade 3 neuroendocrine carcinoma with extensive necrosis.  Lymph node pulmonary 7 L EBUS biopsy small-cell carcinoma, with extensive necrosis.      Imaging     12/06/2024 PET-CT:  No significant change in size or metabolically activity of the cavitary mass in periphery of the right upper lobe.  Stable metabolically [place] osseous metastases.  Increased patchy reticular and ground-glass opacities in the right lung with mild uptake likely inflammatory in etiology.  Continued monitoring is recommended.  Physiological brown fat uptake      08/26/2024 PET-CT:  Cavitary masslike opacity in the periphery of the right upper lobe with moderate uptake in keeping with the patient was known cancer.  Patchy ground-glass and reticular densities in the right upper lobe with mild uptake likely represents postradiation pneumonitis.  However continued monitoring is recommended.  Nonspecific mild uptake in the small precarinal lymph node that she will be monitored.  Multiple osteoblastic lesions without abnormal uptake, possibly representing treated metastases.  The cavitary lesion measures 3.8 x 3 cm with an SUV of 4.7.    06/10/24 NM Bone scan: There are multiple areas of increased uptake within the thoracic and lumbar region possible related to arthritis, however, a metastatic disease must be considered.     05/07/2024  MRI brain with and without contrast:  No obvious metastatic disease seen however large areas of cerebral convexity specifically on the right side and to a lesser extent on the left side are not well evaluated on this examination due to artifact.    05/06/24 CT Chest w/ contrast: Large mass in right lung apex right hilum and mediastinum measuring 11 cm X 9 cm X 7.2 cm causing obstruction of the superior vena cava at the junction of the brachiocephalic veins.     05/06/24 CT soft tissue neck w/ contrast- BINTA  05/06/24 CT Head/Brain w/ contrast: - BINTA  05/06/2024 CT abdomen pelvis with IV contrast:  Nonspecific hypodense lesion is identified in the liver may represent metastatic disease.  Other secondary findings as noted above.  The liver mass is 9 mm in size.      Laboratory       03/18/2025 creatinine 1.0, total bili 0.1, LFTs unremarkable, TSH 2.6, alkaline phosphatase 153, WBC count 5.8, hemoglobin 14.4, MCV 1 1.9, platelet count 207, ANC> 1000, cortisol pending.    2/3/25 creatinine 1.0, LFTs unremarkable, ALK phos 135, TSH 3.52, cortisol pending, WBC count 6.3, hemoglobin 13.7, MCV 99.3, platelet count 276, ANC 4.64.    12/09/2024 creatinine 1.2, LFTs unremarkable, TSH 3.6, cortisol 13.9, WBC count 5.4, hemoglobin 14.6, MCV 98, platelet count 278, ANC 4.2.    11/17/24: cr 0.89, alb 3.9, ca 9.4, LFTs WNL, alkphos 126, wbc 4.35, hgb 15.1, plt 2257, ANC 2.86, TSH 3.1129  10/14/24: cr 1.1, alb 3.6, ca 9.6, LFTs WNL, alkphos 147, wbc 6.7, hgb 13.8, plt 216, ANC 5.11, TSH 2.25  09/02/2024 creatinine 1.1, ALK phos 142, ALT 14, , T bili 0.3, WBC count 5.6, hemoglobin 12.0, .5, platelet count 272, ANC 3.86.  09/02/2024 creatinine 1.5, LFTs unremarkable, WBC count 3.2, hemoglobin 8.8, MCV 97.5, platelet count 221.  08/12/24: cr 5.16, alb 3.5, ca 10.4, mag 2.5, phosphorus 4.3, TSH 0.968, cortisol 14.9, wbc 6.20, hgb 8.5, plt 169, ANC 5.07  07/16/24: cr 3.52, alb 2.7, ca 8.9, LFTs WNL, mag 2.0, phosphorus 3.4,  cortisol 19.8, tsh 1.34, wbc 5.23, hgb 9.8, plt 195, ANC 3.86  06/24/24: cr 0.7, alb 3.3, ca 9.4, LFTs WNL, TSH 1.83, mag 2.0, phosphorus 4.1,  wbc 5.3, hgb 13.4, plt 469, cortisol pending   06/02/2024:  Creatinine 0 point 7, LFTs unremarkable, TSH 0.98, WBC count 14.6, hemoglobin 14, MCV 95.4, platelet count 267, cortisol pending.      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       Past Surgical History:   Procedure Laterality Date    CATARACT EXTRACTION W/  INTRAOCULAR LENS IMPLANT Left 06/28/2023    Dr Fritz Cornejo    ENDOBRONCHIAL ULTRASOUND N/A 05/08/2024    Dr Kayden Smart    HERNIA REPAIR  1993    INSERTION OF TUNNELED CENTRAL VENOUS HEMODIALYSIS CATHETER N/A 7/29/2024    Procedure: Insertion, Catheter, Central Venous, Hemodialysis;  Surgeon: Abel Goldstein DO;  Location: [facility] CATH LAB;  Service: Nephrology;  Laterality: N/A;    REMOVAL OF TUNNELED CENTRAL VENOUS CATHETER (CVC) N/A 9/16/2024    Procedure: REMOVAL, CATHETER, CENTRAL VENOUS, TUNNELED;  Surgeon: Abel Goldstein DO;  Location: [facility] CATH LAB;  Service: Nephrology;  Laterality: N/A;       Family History   Problem Relation Name Age of Onset    No Known Problems Mother      No Known Problems Father         Social History     Socioeconomic History    Marital status: Single   Tobacco Use    Smoking status: Former     Current packs/day: 0.25     Types: Cigarettes    Smokeless tobacco: Former   Substance and Sexual Activity    Alcohol use: Not Currently     Comment: 1/2 pint of whiskey daily    Drug use: Not Currently       Current Outpatient  Medications   Medication Sig Dispense Refill    b complex vitamins tablet Take 1 tablet by mouth 3 (three) times daily.      albuterol (PROVENTIL/VENTOLIN HFA) 90 mcg/actuation inhaler Inhale 2 puffs into the lungs every 6 (six) hours as needed for Shortness of Breath. (Patient not taking: Reported on 10/15/2024)      dexAMETHasone (DECADRON) 4 MG Tab Take 2 tablets (8 mg total) by mouth once daily. on days 2-4 of each chemotherapy cycle (Patient not taking: Reported on 10/15/2024) 6 tablet 3    diphenoxylate-atropine 2.5-0.025 mg (LOMOTIL) 2.5-0.025 mg per tablet Take 1 tablet by mouth 4 (four) times daily as needed for Diarrhea. (Patient not taking: Reported on 10/15/2024) 30 tablet 4    folic acid (FOLVITE) 1 MG tablet Take 1 tablet (1 mg total) by mouth once daily. 30 tablet 2    OLANZapine (ZYPREXA) 5 MG tablet Take 1 tablet by mouth nightly on days 1-4 of chemotherapy treatment.  Will only take for cycles 1-4. (Patient not taking: Reported on 10/15/2024) 16 tablet 0    ondansetron (ZOFRAN) 8 MG tablet Take 1 tablet (8 mg total) by mouth every 8 (eight) hours as needed for Nausea. (Patient not taking: Reported on 3/19/2025) 30 tablet 1    pantoprazole (PROTONIX) 40 MG tablet Take 1 tablet (40 mg total) by mouth before breakfast. 30 tablet 2    prochlorperazine (COMPAZINE) 5 MG tablet Take 1 tablet (5 mg total) by mouth every 6 (six) hours as needed for Nausea. (Patient not taking: Reported on 3/19/2025) 30 tablet 3    promethazine (PHENERGAN) 12.5 MG Tab Take 1 tablet (12.5 mg total) by mouth 4 (four) times daily. (Patient not taking: Reported on 3/19/2025) 45 tablet 1     No current facility-administered medications for this visit.       Review of patient's allergies indicates:  No Known Allergies      Review of systems  CONSTITUTIONAL: no fevers, no chills, no weight loss, no fatigue, no weakness  HEMATOLOGIC: no abnormal bleeding, no abnormal bruising, no drenching night sweats  ONCOLOGIC: no new masses or  lumps  HEENT: no vision loss, no tinnitus or hearing loss, no nose bleeding, no dysphagia, no odynophagia  CVS: no chest pain, no palpitations, no dyspnea on exertion  RESP: no shortness of breath, no hemoptysis, no cough  GI: no nausea, no vomiting, no diarrhea, no constipation, no melena, no hematochezia, no hematemesis, no abdominal pain, no increase in abdominal girth  : no dysuria, no hematuria, no hesitancy, no scrotal swelling, no discharge  INTEGUMENT: no rashes, no abnormal bruising, no nail pitting, no hyperpigmentation  NEURO: no falls, no memory loss, no paresthesias or dysesthesias, no urofecal incontinence or retention, no loss of strength on any extremity  MSK: no back pain, no new joint pain, no joint swelling  PSYCH: no suicidal or homicidal ideation, no depression, no insomnia, no anhedonia  ENDOCRINE: no heat or cold intolerance, no polyuria, no polydipsia      Physical Exam:  Vitals:    03/19/25 1329   BP: 132/88   Pulse: 108   Resp: 14   Temp: 98.2 °F (36.8 °C)                 GA: AAOx3, NAD  HEENT: NCAT, moist oral mucous membranes,  CVS: s1s2 RRR, no M/R/G, PICC line in left upper extremity without surrounding edema or erythema  RESP:  Bibasilar crackles, no rhonchi.  Good bilateral air entry.  EXT: no deformities, no pedal edema  SKIN: no rashes, warm and dry  NEURO: normal mentation, strength 5/5 on all 4 extremities, no sensory deficits        Assessment    # Small-cell lung cancer, probable extensive stage possible liver metastases   Reviewed pathology report and imaging studies   Discussed with patient the diagnosis of small-cell lung cancer, the aggressive nature of disease, the questionable liver involvement and likely advanced incurable stage with palliative nature of his treatment  Patient was status post 1st cycle of carbo etoposide while inpatient, cycle 1 day 1 on 05/11/2024  He was referred for port placement however surgery deemed it appropriate for a PICC line given the  concern for SVC syndrome and SVC thrombus.    Patient was agreeable to proceed with palliative systemic chemotherapy with carbo etoposide atezolizumab  Patient had a hospital admission with JESUS in July 2024 and underwent a kidney biopsy following which he was transitioned to hemodialysis with Nephrology  Given that he was currently on hemodialysis, discussed the plan for dose reductions chemotherapy and the plan for chemotherapy cycle 4 day 1 to be done 12-24 hours before his dialysis.  Will discuss patient's chemotherapy dosing with pharmacy as well  Patient reports being off dialysis since late August 2024 as directed by Nephrology   PET-CT in August 2024 with positive response to therapy  Patient transitioned to maintenance atezolizumab on 09/03/2024  PET-CT in December 2024 with stable disease, no evidence of progression.      # SVC thrombus  On Eliquis 5 mg b.i.d.    Plan   Patient had treatment delays since his last cycle of treatment due to logistical issues with transportation   Reviewed labs, proceed with atezolizumab today   Plan for PET-CT prior to next follow-up visit with us to assess treatment response  Plan to follow-up in 3 weeks, labs the day prior to next cycle of treatment.  Continue Eliquis 5 BID  Follow-up with Nephrology as scheduled.    Portions of the record may have been created with voice recognition software. Occasional wrong-word or sound-a-like substitutions may have occurred due to the inherent limitations of voice recognition software.

## 2025-04-14 ENCOUNTER — OFFICE VISIT (OUTPATIENT)
Dept: HEMATOLOGY/ONCOLOGY | Facility: CLINIC | Age: 55
End: 2025-04-14
Payer: MEDICAID

## 2025-04-14 ENCOUNTER — TELEPHONE (OUTPATIENT)
Dept: HEMATOLOGY/ONCOLOGY | Facility: CLINIC | Age: 55
End: 2025-04-14

## 2025-04-14 VITALS
OXYGEN SATURATION: 98 % | SYSTOLIC BLOOD PRESSURE: 123 MMHG | BODY MASS INDEX: 18.32 KG/M2 | DIASTOLIC BLOOD PRESSURE: 78 MMHG | TEMPERATURE: 98 F | RESPIRATION RATE: 14 BRPM | HEIGHT: 66 IN | HEART RATE: 104 BPM | WEIGHT: 114 LBS

## 2025-04-14 DIAGNOSIS — Z79.01 CURRENT USE OF LONG TERM ANTICOAGULATION: ICD-10-CM

## 2025-04-14 DIAGNOSIS — C34.81 SMALL CELL LUNG CANCER, OVERLAPPING SITES OF RIGHT LUNG: ICD-10-CM

## 2025-04-14 DIAGNOSIS — K76.9 LIVER LESION: ICD-10-CM

## 2025-04-14 DIAGNOSIS — G89.3 CANCER RELATED PAIN: Primary | ICD-10-CM

## 2025-04-14 PROCEDURE — 1159F MED LIST DOCD IN RCRD: CPT | Mod: CPTII,,, | Performed by: STUDENT IN AN ORGANIZED HEALTH CARE EDUCATION/TRAINING PROGRAM

## 2025-04-14 PROCEDURE — 3008F BODY MASS INDEX DOCD: CPT | Mod: CPTII,,, | Performed by: STUDENT IN AN ORGANIZED HEALTH CARE EDUCATION/TRAINING PROGRAM

## 2025-04-14 PROCEDURE — 99215 OFFICE O/P EST HI 40 MIN: CPT | Mod: ,,, | Performed by: STUDENT IN AN ORGANIZED HEALTH CARE EDUCATION/TRAINING PROGRAM

## 2025-04-14 PROCEDURE — 3078F DIAST BP <80 MM HG: CPT | Mod: CPTII,,, | Performed by: STUDENT IN AN ORGANIZED HEALTH CARE EDUCATION/TRAINING PROGRAM

## 2025-04-14 PROCEDURE — G2211 COMPLEX E/M VISIT ADD ON: HCPCS | Mod: ,,, | Performed by: STUDENT IN AN ORGANIZED HEALTH CARE EDUCATION/TRAINING PROGRAM

## 2025-04-14 PROCEDURE — 3074F SYST BP LT 130 MM HG: CPT | Mod: CPTII,,, | Performed by: STUDENT IN AN ORGANIZED HEALTH CARE EDUCATION/TRAINING PROGRAM

## 2025-04-14 RX ORDER — OXYCODONE AND ACETAMINOPHEN 5; 325 MG/1; MG/1
1 TABLET ORAL EVERY 6 HOURS PRN
Qty: 30 TABLET | Refills: 0 | Status: SHIPPED | OUTPATIENT
Start: 2025-04-14

## 2025-04-14 NOTE — PROGRESS NOTES
Referring provider:  Dr. Campbell    Reason for consultation:  Small-cell lung cancer      Diagnosis:  Small-cell lung cancer, extensive stage.      Current treatment:    04/24/2025-current:  Plan to initiate lurbinectedin    Treatment history:      09/03/2024-03/19/25:  Atezolizumab  05/11/2024-08/12/2024:  Carbo etoposide atezolizumab, atezolizumab was omitted on 1st cycle due to being inpatient.  Dates unknown:  Concurrent radiation therapy         HPI:    Patient is a 54-year-old male with history of alcohol use presented to the [facility] with symptoms of left neck swelling and change in speech.  In the ER he would soft tissue neck CT done which revealed generalized edema throughout the soft tissue as well as right jugular vein engorgement and a large lung mass in the right lung apex concerning for malignancy.  Patient was started on Lovenox 1 milligram/kg b.i.d. 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  A CT of the abdomen and pelvis on 05/06/2024 revealed questionable metastases to the liver.  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  Patient received cycle 1 of carbo etoposide on 05/11/2024 while inpatient at [facility].  He was referred to our clinic for outpatient care and further treatment management.  He presented to clinic on 05/24/2024 to establish care.    Patient reports history of smoking, half pack per day, 6 pack of beer daily, prior history of recreational drug use.  Last use 5 years back.  He lives with a friend.  He works offshore on oil field.  Denies family history of malignancy.    He was [place] in July [place]24 for JESUS after cycle 3 of chemotherapy thought to be secondary to nephrotoxic ATN versus acute interstitial nephritis and underwent a kidney biopsy.   he was on hemodialysis 3 times weekly with Nephrology which was stopped in late August 2024      Interval history:    Today, 04/14/25, patient reports symptoms of right chest pain as well as right back pain which is new from prior.  He denies any worsening shortness of breath.  He denies any headaches, vision changes or focal weakness.  He denies any new medications, ER or hospital visits since his last follow-up with us.    Pathology     05/10/2024 for our pulmonary lymph node biopsy small-cell carcinoma, grade 3 neuroendocrine carcinoma with extensive necrosis.  Lymph node pulmonary 7 L EBUS biopsy small-cell carcinoma, with extensive necrosis.      Imaging     04/04/2025 PET-CT:  Increased consolidative opacities containing air bronchograms in the right lung with mild-to-moderate uptake, possibly representing posttreatment inflammation, although the relative contribution of metabolically active tumor is difficult to determine.  Interval development of hypermetabolic pleural-based opacities in the right hemithorax, representing metastatic disease.  New hypermetabolic retroesophageal lymph node, also likely metastatic.  New hypermetabolism at C7, concerning for metabolically active disease.  Additional osteoblastic metastatic lesions remain metabolically quiescent.  Nonspecific stomach and bowel uptake      12/06/2024 PET-CT:  No significant change in size or metabolically activity of the cavitary mass in periphery of the right upper lobe.  Stable metabolically Haim osseous metastases.  Increased patchy reticular and ground-glass opacities in the right lung with mild uptake likely inflammatory in etiology.  Continued monitoring is recommended.  Physiological brown fat uptake      08/26/2024 PET-CT:  Cavitary masslike opacity in the periphery of the right upper lobe with moderate uptake in keeping with the patient was known cancer.  Patchy ground-glass and reticular densities in the right upper lobe with mild  uptake likely represents postradiation pneumonitis.  However continued monitoring is recommended.  Nonspecific mild uptake in the small precarinal lymph node that she will be monitored.  Multiple osteoblastic lesions without abnormal uptake, possibly representing treated metastases.  The cavitary lesion measures 3.8 x 3 cm with an SUV of 4.7.    06/10/24 NM Bone scan: There are multiple areas of increased uptake within the thoracic and lumbar region possible related to arthritis, however, a metastatic disease must be considered.     05/07/2024 MRI brain with and without contrast:  No obvious metastatic disease seen however large areas of cerebral convexity specifically on the right side and to a lesser extent on the left side are not well evaluated on this examination due to artifact.    05/06/24 CT Chest w/ contrast: Large mass in right lung apex right hilum and mediastinum measuring 11 cm X 9 cm X 7.2 cm causing obstruction of the superior vena cava at the junction of the brachiocephalic veins.     05/06/24 CT soft tissue neck w/ contrast- BINTA  05/06/24 CT Head/Brain w/ contrast: - BINTA  05/06/2024 CT abdomen pelvis with IV contrast:  Nonspecific hypodense lesion is identified in the liver may represent metastatic disease.  Other secondary findings as noted above.  The liver mass is 9 mm in size.      Laboratory     04/14/2025:  CMP unremarkable, TSH 3.4, CBC unremarkable    03/18/2025 creatinine 1.0, total bili 0.1, LFTs unremarkable, TSH 2.6, alkaline phosphatase 153, WBC count 5.8, hemoglobin 14.4, MCV 1 1.9, platelet count 207, ANC> 1000, cortisol pending.    2/3/25 creatinine 1.0, LFTs unremarkable, ALK phos 135, TSH 3.52, cortisol pending, WBC count 6.3, hemoglobin 13.7, MCV 99.3, platelet count 276, ANC 4.64.    12/09/2024 creatinine 1.2, LFTs unremarkable, TSH 3.6, cortisol 13.9, WBC count 5.4, hemoglobin 14.6, MCV 98, platelet count 278, ANC 4.2.    11/17/24: cr 0.89, alb 3.9, ca 9.4, LFTs WNL, alkphos  126, wbc 4.35, hgb 15.1, plt 2257, ANC 2.86, TSH 3.1129  10/14/24: cr 1.1, alb 3.6, ca 9.6, LFTs WNL, alkphos 147, wbc 6.7, hgb 13.8, plt 216, ANC 5.11, TSH 2.25  09/02/2024 creatinine 1.1, ALK phos 142, ALT 14, , T bili 0.3, WBC count 5.6, hemoglobin 12.0, .5, platelet count 272, ANC 3.86.  09/02/2024 creatinine 1.5, LFTs unremarkable, WBC count 3.2, hemoglobin 8.8, MCV 97.5, platelet count 221.  08/12/24: cr 5.16, alb 3.5, ca 10.4, mag 2.5, phosphorus 4.3, TSH 0.968, cortisol 14.9, wbc 6.20, hgb 8.5, plt 169, ANC 5.07  07/16/24: cr 3.52, alb 2.7, ca 8.9, LFTs WNL, mag 2.0, phosphorus 3.4, cortisol 19.8, tsh 1.34, wbc 5.23, hgb 9.8, plt 195, ANC 3.86  06/24/24: cr 0.7, alb 3.3, ca 9.4, LFTs WNL, TSH 1.83, mag 2.0, phosphorus 4.1,  wbc 5.3, hgb 13.4, plt 469, cortisol pending   06/02/2024:  Creatinine 0 point 7, LFTs unremarkable, TSH 0.98, WBC count 14.6, hemoglobin 14, MCV 95.4, platelet count 267, cortisol pending.      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       Past Surgical History:   Procedure Laterality Date    CATARACT EXTRACTION W/  INTRAOCULAR LENS IMPLANT Left 06/28/2023    Dr Fritz Cornejo    ENDOBRONCHIAL ULTRASOUND N/A 05/08/2024    Dr Kayden Smart    HERNIA REPAIR  1993    INSERTION OF TUNNELED CENTRAL VENOUS HEMODIALYSIS CATHETER N/A 7/29/2024    Procedure: Insertion, Catheter, Central Venous, Hemodialysis;  Surgeon: Abel Goldstein DO;  Location: [facility] CATH LAB;  Service: Nephrology;  Laterality: N/A;    REMOVAL OF TUNNELED CENTRAL VENOUS CATHETER (CVC) N/A 9/16/2024     Procedure: REMOVAL, CATHETER, CENTRAL VENOUS, TUNNELED;  Surgeon: Abel Goldstein DO;  Location[facility] C[facility] LAB;  Service: Nephrology;  Laterality: N/A;       Family History   Problem Relation Name Age of Onset    No Known Problems Mother      No Known Problems Father         Social History     Socioeconomic History    Marital status: Single   Tobacco Use    Smoking status: Former     Current packs/day: 0.25     Types: Cigarettes    Smokeless tobacco: Former   Substance and Sexual Activity    Alcohol use: Not Currently     Comment: 1/2 pint of whiskey daily    Drug use: Not Currently       Current Outpatient Medications   Medication Sig Dispense Refill    albuterol (PROVENTIL/VENTOLIN HFA) 90 mcg/actuation inhaler Inhale 2 puffs into the lungs every 6 (six) hours as needed for Shortness of Breath. (Patient not taking: Reported on 4/14/2025)      b complex vitamins tablet Take 1 tablet by mouth 3 (three) times daily. (Patient not taking: Reported on 4/14/2025)      dexAMETHasone (DECADRON) 4 MG Tab Take 2 tablets (8 mg total) by mouth once daily. on days 2-4 of each chemotherapy cycle (Patient not taking: Reported on 4/14/2025) 6 tablet 3    diphenoxylate-atropine 2.5-0.025 mg (LOMOTIL) 2.5-0.025 mg per tablet Take 1 tablet by mouth 4 (four) times daily as needed for Diarrhea. (Patient not taking: Reported on 4/14/2025) 30 tablet 4    folic acid (FOLVITE) 1 MG tablet Take 1 tablet (1 mg total) by mouth once daily. 30 tablet 2    OLANZapine (ZYPREXA) 5 MG tablet Take 1 tablet by mouth nightly on days 1-4 of chemotherapy treatment.  Will only take for cycles 1-4. (Patient not taking: Reported on 4/14/2025) 16 tablet 0    ondansetron (ZOFRAN) 8 MG tablet Take 1 tablet (8 mg total) by mouth every 8 (eight) hours as needed for Nausea. (Patient not taking: Reported on 2/4/2025) 30 tablet 1    pantoprazole (PROTONIX) 40 MG tablet Take 1 tablet (40 mg total) by mouth before breakfast. 30 tablet 2    prochlorperazine  (COMPAZINE) 5 MG tablet Take 1 tablet (5 mg total) by mouth every 6 (six) hours as needed for Nausea. (Patient not taking: Reported on 2/4/2025) 30 tablet 3    promethazine (PHENERGAN) 12.5 MG Tab Take 1 tablet (12.5 mg total) by mouth 4 (four) times daily. (Patient not taking: Reported on 2/4/2025) 45 tablet 1     No current facility-administered medications for this visit.       Review of patient's allergies indicates:  No Known Allergies      Review of systems  CONSTITUTIONAL: no fevers, no chills, no weight loss, no fatigue, no weakness  HEMATOLOGIC: no abnormal bleeding, no abnormal bruising, no drenching night sweats  ONCOLOGIC: no new masses or lumps  HEENT: no vision loss, no tinnitus or hearing loss, no nose bleeding, no dysphagia, no odynophagia  CVS: no chest pain, no palpitations, no dyspnea on exertion  RESP: no shortness of breath, no hemoptysis, no cough  GI: no nausea, no vomiting, no diarrhea, no constipation, no melena, no hematochezia, no hematemesis, no abdominal pain, no increase in abdominal girth  : no dysuria, no hematuria, no hesitancy, no scrotal swelling, no discharge  INTEGUMENT: no rashes, no abnormal bruising, no nail pitting, no hyperpigmentation  NEURO: no falls, no memory loss, no paresthesias or dysesthesias, no urofecal incontinence or retention, no loss of strength on any extremity  MSK: no back pain, no new joint pain, no joint swelling  PSYCH: no suicidal or homicidal ideation, no depression, no insomnia, no anhedonia  ENDOCRINE: no heat or cold intolerance, no polyuria, no polydipsia      Physical Exam:  Vitals:    04/14/25 0833   BP: 123/78   Pulse: 104   Resp: 14   Temp: 98 °F (36.7 °C)                 GA: AAOx3, NAD  HEENT: NCAT, moist oral mucous membranes,  CVS: s1s2 RRR, no M/R/G, PICC line in left upper extremity without surrounding edema or erythema  RESP:  Bibasilar crackles, no rhonchi.  Good bilateral air entry.  EXT: no deformities, no pedal edema  SKIN: no  rashes, warm and dry  NEURO: normal mentation, strength 5/5 on all 4 extremities, no sensory deficits        Assessment    # Small-cell lung cancer, probable extensive stage possible liver metastases   Reviewed pathology report and imaging studies   Discussed with patient the diagnosis of small-cell lung cancer, the aggressive nature of disease, the questionable liver involvement and likely advanced incurable stage with palliative nature of his treatment  Patient was status post 1st cycle of carbo etoposide while inpatient, cycle 1 day 1 on 05/11/2024  He was referred for port placement however surgery deemed it appropriate for a PICC line given the concern for SVC syndrome and SVC thrombus.    Patient was agreeable to proceed with palliative systemic chemotherapy with carbo etoposide atezolizumab  Patient had a hospital admission with JESUS in July 2024 and underwent a kidney biopsy following which he was transitioned to hemodialysis with Nephrology  Given that he was currently on hemodialysis, discussed the plan for dose reductions chemotherapy and the plan for chemotherapy cycle 4 day 1 to be done 12-24 hours before his dialysis.  Will discuss patient's chemotherapy dosing with pharmacy as well  Patient reports being off dialysis since late August 2024 as directed by Nephrology   PET-CT in August 2024 with positive response to therapy  Patient transitioned to maintenance atezolizumab on 09/03/2024  PET-CT in December 2024 with stable disease, no evidence of progression.    PET-CT in April 2025 with findings concerning for progression of disease  Discussed with patient the need to transitioned to second-line therapy given progression of disease on above PET-CT.  Discussed the options for treatment at [place] with by specific antibody, patient will not be able to travel to [place] due to logistical issues.  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    # cancer-related pain   Will initiate treatment with Percocet 5-325 q.6 hours PRN    Plan   Plan to initiate  lurbinectedin in in second-line asap  Port placement with surgery urgently   Radiation oncology referral for possible RT to C7  Chemo education  Oxycodone 5-325 q.6 hours p.r.n. sent to pharmacy today   Continue Eliquis 5 BID  Plan to follow-up prior to cycle 1 day 1, labs the day prior  Follow-up with Nephrology as scheduled.    Portions of the record may have been created with voice recognition software. Occasional wrong-word or sound-a-like substitutions may have occurred due to the inherent limitations of voice recognition software.

## 2025-04-14 NOTE — TELEPHONE ENCOUNTER
Per Dr. Hess office Dr. Hess is still unable to do mediport to chest. States can do leg Mediport but will not be done till 5/1/25. Per Dr. Arriola Pt needs to start treatment on 4/24/25 so orders placed for PICC Line and Pt unable to maintain PICC Line d/t transportation and cost of flushes so will keep scheduled appt for Mediport to leg also. Dr. Oconnor office notified.--SC, NATALEEN

## 2025-04-16 ENCOUNTER — OFFICE VISIT (OUTPATIENT)
Dept: HEMATOLOGY/ONCOLOGY | Facility: CLINIC | Age: 55
End: 2025-04-16
Payer: MEDICAID

## 2025-04-16 VITALS
BODY MASS INDEX: 18.74 KG/M2 | HEIGHT: 66 IN | HEART RATE: 110 BPM | RESPIRATION RATE: 16 BRPM | OXYGEN SATURATION: 100 % | TEMPERATURE: 98 F | SYSTOLIC BLOOD PRESSURE: 108 MMHG | DIASTOLIC BLOOD PRESSURE: 75 MMHG | WEIGHT: 116.63 LBS

## 2025-04-16 DIAGNOSIS — T45.1X5A CHEMOTHERAPY-INDUCED NAUSEA: ICD-10-CM

## 2025-04-16 DIAGNOSIS — R11.0 CHEMOTHERAPY-INDUCED NAUSEA: ICD-10-CM

## 2025-04-16 DIAGNOSIS — R19.7 DIARRHEA, UNSPECIFIED TYPE: ICD-10-CM

## 2025-04-16 PROCEDURE — 3078F DIAST BP <80 MM HG: CPT | Mod: CPTII,,,

## 2025-04-16 PROCEDURE — 3074F SYST BP LT 130 MM HG: CPT | Mod: CPTII,,,

## 2025-04-16 PROCEDURE — 99214 OFFICE O/P EST MOD 30 MIN: CPT | Mod: ,,,

## 2025-04-16 PROCEDURE — 3008F BODY MASS INDEX DOCD: CPT | Mod: CPTII,,,

## 2025-04-16 RX ORDER — DIPHENOXYLATE HYDROCHLORIDE AND ATROPINE SULFATE 2.5; .025 MG/1; MG/1
1 TABLET ORAL 4 TIMES DAILY PRN
Qty: 30 TABLET | Refills: 4 | Status: SHIPPED | OUTPATIENT
Start: 2025-04-16 | End: 2026-04-16

## 2025-04-16 RX ORDER — ONDANSETRON HYDROCHLORIDE 8 MG/1
8 TABLET, FILM COATED ORAL EVERY 8 HOURS PRN
Qty: 30 TABLET | Refills: 1 | Status: SHIPPED | OUTPATIENT
Start: 2025-04-16

## 2025-04-16 NOTE — PROGRESS NOTES
Referring provider:  Dr. Campbell    Reason for consultation:  Small-cell lung cancer      Diagnosis:  Small-cell lung cancer, extensive stage.      Current treatment:    04/24/2025-current:  Plan to initiate lurbinectedin    Treatment history:      09/03/2024-03/19/25:  Atezolizumab  05/11/2024-08/12/2024:  Carbo etoposide atezolizumab, atezolizumab was omitted on 1st cycle due to being inpatient.  Dates unknown:  Concurrent radiation therapy         HPI:    Patient is a 54-year-old male with history of alcohol use presented to the [facility] with symptoms of left neck swelling and change in speech.  In the ER he would soft tissue neck CT done which revealed generalized edema throughout the soft tissue as well as right jugular vein engorgement and a large lung mass in the right lung apex concerning for malignancy.  Patient was started on Lovenox 1 milligram/kg b.i.d. 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  A CT of the abdomen and pelvis on 05/06/2024 revealed questionable metastases to the liver.  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  Patient received cycle 1 of carbo etoposide on 05/11/2024 while inpatient at [facility].  He was referred to our clinic for outpatient care and further treatment management.  He presented to clinic on 05/24/2024 to establish care.    Patient reports history of smoking, half pack per day, 6 pack of beer daily, prior history of recreational drug use.  Last use 5 years back.  He lives with a friend.  He works offshore on oil field.  Denies family history of malignancy.    He was hospitalized at [facility] in July 2024 for JESUS after cycle 3 of chemotherapy thought to be secondary to nephrotoxic ATN versus acute interstitial nephritis and underwent a kidney biopsy.   he was on hemodialysis 3 times weekly with Nephrology which was stopped in late August 2024      Interval history:    Today, 04/16/25, patient returns for chemotherapy education.  He continues with back pain and was prescribed flexeril which has helped.  He denies any headaches, vision changes or focal weakness.  He denies any new medications, ER or hospital visits since his last follow-up with us.    Pathology     05/10/2024 for our pulmonary lymph node biopsy small-cell carcinoma, grade 3 neuroendocrine carcinoma with extensive necrosis.  Lymph node pulmonary 7 L EBUS biopsy small-cell carcinoma, with extensive necrosis.      Imaging     04/04/2025 PET-CT:  Increased consolidative opacities containing air bronchograms in the right lung with mild-to-moderate uptake, possibly representing posttreatment inflammation, although the relative contribution of metabolically active tumor is difficult to determine.  Interval development of hypermetabolic pleural-based opacities in the right hemithorax, representing metastatic disease.  New hypermetabolic retroesophageal lymph node, also likely metastatic.  New hypermetabolism at C7, concerning for metabolically active disease.  Additional osteoblastic metastatic lesions remain metabolically quiescent.  Nonspecific stomach and bowel uptake      12/06/2024 PET-CT:  No significant change in size or metabolically activity of the cavitary mass in periphery of the right upper lobe.  Stable metabolically Haim osseous metastases.  Increased patchy reticular and ground-glass opacities in the right lung with mild uptake likely inflammatory in etiology.  Continued monitoring is recommended.  Physiological brown fat uptake      08/26/2024 PET-CT:  Cavitary masslike opacity in the periphery of the right upper lobe with moderate uptake in keeping with the patient was known cancer.  Patchy ground-glass and reticular densities in the right upper lobe with mild uptake likely represents  postradiation pneumonitis.  However continued monitoring is recommended.  Nonspecific mild uptake in the small precarinal lymph node that she will be monitored.  Multiple osteoblastic lesions without abnormal uptake, possibly representing treated metastases.  The cavitary lesion measures 3.8 x 3 cm with an SUV of 4.7.    06/10/24 NM Bone scan: There are multiple areas of increased uptake within the thoracic and lumbar region possible related to arthritis, however, a metastatic disease must be considered.     05/07/2024 MRI brain with and without contrast:  No obvious metastatic disease seen however large areas of cerebral convexity specifically on the right side and to a lesser extent on the left side are not well evaluated on this examination due to artifact.    05/06/24 CT Chest w/ contrast: Large mass in right lung apex right hilum and mediastinum measuring 11 cm X 9 cm X 7.2 cm causing obstruction of the superior vena cava at the junction of the brachiocephalic veins.     05/06/24 CT soft tissue neck w/ contrast- BINTA  05/06/24 CT Head/Brain w/ contrast: - BINTA  05/06/2024 CT abdomen pelvis with IV contrast:  Nonspecific hypodense lesion is identified in the liver may represent metastatic disease.  Other secondary findings as noted above.  The liver mass is 9 mm in size.      Laboratory     04/14/2025:  CMP unremarkable, TSH 3.4, CBC unremarkable    03/18/2025 creatinine 1.0, total bili 0.1, LFTs unremarkable, TSH 2.6, alkaline phosphatase 153, WBC count 5.8, hemoglobin 14.4, MCV 1 1.9, platelet count 207, ANC> 1000, cortisol pending.    2/3/25 creatinine 1.0, LFTs unremarkable, ALK phos 135, TSH 3.52, cortisol pending, WBC count 6.3, hemoglobin 13.7, MCV 99.3, platelet count 276, ANC 4.64.    12/09/2024 creatinine 1.2, LFTs unremarkable, TSH 3.6, cortisol 13.9, WBC count 5.4, hemoglobin 14.6, MCV 98, platelet count 278, ANC 4.2.    11/17/24: cr 0.89, alb 3.9, ca 9.4, LFTs WNL, alkphos 126, wbc 4.35, hgb 15.1, plt  2257, ANC 2.86, TSH 3.1129  10/14/24: cr 1.1, alb 3.6, ca 9.6, LFTs WNL, alkphos 147, wbc 6.7, hgb 13.8, plt 216, ANC 5.11, TSH 2.25  09/02/2024 creatinine 1.1, ALK phos 142, ALT 14, , T bili 0.3, WBC count 5.6, hemoglobin 12.0, .5, platelet count 272, ANC 3.86.  09/02/2024 creatinine 1.5, LFTs unremarkable, WBC count 3.2, hemoglobin 8.8, MCV 97.5, platelet count 221.  08/12/24: cr 5.16, alb 3.5, ca 10.4, mag 2.5, phosphorus 4.3, TSH 0.968, cortisol 14.9, wbc 6.20, hgb 8.5, plt 169, ANC 5.07  07/16/24: cr 3.52, alb 2.7, ca 8.9, LFTs WNL, mag 2.0, phosphorus 3.4, cortisol 19.8, tsh 1.34, wbc 5.23, hgb 9.8, plt 195, ANC 3.86  06/24/24: cr 0.7, alb 3.3, ca 9.4, LFTs WNL, TSH 1.83, mag 2.0, phosphorus 4.1,  wbc 5.3, hgb 13.4, plt 469, cortisol pending   06/02/2024:  Creatinine 0 point 7, LFTs unremarkable, TSH 0.98, WBC count 14.6, hemoglobin 14, MCV 95.4, platelet count 267, cortisol pending.      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       Past Surgical History:   Procedure Laterality Date    CATARACT EXTRACTION W/  INTRAOCULAR LENS IMPLANT Left 06/28/2023    Dr Fritz Cornejo    ENDOBRONCHIAL ULTRASOUND N/A 05/08/2024    Dr Kayden Smart    HERNIA REPAIR  1993    INSERTION OF TUNNELED CENTRAL VENOUS HEMODIALYSIS CATHETER N/A 7/29/2024    Procedure: Insertion, Catheter, Central Venous, Hemodialysis;  Surgeon: Abel Goldstein DO;  Location: [facility] CATH LAB;  Service: Nephrology;  Laterality: N/A;    REMOVAL OF TUNNELED CENTRAL VENOUS CATHETER (CVC) N/A 9/16/2024    Procedure: REMOVAL, CATHETER,  CENTRAL VENOUS, TUNNELED;  Surgeon: Abel Goldstein DO;  Location: [facility] CATH LAB;  Service: Nephrology;  Laterality: N/A;       Family History   Problem Relation Name Age of Onset    No Known Problems Mother      No Known Problems Father         Social History     Socioeconomic History    Marital status: Single   Tobacco Use    Smoking status: Former     Current packs/day: 0.25     Types: Cigarettes    Smokeless tobacco: Former   Substance and Sexual Activity    Alcohol use: Not Currently     Comment: 1/2 pint of whiskey daily    Drug use: Not Currently       Current Outpatient Medications   Medication Sig Dispense Refill    oxyCODONE-acetaminophen (PERCOCET) 5-325 mg per tablet Take 1 tablet by mouth every 6 (six) hours as needed for Pain. 30 tablet 0    albuterol (PROVENTIL/VENTOLIN HFA) 90 mcg/actuation inhaler Inhale 2 puffs into the lungs every 6 (six) hours as needed for Shortness of Breath. (Patient not taking: Reported on 10/15/2024)      b complex vitamins tablet Take 1 tablet by mouth 3 (three) times daily. (Patient not taking: Reported on 4/16/2025)      diphenoxylate-atropine 2.5-0.025 mg (LOMOTIL) 2.5-0.025 mg per tablet Take 1 tablet by mouth 4 (four) times daily as needed for Diarrhea. (Patient not taking: Reported on 10/15/2024) 30 tablet 4    folic acid (FOLVITE) 1 MG tablet Take 1 tablet (1 mg total) by mouth once daily. 30 tablet 2    OLANZapine (ZYPREXA) 5 MG tablet Take 1 tablet by mouth nightly on days 1-4 of chemotherapy treatment.  Will only take for cycles 1-4. (Patient not taking: Reported on 10/15/2024) 16 tablet 0    ondansetron (ZOFRAN) 8 MG tablet Take 1 tablet (8 mg total) by mouth every 8 (eight) hours as needed for Nausea. (Patient not taking: Reported on 4/16/2025) 30 tablet 1    pantoprazole (PROTONIX) 40 MG tablet Take 1 tablet (40 mg total) by mouth before breakfast. 30 tablet 2    prochlorperazine (COMPAZINE) 5 MG tablet Take 1 tablet (5 mg total) by mouth every 6 (six) hours as  needed for Nausea. (Patient not taking: Reported on 4/16/2025) 30 tablet 3    promethazine (PHENERGAN) 12.5 MG Tab Take 1 tablet (12.5 mg total) by mouth 4 (four) times daily. (Patient not taking: Reported on 4/16/2025) 45 tablet 1     No current facility-administered medications for this visit.       Review of patient's allergies indicates:  No Known Allergies      Review of systems  CONSTITUTIONAL: no fevers, no chills, no weight loss, no fatigue, no weakness  HEMATOLOGIC: no abnormal bleeding, no abnormal bruising, no drenching night sweats  ONCOLOGIC: no new masses or lumps  HEENT: no vision loss, no tinnitus or hearing loss, no nose bleeding, no dysphagia, no odynophagia  CVS: no chest pain, no palpitations, no dyspnea on exertion  RESP: no shortness of breath, no hemoptysis, no cough  GI: no nausea, no vomiting, no diarrhea, no constipation, no melena, no hematochezia, no hematemesis, no abdominal pain, no increase in abdominal girth  : no dysuria, no hematuria, no hesitancy, no scrotal swelling, no discharge  INTEGUMENT: no rashes, no abnormal bruising, no nail pitting, no hyperpigmentation  NEURO: no falls, no memory loss, no paresthesias or dysesthesias, no urofecal incontinence or retention, no loss of strength on any extremity  MSK: no back pain, no new joint pain, no joint swelling  PSYCH: no suicidal or homicidal ideation, no depression, no insomnia, no anhedonia  ENDOCRINE: no heat or cold intolerance, no polyuria, no polydipsia      Physical Exam:  Vitals:    04/16/25 1018   BP: 108/75   Pulse: 110   Resp: 16   Temp: 98 °F (36.7 °C)                 GA: AAOx3, NAD  HEENT: NCAT, moist oral mucous membranes,  CVS: s1s2 RRR, no M/R/G, PICC line in left upper extremity without surrounding edema or erythema  RESP:  Bibasilar crackles, no rhonchi.  Good bilateral air entry.  EXT: no deformities, no pedal edema  SKIN: no rashes, warm and dry  NEURO: normal mentation, strength 5/5 on all 4 extremities, no  sensory deficits        Assessment    # Small-cell lung cancer, probable extensive stage possible liver metastases   Reviewed pathology report and imaging studies   Discussed with patient the diagnosis of small-cell lung cancer, the aggressive nature of disease, the questionable liver involvement and likely advanced incurable stage with palliative nature of his treatment  Patient was status post 1st cycle of carbo etoposide while inpatient, cycle 1 day 1 on 05/11/2024  He was referred for port placement however surgery deemed it appropriate for a PICC line given the concern for SVC syndrome and SVC thrombus.    Patient was agreeable to proceed with palliative systemic chemotherapy with carbo etoposide atezolizumab  Patient had a hospital admission with JESUS in July 2024 and underwent a kidney biopsy following which he was transitioned to hemodialysis with Nephrology  Given that he was currently on hemodialysis, discussed the plan for dose reductions chemotherapy and the plan for chemotherapy cycle 4 day 1 to be done 12-24 hours before his dialysis.  Will discuss patient's chemotherapy dosing with pharmacy as well  Patient reports being off dialysis since late August 2024 as directed by Nephrology   PET-CT in August 2024 with positive response to therapy  Patient transitioned to maintenance atezolizumab on 09/03/2024  PET-CT in December 2024 with stable disease, no evidence of progression.    PET-CT in April 2025 with findings concerning for progression of disease  Discussed with patient the need to transitioned to second-line therapy given progression of disease on above PET-CT.  Discussed the options for treatment at [place] with by specific antibody, patient will not be able to travel to [place] due to logistical issues.  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    # cancer-related pain   Will initiate treatment with Percocet 5-325 q.6 hours PRN    Plan   Plan to initiate lurbinectedin in in second-line asap  Port placement scheduled 5/1/2025, patient will receive a PICC line to begin chemotherapy until MP surgery  Follow-up with Radiation oncology today  Chemo education done today  Continue with Oxycodone 5-325 q.6 hours p.r.n.  Continue Eliquis 5 BID  Plan to follow-up prior to cycle 1 day 1, labs the day prior  Follow-up with Nephrology as scheduled.       Chemotherapy Education, 04/16/2025     The patient is here today for chemotherapy education. We discussed the side effects of chemotherapy, how to manage the side effects, dosage, timing, and when to notify clinic of adverse side effects. Chemotherapy teaching information was given to patient. The patient was given the 24-hour clinic number to report any adverse side effects or emergent problems from treatment/disease.  Patient verbalized understanding related to the risks and benefits of this treatment.  All questions were answered.  Informed consent signed.         DISCUSSION:    1.  A total of 30 minutes was spent on reviewing chemotherapy educational material. At today's therapy teaching session, we discussed the patient's cancer diagnosis as well as planned therapy regimen, protocol, side effects and toxicities.  A handout of each therapeutic agent in the regimen was provided and reviewed in detail.    2.  The following side effects were discussed but not limited to:                a.  Discussed the risk of infection while on therapy related to pancytopenia, specifically a decrease in their white blood cell count.  Instructed to contact our office for temperature >100.4 F, chills, sudden onset cough or shortness of breath, symptoms of a urinary tract infection.                b.  Discussed the risk of anemia. Instructed to contact our office for  dizziness, heart palpitations, or extreme or sudden changes in weakness.                c.  Discussed the risk of thrombocytopenia, which increases the risk of bruising or bleeding.  Instructed the patient to contact our office for spontaneous signs of bleeding, including nose bleeds, bleeding from the gums or mouth, blood in sputum, urine or stool and unusual or excessive bruising or rash.                d.  Discussed GI side effects including weight changes, changes in appetite, altered sense of taste, stomatitis, nausea, vomiting, diarrhea, constipation, and heartburn.                e.  Discussed  side effects including painful urination, changes in the amount of urination, possible urine color changes.  Discussed fertility issues and to prevent  pregnancy if of child bearing age.                f.  Discussed neurological side effects including the risk of peripheral neuropathy, either temporary or permanent.                g.  Discussed the potential for skin, hair, and nail changes.       3.  Instructed to contact our office for discussion of medication changes, the addition of vitamin and/or herbal supplementation as they may interact with some chemotherapy agents.    4.  Discussed dietary modifications and the need to maintain adequate caloric intake and proper oral hydration.  Recommended 64 ounces of fluid per day.    5.  Discussed anti-emetic protocol and bowel regimen protocol.    6.  Office contact information given including after hours number.  Discussed there is an oncologist on call 24/7, 365 days including weekends.  Provided primary nurse's information .    7.  In summary, the patient is in agreement with the plan of care.  Questions appeared to be answered to their satisfaction. Consented the patient to the treatment plan and the patient was educated on the planned duration of the treatment and schedule of the treatment administration. Copy to be scanned into the chart.     All questions  answered to the satisfaction of the patient and family.      Follow up appointments given to patient.       The patient is in agreement with today's plan of care.  All questions answered.  Patient is aware to contact our office for any problems or concerns prior to next visit.

## 2025-04-24 ENCOUNTER — OFFICE VISIT (OUTPATIENT)
Dept: HEMATOLOGY/ONCOLOGY | Facility: CLINIC | Age: 55
End: 2025-04-24
Payer: MEDICAID

## 2025-04-24 VITALS
HEIGHT: 66 IN | WEIGHT: 113.63 LBS | BODY MASS INDEX: 18.26 KG/M2 | HEART RATE: 99 BPM | SYSTOLIC BLOOD PRESSURE: 119 MMHG | RESPIRATION RATE: 14 BRPM | OXYGEN SATURATION: 99 % | TEMPERATURE: 98 F | DIASTOLIC BLOOD PRESSURE: 85 MMHG

## 2025-04-24 DIAGNOSIS — G89.3 CANCER RELATED PAIN: ICD-10-CM

## 2025-04-24 DIAGNOSIS — I87.1 SUPERIOR VENA CAVA SYNDROME: ICD-10-CM

## 2025-04-24 DIAGNOSIS — C79.51 SECONDARY MALIGNANT NEOPLASM OF BONE: ICD-10-CM

## 2025-04-24 DIAGNOSIS — C34.81 SMALL CELL LUNG CANCER, OVERLAPPING SITES OF RIGHT LUNG: Primary | ICD-10-CM

## 2025-04-24 PROCEDURE — G2211 COMPLEX E/M VISIT ADD ON: HCPCS | Mod: ,,, | Performed by: STUDENT IN AN ORGANIZED HEALTH CARE EDUCATION/TRAINING PROGRAM

## 2025-04-24 PROCEDURE — 1159F MED LIST DOCD IN RCRD: CPT | Mod: CPTII,,, | Performed by: STUDENT IN AN ORGANIZED HEALTH CARE EDUCATION/TRAINING PROGRAM

## 2025-04-24 PROCEDURE — 3079F DIAST BP 80-89 MM HG: CPT | Mod: CPTII,,, | Performed by: STUDENT IN AN ORGANIZED HEALTH CARE EDUCATION/TRAINING PROGRAM

## 2025-04-24 PROCEDURE — 3008F BODY MASS INDEX DOCD: CPT | Mod: CPTII,,, | Performed by: STUDENT IN AN ORGANIZED HEALTH CARE EDUCATION/TRAINING PROGRAM

## 2025-04-24 PROCEDURE — 3074F SYST BP LT 130 MM HG: CPT | Mod: CPTII,,, | Performed by: STUDENT IN AN ORGANIZED HEALTH CARE EDUCATION/TRAINING PROGRAM

## 2025-04-24 PROCEDURE — 99215 OFFICE O/P EST HI 40 MIN: CPT | Mod: ,,, | Performed by: STUDENT IN AN ORGANIZED HEALTH CARE EDUCATION/TRAINING PROGRAM

## 2025-04-24 RX ORDER — HEPARIN 100 UNIT/ML
500 SYRINGE INTRAVENOUS
Status: CANCELLED | OUTPATIENT
Start: 2025-04-24

## 2025-04-24 RX ORDER — SODIUM CHLORIDE 0.9 % (FLUSH) 0.9 %
10 SYRINGE (ML) INJECTION
Status: CANCELLED | OUTPATIENT
Start: 2025-04-24

## 2025-04-24 NOTE — PROGRESS NOTES
Referring provider:  Dr. Campbell    Reason for consultation:  Small-cell lung cancer      Diagnosis:  Small-cell lung cancer, extensive stage.      Current treatment:    04/24/2025-current:  lurbinectedin    Treatment history:      09/03/2024-03/19/25:  Atezolizumab  05/11/2024-08/12/2024:  Carbo etoposide atezolizumab, atezolizumab was omitted on 1st cycle due to being inpatient.  Dates unknown:  Concurrent radiation therapy         HPI:    Patient is a 54-year-old male with history of alcohol use presented to the [facility] with symptoms of left neck swelling and change in speech.  In the ER he would soft tissue neck CT done which revealed generalized edema throughout the soft tissue as well as right jugular vein engorgement and a large lung mass in the right lung apex concerning for malignancy.  Patient was started on Lovenox 1 milligram/kg b.i.d. 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  A CT of the abdomen and pelvis on 05/06/2024 revealed questionable metastases to the liver.  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  Patient received cycle 1 of carbo etoposide on 05/11/2024 while inpatient at [facility].  He was referred to our clinic for outpatient care and further treatment management.  He presented to clinic on 05/24/2024 to establish care.    Patient reports history of smoking, half pack per day, 6 pack of beer daily, prior history of recreational drug use.  Last use 5 years back.  He lives with a friend.  He works offshore on oil field.  Denies family history of malignancy.    He was hospitalized at [facility] in July 2024 for JESUS after cycle 3 of chemotherapy thought to be secondary to nephrotoxic ATN versus acute interstitial nephritis and underwent a kidney biopsy.  he was on  hemodialysis 3 times weekly with Nephrology which was stopped in late August 2024      Interval history:    Today, 04/24/2025, patient reports symptoms of pain around his right chest wall but denies any other acute concerns.  He reports intermittent shortness of breath which resolves without intervention.  He denies any headaches, vision changes or focal weakness.  He denies any neck pain.  He denies any nausea, vomiting, fevers or chills.  He reports undergoing PICC line placement without any significant complications    Pathology     05/10/2024 for our pulmonary lymph node biopsy small-cell carcinoma, grade 3 neuroendocrine carcinoma with extensive necrosis.  Lymph node pulmonary 7 L EBUS biopsy small-cell carcinoma, with extensive necrosis.      Imaging     04/04/2025 PET-CT:  Increased consolidative opacities containing air bronchograms in the right lung with mild-to-moderate uptake, possibly representing posttreatment inflammation, although the relative contribution of metabolically active tumor is difficult to determine.  Interval development of hypermetabolic pleural-based opacities in the right hemithorax, representing metastatic disease.  New hypermetabolic retroesophageal lymph node, also likely metastatic.  New hypermetabolism at C7, concerning for metabolically active disease.  Additional osteoblastic metastatic lesions remain metabolically quiescent.  Nonspecific stomach and bowel uptake      12/06/2024 PET-CT:  No significant change in size or metabolically activity of the cavitary mass in periphery of the right upper lobe.  Stable metabolically Haim osseous metastases.  Increased patchy reticular and ground-glass opacities in the right lung with mild uptake likely inflammatory in etiology.  Continued monitoring is recommended.  Physiological brown fat uptake      08/26/2024 PET-CT:  Cavitary masslike opacity in the periphery of the right upper lobe with moderate uptake in keeping with the patient  was known cancer.  Patchy ground-glass and reticular densities in the right upper lobe with mild uptake likely represents postradiation pneumonitis.  However continued monitoring is recommended.  Nonspecific mild uptake in the small precarinal lymph node that she will be monitored.  Multiple osteoblastic lesions without abnormal uptake, possibly representing treated metastases.  The cavitary lesion measures 3.8 x 3 cm with an SUV of 4.7.    06/10/24 NM Bone scan: There are multiple areas of increased uptake within the thoracic and lumbar region possible related to arthritis, however, a metastatic disease must be considered.     05/07/2024 MRI brain with and without contrast:  No obvious metastatic disease seen however large areas of cerebral convexity specifically on the right side and to a lesser extent on the left side are not well evaluated on this examination due to artifact.    05/06/24 CT Chest w/ contrast: Large mass in right lung apex right hilum and mediastinum measuring 11 cm X 9 cm X 7.2 cm causing obstruction of the superior vena cava at the junction of the brachiocephalic veins.     05/06/24 CT soft tissue neck w/ contrast- BINTA  05/06/24 CT Head/Brain w/ contrast: - BINTA  05/06/2024 CT abdomen pelvis with IV contrast:  Nonspecific hypodense lesion is identified in the liver may represent metastatic disease.  Other secondary findings as noted above.  The liver mass is 9 mm in size.      Laboratory     04/23/2025:  CMP unremarkable, WBC count 6.4, hemoglobin 15.4, , platelet count 219.  ANC> 1000    04/14/2025:  CMP unremarkable, TSH 3.4, CBC unremarkable    03/18/2025 creatinine 1.0, total bili 0.1, LFTs unremarkable, TSH 2.6, alkaline phosphatase 153, WBC count 5.8, hemoglobin 14.4, MCV 1 1.9, platelet count 207, ANC> 1000, cortisol pending.    2/3/25 creatinine 1.0, LFTs unremarkable, ALK phos 135, TSH 3.52, cortisol pending, WBC count 6.3, hemoglobin 13.7, MCV 99.3, platelet count 276, ANC  4.64.    12/09/2024 creatinine 1.2, LFTs unremarkable, TSH 3.6, cortisol 13.9, WBC count 5.4, hemoglobin 14.6, MCV 98, platelet count 278, ANC 4.2.    11/17/24: cr 0.89, alb 3.9, ca 9.4, LFTs WNL, alkphos 126, wbc 4.35, hgb 15.1, plt 2257, ANC 2.86, TSH 3.1129  10/14/24: cr 1.1, alb 3.6, ca 9.6, LFTs WNL, alkphos 147, wbc 6.7, hgb 13.8, plt 216, ANC 5.11, TSH 2.25  09/02/2024 creatinine 1.1, ALK phos 142, ALT 14, , T bili 0.3, WBC count 5.6, hemoglobin 12.0, .5, platelet count 272, ANC 3.86.  09/02/2024 creatinine 1.5, LFTs unremarkable, WBC count 3.2, hemoglobin 8.8, MCV 97.5, platelet count 221.  08/12/24: cr 5.16, alb 3.5, ca 10.4, mag 2.5, phosphorus 4.3, TSH 0.968, cortisol 14.9, wbc 6.20, hgb 8.5, plt 169, ANC 5.07  07/16/24: cr 3.52, alb 2.7, ca 8.9, LFTs WNL, mag 2.0, phosphorus 3.4, cortisol 19.8, tsh 1.34, wbc 5.23, hgb 9.8, plt 195, ANC 3.86  06/24/24: cr 0.7, alb 3.3, ca 9.4, LFTs WNL, TSH 1.83, mag 2.0, phosphorus 4.1,  wbc 5.3, hgb 13.4, plt 469, cortisol pending   06/02/2024:  Creatinine 0 point 7, LFTs unremarkable, TSH 0.98, WBC count 14.6, hemoglobin 14, MCV 95.4, platelet count 267, cortisol pending.      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       Past Surgical History:   Procedure Laterality Date    CATARACT EXTRACTION W/  INTRAOCULAR LENS IMPLANT Left 06/28/2023    Dr Fritz Cornejo    ENDOBRONCHIAL ULTRASOUND N/A 05/08/2024    Dr Kayden Smart    HERNIA REPAIR  1993    INSERTION OF TUNNELED CENTRAL VENOUS HEMODIALYSIS CATHETER N/A 7/29/2024    Procedure: Insertion,  Catheter, Central Venous, Hemodialysis;  Surgeon: Abel Goldstein DO;  Location: [facility] CATH LAB;  Service: Nephrology;  Laterality: N/A;    REMOVAL OF TUNNELED CENTRAL VENOUS CATHETER (CVC) N/A 9/16/2024    Procedure: REMOVAL, CATHETER, CENTRAL VENOUS, TUNNELED;  Surgeon: Abel Goldstein DO;  Location: [facility] CATH LAB;  Service: Nephrology;  Laterality: N/A;       Family History   Problem Relation Name Age of Onset    No Known Problems Mother      No Known Problems Father         Social History     Socioeconomic History    Marital status: Single   Tobacco Use    Smoking status: Some Days     Current packs/day: 0.25     Types: Cigarettes    Smokeless tobacco: Former   Substance and Sexual Activity    Alcohol use: Not Currently     Comment: 1/2 pint of whiskey daily    Drug use: Not Currently       Current Outpatient Medications   Medication Sig Dispense Refill    ondansetron (ZOFRAN) 8 MG tablet Take 1 tablet (8 mg total) by mouth every 8 (eight) hours as needed for Nausea. 30 tablet 1    oxyCODONE-acetaminophen (PERCOCET) 5-325 mg per tablet Take 1 tablet by mouth every 6 (six) hours as needed for Pain. 30 tablet 0    prochlorperazine (COMPAZINE) 5 MG tablet Take 1 tablet (5 mg total) by mouth every 6 (six) hours as needed for Nausea. 30 tablet 3    albuterol (PROVENTIL/VENTOLIN HFA) 90 mcg/actuation inhaler Inhale 2 puffs into the lungs every 6 (six) hours as needed for Shortness of Breath. (Patient not taking: Reported on 10/15/2024)      apixaban (ELIQUIS) 5 mg Tab Take 5 mg by mouth 2 (two) times daily.      b complex vitamins tablet Take 1 tablet by mouth 3 (three) times daily. (Patient not taking: Reported on 4/14/2025)      diphenoxylate-atropine 2.5-0.025 mg (LOMOTIL) 2.5-0.025 mg per tablet Take 1 tablet by mouth 4 (four) times daily as needed for Diarrhea. 30 tablet 4    folic acid (FOLVITE) 1 MG tablet Take 1 tablet (1 mg total) by mouth once daily. 30 tablet 2    OLANZapine (ZYPREXA) 5 MG tablet Take 1 tablet  by mouth nightly on days 1-4 of chemotherapy treatment.  Will only take for cycles 1-4. 16 tablet 0    pantoprazole (PROTONIX) 40 MG tablet Take 1 tablet (40 mg total) by mouth before breakfast. 30 tablet 2    promethazine (PHENERGAN) 12.5 MG Tab Take 1 tablet (12.5 mg total) by mouth 4 (four) times daily. (Patient not taking: Reported on 2/4/2025) 45 tablet 1     No current facility-administered medications for this visit.       Review of patient's allergies indicates:  No Known Allergies      Review of systems  CONSTITUTIONAL: no fevers, no chills, no weight loss, no fatigue, no weakness  HEMATOLOGIC: no abnormal bleeding, no abnormal bruising, no drenching night sweats  ONCOLOGIC: no new masses or lumps  HEENT: no vision loss, no tinnitus or hearing loss, no nose bleeding, no dysphagia, no odynophagia  CVS: no chest pain, no palpitations, no dyspnea on exertion  RESP: no shortness of breath, no hemoptysis, no cough  GI: no nausea, no vomiting, no diarrhea, no constipation, no melena, no hematochezia, no hematemesis, no abdominal pain, no increase in abdominal girth  : no dysuria, no hematuria, no hesitancy, no scrotal swelling, no discharge  INTEGUMENT: no rashes, no abnormal bruising, no nail pitting, no hyperpigmentation  NEURO: no falls, no memory loss, no paresthesias or dysesthesias, no urofecal incontinence or retention, no loss of strength on any extremity  MSK: no back pain, no new joint pain, no joint swelling  PSYCH: no suicidal or homicidal ideation, no depression, no insomnia, no anhedonia  ENDOCRINE: no heat or cold intolerance, no polyuria, no polydipsia      Physical Exam:  Vitals:    04/24/25 0853   BP: 119/85   Pulse: 99   Resp: 14   Temp: 98 °F (36.7 °C)                 GA: AAOx3, NAD  HEENT: NCAT, moist oral mucous membranes,  CVS: s1s2 RRR, no M/R/G, PICC line in left upper extremity without surrounding edema or erythema  RESP:  Bibasilar crackles, no rhonchi.  Good bilateral air  entry.  EXT: no deformities, no pedal edema  SKIN: no rashes, warm and dry  NEURO: normal mentation, strength 5/5 on all 4 extremities, no sensory deficits        Assessment    # Small-cell lung cancer, probable extensive stage possible liver metastases   Reviewed pathology report and imaging studies   Discussed with patient the diagnosis of small-cell lung cancer, the aggressive nature of disease, the questionable liver involvement and likely advanced incurable stage with palliative nature of his treatment  Patient was status post 1st cycle of carbo etoposide while inpatient, cycle 1 day 1 on 05/11/2024  He was referred for port placement however surgery deemed it appropriate for a PICC line given the concern for SVC syndrome and SVC thrombus.    Patient was agreeable to proceed with palliative systemic chemotherapy with carbo etoposide atezolizumab  Patient had a hospital admission with JESUS in July 2024 and underwent a kidney biopsy following which he was transitioned to hemodialysis with Nephrology  Given that he was currently on hemodialysis, discussed the plan for dose reductions chemotherapy and the plan for chemotherapy cycle 4 day 1 to be done 12-24 hours before his dialysis.  Will discuss patient's chemotherapy dosing with pharmacy as well  Patient reports being off dialysis since late August 2024 as directed by Nephrology   PET-CT in August 2024 with positive response to therapy  Patient transitioned to maintenance atezolizumab on 09/03/2024  PET-CT in December 2024 with stable disease, no evidence of progression.    PET-CT in April 2025 with findings concerning for progression of disease  Discussed with patient the need to transitioned to second-line therapy given progression of disease on above PET-CT.  Discussed the options for treatment at [place] with by specific antibody, patient will not be able to travel to [place] due to logistical issues.  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    # cancer-related pain   Controlled, on Percocet 5-325 q.6 hours PRN    Plan   Reviewed labs, proceed with cycle 1 day 1 lurbinectedin in today  Patient is scheduled for port placement with surgery on 1st May 2025  Noted plans for palliative radiation therapy to C7 in the near future  Continue Oxycodone 5-325 q.6 hours p.r.n.   Continue Eliquis 5 BID  Plan to follow-up in 3 weeks, labs the day prior to cycle 2 to assess treatment tolerance  Follow-up with Nephrology as scheduled.    Portions of the record may have been created with voice recognition software. Occasional wrong-word or sound-a-like substitutions may have occurred due to the inherent limitations of voice recognition software.

## 2025-04-30 DIAGNOSIS — C34.81 SMALL CELL LUNG CANCER, OVERLAPPING SITES OF RIGHT LUNG: Primary | ICD-10-CM

## 2025-04-30 DIAGNOSIS — G89.3 CANCER RELATED PAIN: ICD-10-CM

## 2025-04-30 DIAGNOSIS — Z51.11 ENCOUNTER FOR ANTINEOPLASTIC CHEMOTHERAPY: ICD-10-CM

## 2025-05-01 ENCOUNTER — TELEPHONE (OUTPATIENT)
Dept: HEMATOLOGY/ONCOLOGY | Facility: CLINIC | Age: 55
End: 2025-05-01
Payer: MEDICAID

## 2025-05-01 NOTE — TELEPHONE ENCOUNTER
Veronica with Dr. Carlson's office reports rescheduled mediport insertion d/t Pt is still on Eliquis. States instructions were given to stop.  Rescheduled to 5/20/2025. Order faxed to [facility] in [place] for PICC Line supplies and to [facility] for PICC line teaching and weekly dressing changes. Pt notified and states unable to go to [place] to  PICC supplies. Aleta Ronquillo instructed to ship supplies to this clinic.--SC, LPN

## 2025-05-14 NOTE — PROGRESS NOTES
Referring provider:  Dr. Campbell    Reason for consultation:  Small-cell lung cancer    Diagnosis:  Small-cell lung cancer, extensive stage.    Current treatment:    04/24/2025-current:  lurbinectedin    Treatment history:    09/03/2024-03/19/25:  Atezolizumab  05/11/2024-08/12/2024:  Carbo etoposide atezolizumab, atezolizumab was omitted on 1st cycle due to being inpatient.  Dates unknown:  Concurrent radiation therapy     HPI:    Patient is a 54-year-old male with history of alcohol use presented to the [facility] with symptoms of left neck swelling and change in speech.  In the ER he would soft tissue neck CT done which revealed generalized edema throughout the soft tissue as well as right jugular vein engorgement and a large lung mass in the right lung apex concerning for malignancy.  Patient was started on Lovenox 1 milligram/kg b.i.d. 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  A CT of the abdomen and pelvis on 05/06/2024 revealed questionable metastases to the liver.  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  Patient received cycle 1 of carbo etoposide on 05/11/2024 while inpatient at [facility].  He was referred to our clinic for outpatient care and further treatment management.  He presented to clinic on 05/24/2024 to establish care.    Patient reports history of smoking, half pack per day, 6 pack of beer daily, prior history of recreational drug use.  Last use 5 years back.  He lives with a friend.  He works offshore on oil field.  Denies family history of malignancy.    He was hospitalized at [facility] in July 2024 for JESUS after cycle 3 of chemotherapy thought to be secondary to nephrotoxic ATN versus acute interstitial nephritis and underwent a kidney biopsy.  he was on hemodialysis 3  times weekly with Nephrology which was stopped in late August 2024      Interval history:    Today, 05/15/2025, patient reports symptoms nausea and shortness of breath on exertion denies any other acute concerns. He denies any headaches, vision changes or focal weakness.  He denies any neck pain.  He denies any vomiting, fevers or chills.     Pathology     05/10/2024 for our pulmonary lymph node biopsy small-cell carcinoma, grade 3 neuroendocrine carcinoma with extensive necrosis.  Lymph node pulmonary 7 L EBUS biopsy small-cell carcinoma, with extensive necrosis.    Imaging     04/04/2025 PET-CT:  Increased consolidative opacities containing air bronchograms in the right lung with mild-to-moderate uptake, possibly representing posttreatment inflammation, although the relative contribution of metabolically active tumor is difficult to determine.  Interval development of hypermetabolic pleural-based opacities in the right hemithorax, representing metastatic disease.  New hypermetabolic retroesophageal lymph node, also likely metastatic.  New hypermetabolism at C7, concerning for metabolically active disease.  Additional osteoblastic metastatic lesions remain metabolically quiescent.  Nonspecific stomach and bowel uptake      12/06/2024 PET-CT:  No significant change in size or metabolically activity of the cavitary mass in periphery of the right upper lobe.  Stable metabolically Haim osseous metastases.  Increased patchy reticular and ground-glass opacities in the right lung with mild uptake likely inflammatory in etiology.  Continued monitoring is recommended.  Physiological brown fat uptake      08/26/2024 PET-CT:  Cavitary masslike opacity in the periphery of the right upper lobe with moderate uptake in keeping with the patient was known cancer.  Patchy ground-glass and reticular densities in the right upper lobe with mild uptake likely represents postradiation pneumonitis.  However continued monitoring is  recommended.  Nonspecific mild uptake in the small precarinal lymph node that she will be monitored.  Multiple osteoblastic lesions without abnormal uptake, possibly representing treated metastases.  The cavitary lesion measures 3.8 x 3 cm with an SUV of 4.7.    06/10/24 NM Bone scan: There are multiple areas of increased uptake within the thoracic and lumbar region possible related to arthritis, however, a metastatic disease must be considered.     05/07/2024 MRI brain with and without contrast:  No obvious metastatic disease seen however large areas of cerebral convexity specifically on the right side and to a lesser extent on the left side are not well evaluated on this examination due to artifact.    05/06/24 CT Chest w/ contrast: Large mass in right lung apex right hilum and mediastinum measuring 11 cm X 9 cm X 7.2 cm causing obstruction of the superior vena cava at the junction of the brachiocephalic veins.     05/06/24 CT soft tissue neck w/ contrast- BINTA  05/06/24 CT Head/Brain w/ contrast: - BINTA  05/06/2024 CT abdomen pelvis with IV contrast:  Nonspecific hypodense lesion is identified in the liver may represent metastatic disease.  Other secondary findings as noted above.  The liver mass is 9 mm in size.      Laboratory   04/23/2025:  CMP unremarkable, WBC count 4.2, hemoglobin 14.1, MCV 98.6, platelet count 214.  ANC 2.39    04/23/2025:  CMP unremarkable, WBC count 6.4, hemoglobin 15.4, , platelet count 219.  ANC> 1000    04/14/2025:  CMP unremarkable, TSH 3.4, CBC unremarkable    03/18/2025 creatinine 1.0, total bili 0.1, LFTs unremarkable, TSH 2.6, alkaline phosphatase 153, WBC count 5.8, hemoglobin 14.4, MCV 1 1.9, platelet count 207, ANC> 1000, cortisol pending.    2/3/25 creatinine 1.0, LFTs unremarkable, ALK phos 135, TSH 3.52, cortisol pending, WBC count 6.3, hemoglobin 13.7, MCV 99.3, platelet count 276, ANC 4.64.    12/09/2024 creatinine 1.2, LFTs unremarkable, TSH 3.6, cortisol 13.9, WBC  count 5.4, hemoglobin 14.6, MCV 98, platelet count 278, ANC 4.2.    11/17/24: cr 0.89, alb 3.9, ca 9.4, LFTs WNL, alkphos 126, wbc 4.35, hgb 15.1, plt 2257, ANC 2.86, TSH 3.1129  10/14/24: cr 1.1, alb 3.6, ca 9.6, LFTs WNL, alkphos 147, wbc 6.7, hgb 13.8, plt 216, ANC 5.11, TSH 2.25  09/02/2024 creatinine 1.1, ALK phos 142, ALT 14, , T bili 0.3, WBC count 5.6, hemoglobin 12.0, .5, platelet count 272, ANC 3.86.  09/02/2024 creatinine 1.5, LFTs unremarkable, WBC count 3.2, hemoglobin 8.8, MCV 97.5, platelet count 221.  08/12/24: cr 5.16, alb 3.5, ca 10.4, mag 2.5, phosphorus 4.3, TSH 0.968, cortisol 14.9, wbc 6.20, hgb 8.5, plt 169, ANC 5.07  07/16/24: cr 3.52, alb 2.7, ca 8.9, LFTs WNL, mag 2.0, phosphorus 3.4, cortisol 19.8, tsh 1.34, wbc 5.23, hgb 9.8, plt 195, ANC 3.86  06/24/24: cr 0.7, alb 3.3, ca 9.4, LFTs WNL, TSH 1.83, mag 2.0, phosphorus 4.1,  wbc 5.3, hgb 13.4, plt 469, cortisol pending   06/02/2024:  Creatinine 0 point 7, LFTs unremarkable, TSH 0.98, WBC count 14.6, hemoglobin 14, MCV 95.4, platelet count 267, cortisol pending.      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       Past Surgical History:   Procedure Laterality Date    CATARACT EXTRACTION W/  INTRAOCULAR LENS IMPLANT Left 06/28/2023    Dr Fritz Cornejo    ENDOBRONCHIAL ULTRASOUND N/A 05/08/2024    Dr Kayden Smart    HERNIA REPAIR  1993    INSERTION OF TUNNELED CENTRAL VENOUS HEMODIALYSIS CATHETER N/A 7/29/2024    Procedure: Insertion, Catheter, Central Venous, Hemodialysis;  Surgeon: Abel Goldstein DO;  Location: [facility]  CATH LAB;  Service: Nephrology;  Laterality: N/A;    REMOVAL OF TUNNELED CENTRAL VENOUS CATHETER (CVC) N/A 9/16/2024    Procedure: REMOVAL, CATHETER, CENTRAL VENOUS, TUNNELED;  Surgeon: Abel Goldstein DO;  Location: [facility] CATH LAB;  Service: Nephrology;  Laterality: N/A;       Family History   Problem Relation Name Age of Onset    No Known Problems Mother      No Known Problems Father         Social History     Socioeconomic History    Marital status: Single   Tobacco Use    Smoking status: Some Days     Current packs/day: 0.25     Types: Cigarettes    Smokeless tobacco: Former   Substance and Sexual Activity    Alcohol use: Not Currently     Comment: 1/2 pint of whiskey daily    Drug use: Not Currently       Current Outpatient Medications   Medication Sig Dispense Refill    apixaban (ELIQUIS) 5 mg Tab Take 5 mg by mouth 2 (two) times daily.      diphenoxylate-atropine 2.5-0.025 mg (LOMOTIL) 2.5-0.025 mg per tablet Take 1 tablet by mouth 4 (four) times daily as needed for Diarrhea. 30 tablet 4    OLANZapine (ZYPREXA) 5 MG tablet Take 1 tablet by mouth nightly on days 1-4 of chemotherapy treatment.  Will only take for cycles 1-4. 16 tablet 0    prochlorperazine (COMPAZINE) 5 MG tablet Take 1 tablet (5 mg total) by mouth every 6 (six) hours as needed for Nausea. 30 tablet 3    albuterol (PROVENTIL/VENTOLIN HFA) 90 mcg/actuation inhaler Inhale 2 puffs into the lungs every 6 (six) hours as needed for Shortness of Breath. (Patient not taking: Reported on 5/15/2025)      b complex vitamins tablet Take 1 tablet by mouth 3 (three) times daily. (Patient not taking: Reported on 5/15/2025)      folic acid (FOLVITE) 1 MG tablet Take 1 tablet (1 mg total) by mouth once daily. 30 tablet 2    ondansetron (ZOFRAN) 8 MG tablet Take 1 tablet (8 mg total) by mouth every 6 (six) hours as needed for Nausea. 30 tablet 1    oxyCODONE-acetaminophen (PERCOCET) 5-325 mg per tablet Take 1 tablet by mouth every 6 (six) hours as needed for Pain.  30 tablet 0    pantoprazole (PROTONIX) 40 MG tablet Take 1 tablet (40 mg total) by mouth before breakfast. 30 tablet 2    promethazine (PHENERGAN) 12.5 MG Tab Take 1 tablet (12.5 mg total) by mouth 4 (four) times daily. 45 tablet 1     No current facility-administered medications for this visit.       Review of patient's allergies indicates:  No Known Allergies      Review of systems  CONSTITUTIONAL: no fevers, no chills, no weight loss, no fatigue, no weakness  HEMATOLOGIC: no abnormal bleeding, no abnormal bruising, no drenching night sweats  ONCOLOGIC: no new masses or lumps  HEENT: no vision loss, no tinnitus or hearing loss, no nose bleeding, no dysphagia, no odynophagia  CVS: no chest pain, no palpitations, no dyspnea on exertion  RESP: no shortness of breath, no hemoptysis, no cough  GI: no nausea, no vomiting, no diarrhea, no constipation, no melena, no hematochezia, no hematemesis, no abdominal pain, no increase in abdominal girth  : no dysuria, no hematuria, no hesitancy, no scrotal swelling, no discharge  INTEGUMENT: no rashes, no abnormal bruising, no nail pitting, no hyperpigmentation  NEURO: no falls, no memory loss, no paresthesias or dysesthesias, no urofecal incontinence or retention, no loss of strength on any extremity  MSK: no back pain, no new joint pain, no joint swelling  PSYCH: no suicidal or homicidal ideation, no depression, no insomnia, no anhedonia  ENDOCRINE: no heat or cold intolerance, no polyuria, no polydipsia      Physical Exam:  Vitals:    05/15/25 1029   BP: (!) 131/91   Pulse: 97   Resp: 14   Temp: 98.1 °F (36.7 °C)                   GA: AAOx3, NAD  HEENT: NCAT, moist oral mucous membranes,  CVS: s1s2 RRR, no M/R/G, PICC line in left upper extremity without surrounding edema or erythema  RESP:  Bibasilar crackles, no rhonchi.  Good bilateral air entry.  EXT: no deformities, no pedal edema  SKIN: no rashes, warm and dry  NEURO: normal mentation, strength 5/5 on all 4  extremities, no sensory deficits        Assessment    # Small-cell lung cancer, probable extensive stage possible liver metastases   Reviewed pathology report and imaging studies   Discussed with patient the diagnosis of small-cell lung cancer, the aggressive nature of disease, the questionable liver involvement and likely advanced incurable stage with palliative nature of his treatment  Patient was status post 1st cycle of carbo etoposide while inpatient, cycle 1 day 1 on 05/11/2024  He was referred for port placement however surgery deemed it appropriate for a PICC line given the concern for SVC syndrome and SVC thrombus.    Patient was agreeable to proceed with palliative systemic chemotherapy with carbo etoposide atezolizumab  Patient had a hospital admission with JESUS in July 2024 and underwent a kidney biopsy following which he was transitioned to hemodialysis with Nephrology  Given that he was currently on hemodialysis, discussed the plan for dose reductions chemotherapy and the plan for chemotherapy cycle 4 day 1 to be done 12-24 hours before his dialysis.  Will discuss patient's chemotherapy dosing with pharmacy as well  Patient reports being off dialysis since late August 2024 as directed by Nephrology   PET-CT in August 2024 with positive response to therapy  Patient transitioned to maintenance atezolizumab on 09/03/2024  PET-CT in December 2024 with stable disease, no evidence of progression.    PET-CT in April 2025 with findings concerning for progression of disease  Discussed with patient the need to transitioned to second-line therapy given progression of disease on above PET-CT.  Discussed the options for treatment at [place] with by specific antibody, patient will not be able to travel to [place] due to logistical issues.  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    # cancer-related pain   Controlled, on Percocet 5-325 q.6 hours PRN    Plan   Reviewed labs, proceed with cycle 2 day 1 lurbinectedin in today  Noted plans for palliative radiation therapy to C7 in the near future  Continue Oxycodone 5-325 q.6 hours p.r.n.   Continue Eliquis 5 BID  Plan to follow-up in 3 weeks, labs the day prior to cycle 3 to assess treatment tolerance  Follow-up with Nephrology as scheduled.    Rosita Aguirre FNP-C

## 2025-05-15 ENCOUNTER — OFFICE VISIT (OUTPATIENT)
Dept: HEMATOLOGY/ONCOLOGY | Facility: CLINIC | Age: 55
End: 2025-05-15
Payer: MEDICAID

## 2025-05-15 VITALS
OXYGEN SATURATION: 99 % | SYSTOLIC BLOOD PRESSURE: 131 MMHG | HEART RATE: 97 BPM | WEIGHT: 111.31 LBS | DIASTOLIC BLOOD PRESSURE: 91 MMHG | BODY MASS INDEX: 17.89 KG/M2 | HEIGHT: 66 IN | RESPIRATION RATE: 14 BRPM | TEMPERATURE: 98 F

## 2025-05-15 DIAGNOSIS — I87.1 SUPERIOR VENA CAVA SYNDROME: ICD-10-CM

## 2025-05-15 DIAGNOSIS — Z51.11 ENCOUNTER FOR ANTINEOPLASTIC CHEMOTHERAPY: ICD-10-CM

## 2025-05-15 DIAGNOSIS — G89.3 CANCER RELATED PAIN: ICD-10-CM

## 2025-05-15 DIAGNOSIS — C79.51 SECONDARY MALIGNANT NEOPLASM OF BONE: ICD-10-CM

## 2025-05-15 DIAGNOSIS — R19.7 DIARRHEA, UNSPECIFIED TYPE: ICD-10-CM

## 2025-05-15 DIAGNOSIS — C34.81 SMALL CELL LUNG CANCER, OVERLAPPING SITES OF RIGHT LUNG: Primary | ICD-10-CM

## 2025-05-15 DIAGNOSIS — Z79.01 CURRENT USE OF LONG TERM ANTICOAGULATION: ICD-10-CM

## 2025-05-15 DIAGNOSIS — T45.1X5A CHEMOTHERAPY-INDUCED NAUSEA: ICD-10-CM

## 2025-05-15 DIAGNOSIS — R11.0 CHEMOTHERAPY-INDUCED NAUSEA: ICD-10-CM

## 2025-05-15 DIAGNOSIS — K76.9 LIVER LESION: ICD-10-CM

## 2025-05-15 RX ORDER — ONDANSETRON 8 MG/1
8 TABLET, FILM COATED ORAL EVERY 6 HOURS PRN
Qty: 30 TABLET | Refills: 1 | Status: SHIPPED | OUTPATIENT
Start: 2025-05-15

## 2025-05-15 RX ORDER — HEPARIN 100 UNIT/ML
500 SYRINGE INTRAVENOUS
Status: CANCELLED | OUTPATIENT
Start: 2025-05-15

## 2025-05-15 RX ORDER — SODIUM CHLORIDE 0.9 % (FLUSH) 0.9 %
10 SYRINGE (ML) INJECTION
Status: CANCELLED | OUTPATIENT
Start: 2025-05-15

## 2025-05-15 RX ORDER — OXYCODONE AND ACETAMINOPHEN 5; 325 MG/1; MG/1
1 TABLET ORAL EVERY 6 HOURS PRN
Qty: 30 TABLET | Refills: 0 | Status: SHIPPED | OUTPATIENT
Start: 2025-05-15

## 2025-05-15 RX ORDER — PROMETHAZINE HYDROCHLORIDE 12.5 MG/1
12.5 TABLET ORAL 4 TIMES DAILY
Qty: 45 TABLET | Refills: 1 | Status: SHIPPED | OUTPATIENT
Start: 2025-05-15

## 2025-06-10 ENCOUNTER — OFFICE VISIT (OUTPATIENT)
Dept: HEMATOLOGY/ONCOLOGY | Facility: CLINIC | Age: 55
End: 2025-06-10
Payer: MEDICAID

## 2025-06-10 VITALS
WEIGHT: 110.38 LBS | TEMPERATURE: 98 F | HEART RATE: 95 BPM | DIASTOLIC BLOOD PRESSURE: 98 MMHG | BODY MASS INDEX: 17.74 KG/M2 | SYSTOLIC BLOOD PRESSURE: 131 MMHG | RESPIRATION RATE: 14 BRPM | OXYGEN SATURATION: 99 % | HEIGHT: 66 IN

## 2025-06-10 DIAGNOSIS — C34.81 SMALL CELL LUNG CANCER, OVERLAPPING SITES OF RIGHT LUNG: ICD-10-CM

## 2025-06-10 DIAGNOSIS — R19.7 DIARRHEA, UNSPECIFIED TYPE: ICD-10-CM

## 2025-06-10 DIAGNOSIS — Z51.11 ENCOUNTER FOR ANTINEOPLASTIC CHEMOTHERAPY: ICD-10-CM

## 2025-06-10 DIAGNOSIS — C79.51 SECONDARY MALIGNANT NEOPLASM OF BONE: ICD-10-CM

## 2025-06-10 DIAGNOSIS — K76.9 LIVER LESION: ICD-10-CM

## 2025-06-10 DIAGNOSIS — T45.1X5A CHEMOTHERAPY-INDUCED NAUSEA: ICD-10-CM

## 2025-06-10 DIAGNOSIS — I87.1 SUPERIOR VENA CAVA SYNDROME: ICD-10-CM

## 2025-06-10 DIAGNOSIS — G89.3 CANCER RELATED PAIN: ICD-10-CM

## 2025-06-10 DIAGNOSIS — Z79.01 CURRENT USE OF LONG TERM ANTICOAGULATION: ICD-10-CM

## 2025-06-10 DIAGNOSIS — R11.0 CHEMOTHERAPY-INDUCED NAUSEA: ICD-10-CM

## 2025-06-10 PROCEDURE — 1159F MED LIST DOCD IN RCRD: CPT | Mod: CPTII,,, | Performed by: STUDENT IN AN ORGANIZED HEALTH CARE EDUCATION/TRAINING PROGRAM

## 2025-06-10 PROCEDURE — 3075F SYST BP GE 130 - 139MM HG: CPT | Mod: CPTII,,, | Performed by: STUDENT IN AN ORGANIZED HEALTH CARE EDUCATION/TRAINING PROGRAM

## 2025-06-10 PROCEDURE — 99215 OFFICE O/P EST HI 40 MIN: CPT | Mod: ,,, | Performed by: STUDENT IN AN ORGANIZED HEALTH CARE EDUCATION/TRAINING PROGRAM

## 2025-06-10 PROCEDURE — 3080F DIAST BP >= 90 MM HG: CPT | Mod: CPTII,,, | Performed by: STUDENT IN AN ORGANIZED HEALTH CARE EDUCATION/TRAINING PROGRAM

## 2025-06-10 PROCEDURE — 3008F BODY MASS INDEX DOCD: CPT | Mod: CPTII,,, | Performed by: STUDENT IN AN ORGANIZED HEALTH CARE EDUCATION/TRAINING PROGRAM

## 2025-06-10 PROCEDURE — G2211 COMPLEX E/M VISIT ADD ON: HCPCS | Mod: ,,, | Performed by: STUDENT IN AN ORGANIZED HEALTH CARE EDUCATION/TRAINING PROGRAM

## 2025-06-10 RX ORDER — SODIUM CHLORIDE 0.9 % (FLUSH) 0.9 %
10 SYRINGE (ML) INJECTION
Status: CANCELLED | OUTPATIENT
Start: 2025-06-10

## 2025-06-10 RX ORDER — HEPARIN 100 UNIT/ML
500 SYRINGE INTRAVENOUS
Status: CANCELLED | OUTPATIENT
Start: 2025-06-10

## 2025-06-10 RX ORDER — OXYCODONE AND ACETAMINOPHEN 5; 325 MG/1; MG/1
1 TABLET ORAL EVERY 6 HOURS PRN
Qty: 30 TABLET | Refills: 0 | Status: SHIPPED | OUTPATIENT
Start: 2025-06-10

## 2025-06-10 RX ORDER — OXYCODONE AND ACETAMINOPHEN 5; 325 MG/1; MG/1
1 TABLET ORAL EVERY 6 HOURS PRN
Qty: 30 TABLET | Refills: 0 | Status: CANCELLED | OUTPATIENT
Start: 2025-06-10

## 2025-06-10 NOTE — PROGRESS NOTES
Referring provider:  Dr. Campbell    Reason for consultation:  Small-cell lung cancer    Diagnosis:  Small-cell lung cancer, extensive stage.    Current treatment:    04/24/2025-current:  lurbinectedin    Treatment history:    Dates unknown:  Palliative radiation therapy for 3/5 fractions to C7.  Patient was unable to complete all treatments due to no-shows  09/03/2024-03/19/25:  Atezolizumab  05/11/2024-08/12/2024:  Carbo etoposide atezolizumab, atezolizumab was omitted on 1st cycle due to being inpatient.  Dates unknown:  Concurrent radiation therapy     HPI:    Patient is a 55-year-old male with history of alcohol use presented to the hospital at [facility] with symptoms of left neck swelling and change in speech.  In the ER he would soft tissue neck CT done which revealed generalized edema throughout the soft tissue as well as right jugular vein engorgement and a large lung mass in the right lung apex concerning for malignancy.  Patient was started on Lovenox 1 milligram/kg b.i.d. 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  A CT of the abdomen and pelvis on 05/06/2024 revealed questionable metastases to the liver.  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  Patient received cycle 1 of carbo etoposide on 05/11/2024 while inpatient at [facility].  He was referred to our clinic for outpatient care and further treatment management.  He presented to clinic on 05/24/2024 to establish care.    Patient reports history of smoking, half pack per day, 6 pack of beer daily, prior history of recreational drug use.  Last use 5 years back.  He lives with a friend.  He works offshore on oil field.  Denies family history of malignancy.    He was hospitalized at [facility] in July 2024 for JESUS after cycle 3 of chemotherapy  thought to be secondary to nephrotoxic ATN versus acute interstitial nephritis and underwent a kidney biopsy.  he was on hemodialysis 3 times weekly with Nephrology which was stopped in late August 2024      Interval history:    Today, 06/10/2025, patient denies any acute concerns today.  He denies any symptoms of worsening shortness of breath, vision changes, focal weakness, decreased appetite or weight loss.  He reports tolerating his last cycle of treatment fairly well he denies any fevers or chills.  He does report recurrence of his low back pain.  He reports intermittent headaches which resolve with Tylenol.  He denies any associated vision changes, focal weakness, falls.    Pathology     05/10/2024 for our pulmonary lymph node biopsy small-cell carcinoma, grade 3 neuroendocrine carcinoma with extensive necrosis.  Lymph node pulmonary 7 L EBUS biopsy small-cell carcinoma, with extensive necrosis.    Imaging     04/04/2025 PET-CT:  Increased consolidative opacities containing air bronchograms in the right lung with mild-to-moderate uptake, possibly representing posttreatment inflammation, although the relative contribution of metabolically active tumor is difficult to determine.  Interval development of hypermetabolic pleural-based opacities in the right hemithorax, representing metastatic disease.  New hypermetabolic retroesophageal lymph node, also likely metastatic.  New hypermetabolism at C7, concerning for metabolically active disease.  Additional osteoblastic metastatic lesions remain metabolically quiescent.  Nonspecific stomach and bowel uptake      12/06/2024 PET-CT:  No significant change in size or metabolically activity of the cavitary mass in periphery of the right upper lobe.  Stable metabolically A[place] osseous metastases.  Increased patchy reticular and ground-glass opacities in the right lung with mild uptake likely inflammatory in etiology.  Continued monitoring is recommended.  Physiological  brown fat uptake      08/26/2024 PET-CT:  Cavitary masslike opacity in the periphery of the right upper lobe with moderate uptake in keeping with the patient was known cancer.  Patchy ground-glass and reticular densities in the right upper lobe with mild uptake likely represents postradiation pneumonitis.  However continued monitoring is recommended.  Nonspecific mild uptake in the small precarinal lymph node that she will be monitored.  Multiple osteoblastic lesions without abnormal uptake, possibly representing treated metastases.  The cavitary lesion measures 3.8 x 3 cm with an SUV of 4.7.    06/10/24 NM Bone scan: There are multiple areas of increased uptake within the thoracic and lumbar region possible related to arthritis, however, a metastatic disease must be considered.     05/07/2024 MRI brain with and without contrast:  No obvious metastatic disease seen however large areas of cerebral convexity specifically on the right side and to a lesser extent on the left side are not well evaluated on this examination due to artifact.    05/06/24 CT Chest w/ contrast: Large mass in right lung apex right hilum and mediastinum measuring 11 cm X 9 cm X 7.2 cm causing obstruction of the superior vena cava at the junction of the brachiocephalic veins.     05/06/24 CT soft tissue neck w/ contrast- BINTA  05/06/24 CT Head/Brain w/ contrast: - BINTA  05/06/2024 CT abdomen pelvis with IV contrast:  Nonspecific hypodense lesion is identified in the liver may represent metastatic disease.  Other secondary findings as noted above.  The liver mass is 9 mm in size.      Laboratory     06/09/2025 creatinine 0.9, LFTs unremarkable, total bili 0.4, WBC count 5.8, hemoglobin 12.4, platelet count 144, ANC> 1000    04/23/2025:  CMP unremarkable, WBC count 4.2, hemoglobin 14.1, MCV 98.6, platelet count 214.  ANC 2.39    04/23/2025:  CMP unremarkable, WBC count 6.4, hemoglobin 15.4, , platelet count 219.  ANC> 1000    04/14/2025:  CMP  unremarkable, TSH 3.4, CBC unremarkable    03/18/2025 creatinine 1.0, total bili 0.1, LFTs unremarkable, TSH 2.6, alkaline phosphatase 153, WBC count 5.8, hemoglobin 14.4, MCV 1 1.9, platelet count 207, ANC> 1000, cortisol pending.    2/3/25 creatinine 1.0, LFTs unremarkable, ALK phos 135, TSH 3.52, cortisol pending, WBC count 6.3, hemoglobin 13.7, MCV 99.3, platelet count 276, ANC 4.64.    12/09/2024 creatinine 1.2, LFTs unremarkable, TSH 3.6, cortisol 13.9, WBC count 5.4, hemoglobin 14.6, MCV 98, platelet count 278, ANC 4.2.    11/17/24: cr 0.89, alb 3.9, ca 9.4, LFTs WNL, alkphos 126, wbc 4.35, hgb 15.1, plt 2257, ANC 2.86, TSH 3.1129  10/14/24: cr 1.1, alb 3.6, ca 9.6, LFTs WNL, alkphos 147, wbc 6.7, hgb 13.8, plt 216, ANC 5.11, TSH 2.25  09/02/2024 creatinine 1.1, ALK phos 142, ALT 14, , T bili 0.3, WBC count 5.6, hemoglobin 12.0, .5, platelet count 272, ANC 3.86.  09/02/2024 creatinine 1.5, LFTs unremarkable, WBC count 3.2, hemoglobin 8.8, MCV 97.5, platelet count 221.  08/12/24: cr 5.16, alb 3.5, ca 10.4, mag 2.5, phosphorus 4.3, TSH 0.968, cortisol 14.9, wbc 6.20, hgb 8.5, plt 169, ANC 5.07  07/16/24: cr 3.52, alb 2.7, ca 8.9, LFTs WNL, mag 2.0, phosphorus 3.4, cortisol 19.8, tsh 1.34, wbc 5.23, hgb 9.8, plt 195, ANC 3.86  06/24/24: cr 0.7, alb 3.3, ca 9.4, LFTs WNL, TSH 1.83, mag 2.0, phosphorus 4.1,  wbc 5.3, hgb 13.4, plt 469, cortisol pending   06/02/2024:  Creatinine 0 point 7, LFTs unremarkable, TSH 0.98, WBC count 14.6, hemoglobin 14, MCV 95.4, platelet count 267, cortisol pending.      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       Past Surgical History:   Procedure Laterality Date    CATARACT EXTRACTION W/  INTRAOCULAR LENS IMPLANT Left 06/28/2023    Dr Fritz Cornejo    ENDOBRONCHIAL ULTRASOUND N/A 05/08/2024    Dr Kayden Smart    HERNIA REPAIR  1993    INSERTION OF TUNNELED CENTRAL VENOUS HEMODIALYSIS CATHETER N/A 7/29/2024    Procedure: Insertion, Catheter, Central Venous, Hemodialysis;  Surgeon: Abel Goldstein DO;  Location: [facility] CATH LAB;  Service: Nephrology;  Laterality: N/A;    REMOVAL OF TUNNELED CENTRAL VENOUS CATHETER (CVC) N/A 9/16/2024    Procedure: REMOVAL, CATHETER, CENTRAL VENOUS, TUNNELED;  Surgeon: Abel Goldstein DO;  Location: [facility] CATH LAB;  Service: Nephrology;  Laterality: N/A;       Family History   Problem Relation Name Age of Onset    No Known Problems Mother      No Known Problems Father         Social History     Socioeconomic History    Marital status: Single   Tobacco Use    Smoking status: Some Days     Current packs/day: 0.25     Types: Cigarettes    Smokeless tobacco: Former   Substance and Sexual Activity    Alcohol use: Not Currently     Comment: 1/2 urielt yasir hoyos daily    Drug use: Not Currently       Current Outpatient Medications   Medication Sig Dispense Refill    apixaban (ELIQUIS) 5 mg Tab Take 5 mg by mouth 2 (two) times daily.      diphenoxylate-atropine 2.5-0.025 mg (LOMOTIL) 2.5-0.025 mg per tablet Take 1 tablet by mouth 4 (four) times daily as needed for Diarrhea. 30 tablet 4    OLANZapine (ZYPREXA) 5 MG tablet Take 1 tablet by mouth nightly on days 1-4 of chemotherapy treatment.  Will only take for cycles 1-4. 16 tablet 0    ondansetron (ZOFRAN) 8 MG tablet Take 1 tablet (8 mg total) by mouth every 6 (six) hours as needed for Nausea. 30 tablet 1    prochlorperazine (COMPAZINE) 5 MG tablet Take 1 tablet (5 mg total) by mouth every 6 (six) hours as needed for Nausea. 30 tablet 3    promethazine (PHENERGAN) 12.5 MG Tab Take 1 tablet (12.5 mg  total) by mouth 4 (four) times daily. 45 tablet 1    albuterol (PROVENTIL/VENTOLIN HFA) 90 mcg/actuation inhaler Inhale 2 puffs into the lungs every 6 (six) hours as needed for Shortness of Breath. (Patient not taking: Reported on 10/15/2024)      b complex vitamins tablet Take 1 tablet by mouth 3 (three) times daily. (Patient not taking: Reported on 4/14/2025)      folic acid (FOLVITE) 1 MG tablet Take 1 tablet (1 mg total) by mouth once daily. 30 tablet 2    oxyCODONE-acetaminophen (PERCOCET) 5-325 mg per tablet Take 1 tablet by mouth every 6 (six) hours as needed for Pain. 30 tablet 0    pantoprazole (PROTONIX) 40 MG tablet Take 1 tablet (40 mg total) by mouth before breakfast. 30 tablet 2     No current facility-administered medications for this visit.       Review of patient's allergies indicates:  No Known Allergies      Review of systems  CONSTITUTIONAL: no fevers, no chills, no weight loss, no fatigue, no weakness  HEMATOLOGIC: no abnormal bleeding, no abnormal bruising, no drenching night sweats  ONCOLOGIC: no new masses or lumps  HEENT: no vision loss, no tinnitus or hearing loss, no nose bleeding, no dysphagia, no odynophagia  CVS: no chest pain, no palpitations, no dyspnea on exertion  RESP: no shortness of breath, no hemoptysis, no cough  GI: no nausea, no vomiting, no diarrhea, no constipation, no melena, no hematochezia, no hematemesis, no abdominal pain, no increase in abdominal girth  : no dysuria, no hematuria, no hesitancy, no scrotal swelling, no discharge  INTEGUMENT: no rashes, no abnormal bruising, no nail pitting, no hyperpigmentation  NEURO: no falls, no memory loss, no paresthesias or dysesthesias, no urofecal incontinence or retention, no loss of strength on any extremity  MSK: no back pain, no new joint pain, no joint swelling  PSYCH: no suicidal or homicidal ideation, no depression, no insomnia, no anhedonia  ENDOCRINE: no heat or cold intolerance, no polyuria, no  polydipsia      Physical Exam:  Vitals:    06/10/25 0927   BP: (!) 131/98   Pulse: 95   Resp: 14   Temp: 98 °F (36.7 °C)                     GA: AAOx3, NAD  HEENT: NCAT, moist oral mucous membranes,  CVS: s1s2 RRR, no M/R/G, PICC line in left upper extremity without surrounding edema or erythema  RESP:  Bibasilar crackles, no rhonchi.  Good bilateral air entry.  EXT: no deformities, no pedal edema, port over the right anterior thigh, without surrounding edema or erythema  SKIN: no rashes, warm and dry  NEURO: normal mentation, strength 5/5 on all 4 extremities, no sensory deficits        Assessment    # Small-cell lung cancer, probable extensive stage possible liver metastases   Reviewed pathology report and imaging studies   Discussed with patient the diagnosis of small-cell lung cancer, the aggressive nature of disease, the questionable liver involvement and likely advanced incurable stage with palliative nature of his treatment  Patient was status post 1st cycle of carbo etoposide while inpatient, cycle 1 day 1 on 05/11/2024  He was referred for port placement however surgery deemed it appropriate for a PICC line given the concern for SVC syndrome and SVC thrombus.    Patient was agreeable to proceed with palliative systemic chemotherapy with carbo etoposide atezolizumab  Patient had a hospital admission with JESUS in July 2024 and underwent a kidney biopsy following which he was transitioned to hemodialysis with Nephrology  Given that he was currently on hemodialysis, discussed the plan for dose reductions chemotherapy and the plan for chemotherapy cycle 4 day 1 to be done 12-24 hours before his dialysis.  Will discuss patient's chemotherapy dosing with pharmacy as well  Patient reports being off dialysis since late August 2024 as directed by Nephrology   PET-CT in August 2024 with positive response to therapy  Patient transitioned to maintenance atezolizumab on 09/03/2024  PET-CT in December 2024 with stable  disease, no evidence of progression.    PET-CT in April 2025 with findings concerning for progression of disease  Discussed with patient the need to transitioned to second-line therapy given progression of disease on above PET-CT.  Discussed the options for treatment at [place] with by specific antibody, patient will not be able to travel to [place] due to logistical issues.  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    # cancer-related pain   Controlled, on Percocet 5-325 q.6 hours PRN    Plan   Reviewed labs, proceed with cycle 3 day 1 lurbinectedin in today  Continue Oxycodone 5-325 q.6 hours p.r.n.   Continue Eliquis 5 BID  Will pull his PICC line today, status post port placement with surgery  Plan to follow-up in 3 weeks, labs the day prior to cycle 4 to assess treatment tolerance  Follow-up with Nephrology as scheduled.  Will plan for repeat PET-CT after 4 cycles to assess treatment response     Portions of the record may have been created with voice recognition software. Occasional wrong-word or sound-a-like substitutions may have occurred due to the inherent limitations of voice recognition software.

## 2025-06-27 DIAGNOSIS — R11.0 CHEMOTHERAPY-INDUCED NAUSEA: ICD-10-CM

## 2025-06-27 DIAGNOSIS — G89.3 CANCER RELATED PAIN: ICD-10-CM

## 2025-06-27 DIAGNOSIS — C34.81 SMALL CELL LUNG CANCER, OVERLAPPING SITES OF RIGHT LUNG: ICD-10-CM

## 2025-06-27 DIAGNOSIS — T45.1X5A CHEMOTHERAPY-INDUCED NAUSEA: ICD-10-CM

## 2025-06-27 RX ORDER — OXYCODONE AND ACETAMINOPHEN 5; 325 MG/1; MG/1
1 TABLET ORAL EVERY 6 HOURS PRN
Qty: 30 TABLET | Refills: 0 | Status: SHIPPED | OUTPATIENT
Start: 2025-06-27

## 2025-07-01 ENCOUNTER — OFFICE VISIT (OUTPATIENT)
Dept: HEMATOLOGY/ONCOLOGY | Facility: CLINIC | Age: 55
End: 2025-07-01
Payer: MEDICAID

## 2025-07-01 VITALS
HEIGHT: 66 IN | TEMPERATURE: 98 F | RESPIRATION RATE: 16 BRPM | DIASTOLIC BLOOD PRESSURE: 84 MMHG | SYSTOLIC BLOOD PRESSURE: 119 MMHG | HEART RATE: 89 BPM | OXYGEN SATURATION: 99 % | WEIGHT: 114 LBS | BODY MASS INDEX: 18.32 KG/M2

## 2025-07-01 DIAGNOSIS — C79.51 SECONDARY MALIGNANT NEOPLASM OF BONE: ICD-10-CM

## 2025-07-01 DIAGNOSIS — I87.1 SUPERIOR VENA CAVA SYNDROME: ICD-10-CM

## 2025-07-01 DIAGNOSIS — Z79.01 CURRENT USE OF LONG TERM ANTICOAGULATION: ICD-10-CM

## 2025-07-01 DIAGNOSIS — G89.3 CANCER RELATED PAIN: ICD-10-CM

## 2025-07-01 DIAGNOSIS — C34.81 SMALL CELL LUNG CANCER, OVERLAPPING SITES OF RIGHT LUNG: ICD-10-CM

## 2025-07-01 DIAGNOSIS — R19.7 DIARRHEA, UNSPECIFIED TYPE: ICD-10-CM

## 2025-07-01 DIAGNOSIS — K76.9 LIVER LESION: ICD-10-CM

## 2025-07-01 DIAGNOSIS — R11.0 CHEMOTHERAPY-INDUCED NAUSEA: Primary | ICD-10-CM

## 2025-07-01 DIAGNOSIS — T45.1X5A CHEMOTHERAPY-INDUCED NAUSEA: Primary | ICD-10-CM

## 2025-07-01 DIAGNOSIS — C34.90 MALIGNANT NEOPLASM OF UNSPECIFIED PART OF UNSPECIFIED BRONCHUS OR LUNG: ICD-10-CM

## 2025-07-01 DIAGNOSIS — Z51.11 ENCOUNTER FOR ANTINEOPLASTIC CHEMOTHERAPY: ICD-10-CM

## 2025-07-01 RX ORDER — HEPARIN 100 UNIT/ML
500 SYRINGE INTRAVENOUS
Status: CANCELLED | OUTPATIENT
Start: 2025-07-01

## 2025-07-01 RX ORDER — SODIUM CHLORIDE 0.9 % (FLUSH) 0.9 %
10 SYRINGE (ML) INJECTION
Status: CANCELLED | OUTPATIENT
Start: 2025-07-01

## 2025-07-01 NOTE — PROGRESS NOTES
Referring provider:  Dr. Campbell    Reason for consultation:  Small-cell lung cancer    Diagnosis:  Small-cell lung cancer, extensive stage.    Current treatment:    04/24/2025-current:  lurbinectedin    Treatment history:    Dates unknown:  Palliative radiation therapy for 3/5 fractions to C7.  Patient was unable to complete all treatments due to no-shows  09/03/2024-03/19/25:  Atezolizumab  05/11/2024-08/12/2024:  Carbo etoposide atezolizumab, atezolizumab was omitted on 1st cycle due to being inpatient.  Dates unknown:  Concurrent radiation therapy     HPI:    Patient is a 55-year-old male with history of alcohol use presented to the hospital at [place] with symptoms of left neck swelling and change in speech.  In the ER he would soft tissue neck CT done which revealed generalized edema throughout the soft tissue as well as right jugular vein engorgement and a large lung mass in the right lung apex concerning for malignancy.  Patient was started on Lovenox 1 milligram/kg b.i.d. 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  A CT of the abdomen and pelvis on 05/06/2024 revealed questionable metastases to the liver.  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  Patient received cycle 1 of carbo etoposide on 05/11/2024 while inpatient at [facility].  He was referred to our clinic for outpatient care and further treatment management.  He presented to clinic on 05/24/2024 to establish care.    Patient reports history of smoking, half pack per day, 6 pack of beer daily, prior history of recreational drug use.  Last use 5 years back.  He lives with a friend.  He works offshore on oil field.  Denies family history of malignancy.    He was hospitalized at [facility] in July 2024 for JESUS after cycle 3 of chemotherapy  thought to be secondary to nephrotoxic ATN versus acute interstitial nephritis and underwent a kidney biopsy.  he was on hemodialysis 3 times weekly with Nephrology which was stopped in late August 2024      Interval history:    Today, 7/1/2025, patient denies any acute concerns today.  He is still suffering with shortness of breath, will refer to pulmonology today. Denies any vision changes, focal weakness, or falls but does report decreased appetite. Weight is stable.  He reports tolerating his last cycle of treatment fairly well he denies any fevers or chills.     Pathology     05/10/2024 for our pulmonary lymph node biopsy small-cell carcinoma, grade 3 neuroendocrine carcinoma with extensive necrosis.  Lymph node pulmonary 7 L EBUS biopsy small-cell carcinoma, with extensive necrosis.    Imaging     04/04/2025 PET-CT:  Increased consolidative opacities containing air bronchograms in the right lung with mild-to-moderate uptake, possibly representing posttreatment inflammation, although the relative contribution of metabolically active tumor is difficult to determine.  Interval development of hypermetabolic pleural-based opacities in the right hemithorax, representing metastatic disease.  New hypermetabolic retroesophageal lymph node, also likely metastatic.  New hypermetabolism at C7, concerning for metabolically active disease.  Additional osteoblastic metastatic lesions remain metabolically quiescent.  Nonspecific stomach and bowel uptake      12/06/2024 PET-CT:  No significant change in size or metabolically activity of the cavitary mass in periphery of the right upper lobe.  Stable metabolically Haim osseous metastases.  Increased patchy reticular and ground-glass opacities in the right lung with mild uptake likely inflammatory in etiology.  Continued monitoring is recommended.  Physiological brown fat uptake      08/26/2024 PET-CT:  Cavitary masslike opacity in the periphery of the right upper lobe with  moderate uptake in keeping with the patient was known cancer.  Patchy ground-glass and reticular densities in the right upper lobe with mild uptake likely represents postradiation pneumonitis.  However continued monitoring is recommended.  Nonspecific mild uptake in the small precarinal lymph node that she will be monitored.  Multiple osteoblastic lesions without abnormal uptake, possibly representing treated metastases.  The cavitary lesion measures 3.8 x 3 cm with an SUV of 4.7.    06/10/24 NM Bone scan: There are multiple areas of increased uptake within the thoracic and lumbar region possible related to arthritis, however, a metastatic disease must be considered.     05/07/2024 MRI brain with and without contrast:  No obvious metastatic disease seen however large areas of cerebral convexity specifically on the right side and to a lesser extent on the left side are not well evaluated on this examination due to artifact.    05/06/24 CT Chest w/ contrast: Large mass in right lung apex right hilum and mediastinum measuring 11 cm X 9 cm X 7.2 cm causing obstruction of the superior vena cava at the junction of the brachiocephalic veins.     05/06/24 CT soft tissue neck w/ contrast- BINTA  05/06/24 CT Head/Brain w/ contrast: - BINTA  05/06/2024 CT abdomen pelvis with IV contrast:  Nonspecific hypodense lesion is identified in the liver may represent metastatic disease.  Other secondary findings as noted above.  The liver mass is 9 mm in size.      Laboratory   07/1/2025 creatinine 0.94, LFTs unremarkable, total bili 0.3, WBC count 5.12, hemoglobin 12.3, platelet count 247, ANC> 1000    06/09/2025 creatinine 0.9, LFTs unremarkable, total bili 0.4, WBC count 5.8, hemoglobin 12.4, platelet count 144, ANC> 1000    04/23/2025:  CMP unremarkable, WBC count 4.2, hemoglobin 14.1, MCV 98.6, platelet count 214.  ANC 2.39    04/23/2025:  CMP unremarkable, WBC count 6.4, hemoglobin 15.4, , platelet count 219.  ANC>  1000    04/14/2025:  CMP unremarkable, TSH 3.4, CBC unremarkable    03/18/2025 creatinine 1.0, total bili 0.1, LFTs unremarkable, TSH 2.6, alkaline phosphatase 153, WBC count 5.8, hemoglobin 14.4, MCV 1 1.9, platelet count 207, ANC> 1000, cortisol pending.    2/3/25 creatinine 1.0, LFTs unremarkable, ALK phos 135, TSH 3.52, cortisol pending, WBC count 6.3, hemoglobin 13.7, MCV 99.3, platelet count 276, ANC 4.64.    12/09/2024 creatinine 1.2, LFTs unremarkable, TSH 3.6, cortisol 13.9, WBC count 5.4, hemoglobin 14.6, MCV 98, platelet count 278, ANC 4.2.    11/17/24: cr 0.89, alb 3.9, ca 9.4, LFTs WNL, alkphos 126, wbc 4.35, hgb 15.1, plt 2257, ANC 2.86, TSH 3.1129  10/14/24: cr 1.1, alb 3.6, ca 9.6, LFTs WNL, alkphos 147, wbc 6.7, hgb 13.8, plt 216, ANC 5.11, TSH 2.25  09/02/2024 creatinine 1.1, ALK phos 142, ALT 14, , T bili 0.3, WBC count 5.6, hemoglobin 12.0, .5, platelet count 272, ANC 3.86.  09/02/2024 creatinine 1.5, LFTs unremarkable, WBC count 3.2, hemoglobin 8.8, MCV 97.5, platelet count 221.  08/12/24: cr 5.16, alb 3.5, ca 10.4, mag 2.5, phosphorus 4.3, TSH 0.968, cortisol 14.9, wbc 6.20, hgb 8.5, plt 169, ANC 5.07  07/16/24: cr 3.52, alb 2.7, ca 8.9, LFTs WNL, mag 2.0, phosphorus 3.4, cortisol 19.8, tsh 1.34, wbc 5.23, hgb 9.8, plt 195, ANC 3.86  06/24/24: cr 0.7, alb 3.3, ca 9.4, LFTs WNL, TSH 1.83, mag 2.0, phosphorus 4.1,  wbc 5.3, hgb 13.4, plt 469, cortisol pending   06/02/2024:  Creatinine 0 point 7, LFTs unremarkable, TSH 0.98, WBC count 14.6, hemoglobin 14, MCV 95.4, platelet count 267, cortisol pending.      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       Past Surgical History:   Procedure Laterality Date    CATARACT EXTRACTION W/  INTRAOCULAR LENS IMPLANT Left 06/28/2023    Dr Fritz Cornejo    ENDOBRONCHIAL ULTRASOUND N/A 05/08/2024    Dr Kayden Smart    HERNIA REPAIR  1993    INSERTION OF TUNNELED CENTRAL VENOUS HEMODIALYSIS CATHETER N/A 7/29/2024    Procedure: Insertion, Catheter, Central Venous, Hemodialysis;  Surgeon: Abel Goldstein DO;  Location: [facility] CATH LAB;  Service: Nephrology;  Laterality: N/A;    REMOVAL OF TUNNELED CENTRAL VENOUS CATHETER (CVC) N/A 9/16/2024    Procedure: REMOVAL, CATHETER, CENTRAL VENOUS, TUNNELED;  Surgeon: Abel Goldstein DO;  Location: [facility] CATH LAB;  Service: Nephrology;  Laterality: N/A;       Family History   Problem Relation Name Age of Onset    No Known Problems Mother      No Known Problems Father         Social History     Socioeconomic History    Marital status: Single   Tobacco Use    Smoking status: Some Days     Current packs/day: 0.25     Types: Cigarettes    Smokeless tobacco: Former   Substance and Sexual Activity    Alcohol use: Not Currently     Comment: 1/2 alanna daily    Drug use: Not Currently       Current Outpatient Medications   Medication Sig Dispense Refill    albuterol (PROVENTIL/VENTOLIN HFA) 90 mcg/actuation inhaler Inhale 2 puffs into the lungs every 6 (six) hours as needed for Shortness of Breath. (Patient not taking: Reported on 7/1/2025)      apixaban (ELIQUIS) 5 mg Tab Take 5 mg by mouth 2 (two) times daily.      b complex vitamins tablet Take 1 tablet by mouth 3 (three) times daily. (Patient not taking: Reported on 7/1/2025)      diphenoxylate-atropine 2.5-0.025 mg (LOMOTIL) 2.5-0.025 mg per tablet Take 1 tablet by mouth 4 (four) times daily as needed for Diarrhea. 30 tablet 4    folic acid (FOLVITE) 1 MG tablet Take 1 tablet (1 mg total) by mouth once daily. 30 tablet 2    OLANZapine (ZYPREXA) 5 MG tablet Take 1  tablet by mouth nightly on days 1-4 of chemotherapy treatment.  Will only take for cycles 1-4. 16 tablet 0    ondansetron (ZOFRAN) 8 MG tablet Take 1 tablet (8 mg total) by mouth every 6 (six) hours as needed for Nausea. 30 tablet 1    oxyCODONE-acetaminophen (PERCOCET) 5-325 mg per tablet Take 1 tablet by mouth every 6 (six) hours as needed for Pain. 30 tablet 0    pantoprazole (PROTONIX) 40 MG tablet Take 1 tablet (40 mg total) by mouth before breakfast. 30 tablet 2    prochlorperazine (COMPAZINE) 5 MG tablet Take 1 tablet (5 mg total) by mouth every 6 (six) hours as needed for Nausea. 30 tablet 3    promethazine (PHENERGAN) 12.5 MG Tab Take 1 tablet (12.5 mg total) by mouth 4 (four) times daily. 45 tablet 1     No current facility-administered medications for this visit.       Review of patient's allergies indicates:  No Known Allergies      Review of systems  CONSTITUTIONAL: no fevers, no chills, no weight loss, no fatigue, no weakness  HEMATOLOGIC: no abnormal bleeding, no abnormal bruising, no drenching night sweats  ONCOLOGIC: no new masses or lumps  HEENT: no vision loss, no tinnitus or hearing loss, no nose bleeding, no dysphagia, no odynophagia  CVS: no chest pain, no palpitations, no dyspnea on exertion  RESP: no shortness of breath, no hemoptysis, no cough  GI: no nausea, no vomiting, no diarrhea, no constipation, no melena, no hematochezia, no hematemesis, no abdominal pain, no increase in abdominal girth  : no dysuria, no hematuria, no hesitancy, no scrotal swelling, no discharge  INTEGUMENT: no rashes, no abnormal bruising, no nail pitting, no hyperpigmentation  NEURO: no falls, no memory loss, no paresthesias or dysesthesias, no urofecal incontinence or retention, no loss of strength on any extremity  MSK: no back pain, no new joint pain, no joint swelling  PSYCH: no suicidal or homicidal ideation, no depression, no insomnia, no anhedonia  ENDOCRINE: no heat or cold intolerance, no polyuria, no  polydipsia      Physical Exam:  There were no vitals filed for this visit.                    GA: AAOx3, NAD  HEENT: NCAT, moist oral mucous membranes,  CVS: s1s2 RRR, no M/R/G, PICC line in left upper extremity without surrounding edema or erythema  RESP:  Bibasilar crackles, no rhonchi.  Good bilateral air entry.  EXT: no deformities, no pedal edema, port over the right anterior thigh, without surrounding edema or erythema  SKIN: no rashes, warm and dry  NEURO: normal mentation, strength 5/5 on all 4 extremities, no sensory deficits        Assessment    # Small-cell lung cancer, probable extensive stage possible liver metastases   Reviewed pathology report and imaging studies   Discussed with patient the diagnosis of small-cell lung cancer, the aggressive nature of disease, the questionable liver involvement and likely advanced incurable stage with palliative nature of his treatment  Patient was status post 1st cycle of carbo etoposide while inpatient, cycle 1 day 1 on 05/11/2024  He was referred for port placement however surgery deemed it appropriate for a PICC line given the concern for SVC syndrome and SVC thrombus.    Patient was agreeable to proceed with palliative systemic chemotherapy with carbo etoposide atezolizumab  Patient had a hospital admission with JESUS in July 2024 and underwent a kidney biopsy following which he was transitioned to hemodialysis with Nephrology  Given that he was currently on hemodialysis, discussed the plan for dose reductions chemotherapy and the plan for chemotherapy cycle 4 day 1 to be done 12-24 hours before his dialysis.  Will discuss patient's chemotherapy dosing with pharmacy as well  Patient reports being off dialysis since late August 2024 as directed by Nephrology   PET-CT in August 2024 with positive response to therapy  Patient transitioned to maintenance atezolizumab on 09/03/2024  PET-CT in December 2024 with stable disease, no evidence of progression.    PET-CT in  April 2025 with findings concerning for progression of disease  Discussed with patient the need to transitioned to second-line therapy given progression of disease on above PET-CT.  Discussed the options for treatment at [place] with by specific antibody, patient will not be able to travel to [place] due to logistical issues.  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    # cancer-related pain   Controlled, on Percocet 5-325 q.6 hours PRN    Plan   Reviewed labs, proceed with cycle 4 day 1 lurbinectedin in today  Continue Oxycodone 5-325 q.6 hours p.r.n.   Continue Eliquis 5 BID  Plan to follow-up in 3 weeks, labs the day prior to cycle 5 to assess treatment tolerance  Refer to pulmonology for shortness of breath  Follow-up with Nephrology as scheduled.  Will order PET-CT before RTC to assess treatment response     Rosita MIRANDAP-C

## 2025-07-15 ENCOUNTER — EXTERNAL HOME HEALTH (OUTPATIENT)
Dept: HOME HEALTH SERVICES | Facility: HOSPITAL | Age: 55
End: 2025-07-15
Payer: MEDICAID

## 2025-07-18 ENCOUNTER — DOCUMENT SCAN (OUTPATIENT)
Dept: HOME HEALTH SERVICES | Facility: HOSPITAL | Age: 55
End: 2025-07-18
Payer: MEDICAID

## 2025-07-22 ENCOUNTER — OFFICE VISIT (OUTPATIENT)
Dept: HEMATOLOGY/ONCOLOGY | Facility: CLINIC | Age: 55
End: 2025-07-22
Payer: MEDICAID

## 2025-07-22 VITALS
SYSTOLIC BLOOD PRESSURE: 119 MMHG | BODY MASS INDEX: 17.66 KG/M2 | WEIGHT: 109.88 LBS | HEART RATE: 104 BPM | DIASTOLIC BLOOD PRESSURE: 86 MMHG | RESPIRATION RATE: 16 BRPM | TEMPERATURE: 98 F | HEIGHT: 66 IN | OXYGEN SATURATION: 99 %

## 2025-07-22 DIAGNOSIS — C79.51 SECONDARY MALIGNANT NEOPLASM OF BONE: ICD-10-CM

## 2025-07-22 DIAGNOSIS — C34.81 SMALL CELL LUNG CANCER, OVERLAPPING SITES OF RIGHT LUNG: Primary | ICD-10-CM

## 2025-07-22 PROCEDURE — 3074F SYST BP LT 130 MM HG: CPT | Mod: CPTII,,,

## 2025-07-22 PROCEDURE — 3008F BODY MASS INDEX DOCD: CPT | Mod: CPTII,,,

## 2025-07-22 PROCEDURE — 3079F DIAST BP 80-89 MM HG: CPT | Mod: CPTII,,,

## 2025-07-22 PROCEDURE — 99214 OFFICE O/P EST MOD 30 MIN: CPT | Mod: ,,,

## 2025-07-22 RX ORDER — HEPARIN 100 UNIT/ML
500 SYRINGE INTRAVENOUS
Status: CANCELLED | OUTPATIENT
Start: 2025-07-22

## 2025-07-22 RX ORDER — SODIUM CHLORIDE 0.9 % (FLUSH) 0.9 %
10 SYRINGE (ML) INJECTION
Status: CANCELLED | OUTPATIENT
Start: 2025-07-22

## 2025-07-22 NOTE — PROGRESS NOTES
Referring provider:  Dr. Campbell    Reason for consultation:  Small-cell lung cancer    Diagnosis:  Small-cell lung cancer, extensive stage.    Current treatment:    04/24/2025-current:  lurbinectedin    Treatment history:    Dates unknown:  Palliative radiation therapy for 3/5 fractions to C7.  Patient was unable to complete all treatments due to no-shows  09/03/2024-03/19/25:  Atezolizumab  05/11/2024-08/12/2024:  Carbo etoposide atezolizumab, atezolizumab was omitted on 1st cycle due to being inpatient.  Dates unknown:  Concurrent radiation therapy     HPI:    Patient is a 55-year-old male with history of alcohol use presented to the hospital at [place] with symptoms of left neck swelling and change in speech.  In the ER he would soft tissue neck CT done which revealed generalized edema throughout the soft tissue as well as right jugular vein engorgement and a large lung mass in the right lung apex concerning for malignancy.  Patient was started on Lovenox 1 milligram/kg b.i.d. 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  A CT of the abdomen and pelvis on 05/06/2024 revealed questionable metastases to the liver.  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  Patient received cycle 1 of carbo etoposide on 05/11/2024 while inpatient at [facility].  He was referred to our clinic for outpatient care and further treatment management.  He presented to clinic on 05/24/2024 to establish care.    Patient reports history of smoking, half pack per day, 6 pack of beer daily, prior history of recreational drug use.  Last use 5 years back.  He lives with a friend.  He works offshore on oil field.  Denies family history of malignancy.    He was hospitalized at [facility] in July 2024 for JESUS after cycle 3 of chemotherapy  thought to be secondary to nephrotoxic ATN versus acute interstitial nephritis and underwent a kidney biopsy.  he was on hemodialysis 3 times weekly with Nephrology which was stopped in late August 2024      Interval history:    Today, 7/22/2025, patient denies any acute concerns today.  Patient has PET CT on 7/24/25. Denies any vision changes, focal weakness, or falls but does report decreased appetite. Weight is decreased by 5 #'s since last follow-up.   He reports tolerating his last cycle of treatment fairly well he denies any fevers or chills.     Pathology     05/10/2024 for our pulmonary lymph node biopsy small-cell carcinoma, grade 3 neuroendocrine carcinoma with extensive necrosis.  Lymph node pulmonary 7 L EBUS biopsy small-cell carcinoma, with extensive necrosis.    Imaging     04/04/2025 PET-CT:  Increased consolidative opacities containing air bronchograms in the right lung with mild-to-moderate uptake, possibly representing posttreatment inflammation, although the relative contribution of metabolically active tumor is difficult to determine.  Interval development of hypermetabolic pleural-based opacities in the right hemithorax, representing metastatic disease.  New hypermetabolic retroesophageal lymph node, also likely metastatic.  New hypermetabolism at C7, concerning for metabolically active disease.  Additional osteoblastic metastatic lesions remain metabolically quiescent.  Nonspecific stomach and bowel uptake      12/06/2024 PET-CT:  No significant change in size or metabolically activity of the cavitary mass in periphery of the right upper lobe.  Stable metabolically [place] osseous metastases.  Increased patchy reticular and ground-glass opacities in the right lung with mild uptake likely inflammatory in etiology.  Continued monitoring is recommended.  Physiological brown fat uptake      08/26/2024 PET-CT:  Cavitary masslike opacity in the periphery of the right upper lobe with moderate uptake  in keeping with the patient was known cancer.  Patchy ground-glass and reticular densities in the right upper lobe with mild uptake likely represents postradiation pneumonitis.  However continued monitoring is recommended.  Nonspecific mild uptake in the small precarinal lymph node that she will be monitored.  Multiple osteoblastic lesions without abnormal uptake, possibly representing treated metastases.  The cavitary lesion measures 3.8 x 3 cm with an SUV of 4.7.    06/10/24 NM Bone scan: There are multiple areas of increased uptake within the thoracic and lumbar region possible related to arthritis, however, a metastatic disease must be considered.     05/07/2024 MRI brain with and without contrast:  No obvious metastatic disease seen however large areas of cerebral convexity specifically on the right side and to a lesser extent on the left side are not well evaluated on this examination due to artifact.    05/06/24 CT Chest w/ contrast: Large mass in right lung apex right hilum and mediastinum measuring 11 cm X 9 cm X 7.2 cm causing obstruction of the superior vena cava at the junction of the brachiocephalic veins.     05/06/24 CT soft tissue neck w/ contrast- BINTA  05/06/24 CT Head/Brain w/ contrast: - BINTA  05/06/2024 CT abdomen pelvis with IV contrast:  Nonspecific hypodense lesion is identified in the liver may represent metastatic disease.  Other secondary findings as noted above.  The liver mass is 9 mm in size.      Laboratory   07/22/2025 creatinine 0.90, LFTs unremarkable, total bili 0.2, magnesium 1.9, phosphorus 3.1, WBC count 3.86, hemoglobin 13.1, platelet count 236, ANC> 1000  07/1/2025 creatinine 0.94, LFTs unremarkable, total bili 0.3, WBC count 5.12, hemoglobin 12.3, platelet count 247, ANC> 1000    06/09/2025 creatinine 0.9, LFTs unremarkable, total bili 0.4, WBC count 5.8, hemoglobin 12.4, platelet count 144, ANC> 1000    04/23/2025:  CMP unremarkable, WBC count 4.2, hemoglobin 14.1, MCV 98.6,  platelet count 214.  ANC 2.39    04/23/2025:  CMP unremarkable, WBC count 6.4, hemoglobin 15.4, , platelet count 219.  ANC> 1000    04/14/2025:  CMP unremarkable, TSH 3.4, CBC unremarkable    03/18/2025 creatinine 1.0, total bili 0.1, LFTs unremarkable, TSH 2.6, alkaline phosphatase 153, WBC count 5.8, hemoglobin 14.4, MCV 1 1.9, platelet count 207, ANC> 1000, cortisol pending.    2/3/25 creatinine 1.0, LFTs unremarkable, ALK phos 135, TSH 3.52, cortisol pending, WBC count 6.3, hemoglobin 13.7, MCV 99.3, platelet count 276, ANC 4.64.    12/09/2024 creatinine 1.2, LFTs unremarkable, TSH 3.6, cortisol 13.9, WBC count 5.4, hemoglobin 14.6, MCV 98, platelet count 278, ANC 4.2.    11/17/24: cr 0.89, alb 3.9, ca 9.4, LFTs WNL, alkphos 126, wbc 4.35, hgb 15.1, plt 2257, ANC 2.86, TSH 3.1129  10/14/24: cr 1.1, alb 3.6, ca 9.6, LFTs WNL, alkphos 147, wbc 6.7, hgb 13.8, plt 216, ANC 5.11, TSH 2.25  09/02/2024 creatinine 1.1, ALK phos 142, ALT 14, , T bili 0.3, WBC count 5.6, hemoglobin 12.0, .5, platelet count 272, ANC 3.86.  09/02/2024 creatinine 1.5, LFTs unremarkable, WBC count 3.2, hemoglobin 8.8, MCV 97.5, platelet count 221.  08/12/24: cr 5.16, alb 3.5, ca 10.4, mag 2.5, phosphorus 4.3, TSH 0.968, cortisol 14.9, wbc 6.20, hgb 8.5, plt 169, ANC 5.07  07/16/24: cr 3.52, alb 2.7, ca 8.9, LFTs WNL, mag 2.0, phosphorus 3.4, cortisol 19.8, tsh 1.34, wbc 5.23, hgb 9.8, plt 195, ANC 3.86  06/24/24: cr 0.7, alb 3.3, ca 9.4, LFTs WNL, TSH 1.83, mag 2.0, phosphorus 4.1,  wbc 5.3, hgb 13.4, plt 469, cortisol pending   06/02/2024:  Creatinine 0 point 7, LFTs unremarkable, TSH 0.98, WBC count 14.6, hemoglobin 14, MCV 95.4, platelet count 267, cortisol pending.      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       Past Surgical History:   Procedure Laterality Date    CATARACT EXTRACTION W/  INTRAOCULAR LENS IMPLANT Left 06/28/2023    Dr Fritz Cornejo    ENDOBRONCHIAL ULTRASOUND N/A 05/08/2024    Dr Kayden Smart    HERNIA REPAIR  1993    INSERTION OF TUNNELED CENTRAL VENOUS HEMODIALYSIS CATHETER N/A 07/29/2024    Procedure: Insertion, Catheter, Central Venous, Hemodialysis;  Surgeon: Abel Goldstein DO;  Location: [facility] CATH LAB;  Service: Nephrology;  Laterality: N/A;    PLACEMENT, MEDIPORT Right 05/2025    leg    REMOVAL OF TUNNELED CENTRAL VENOUS CATHETER (CVC) N/A 09/16/2024    Procedure: REMOVAL, CATHETER, CENTRAL VENOUS, TUNNELED;  Surgeon: Abel Goldstein DO;  Location: [facility] CATH LAB;  Service: Nephrology;  Laterality: N/A;       Family History   Problem Relation Name Age of Onset    No Known Problems Mother      No Known Problems Father         Social History     Socioeconomic History    Marital status: Single   Tobacco Use    Smoking status: Some Days     Current packs/day: 0.25     Types: Cigarettes    Smokeless tobacco: Former   Substance and Sexual Activity    Alcohol use: Not Currently     Comment: 1/2 pint yasir hoyos daily    Drug use: Not Currently       Current Outpatient Medications   Medication Sig Dispense Refill    apixaban (ELIQUIS) 5 mg Tab Take 5 mg by mouth 2 (two) times daily.      diphenoxylate-atropine 2.5-0.025 mg (LOMOTIL) 2.5-0.025 mg per tablet Take 1 tablet by mouth 4 (four) times daily as needed for Diarrhea. 30 tablet 4    OLANZapine (ZYPREXA) 5 MG tablet Take 1 tablet by mouth nightly on days 1-4 of chemotherapy treatment.  Will only take for cycles 1-4. 16 tablet 0    ondansetron (ZOFRAN) 8 MG tablet Take 1 tablet (8 mg total) by mouth every 6 (six) hours as needed for Nausea. 30 tablet  1    oxyCODONE-acetaminophen (PERCOCET) 5-325 mg per tablet Take 1 tablet by mouth every 6 (six) hours as needed for Pain. 30 tablet 0    prochlorperazine (COMPAZINE) 5 MG tablet Take 1 tablet (5 mg total) by mouth every 6 (six) hours as needed for Nausea. 30 tablet 3    promethazine (PHENERGAN) 12.5 MG Tab Take 1 tablet (12.5 mg total) by mouth 4 (four) times daily. 45 tablet 1    albuterol (PROVENTIL/VENTOLIN HFA) 90 mcg/actuation inhaler Inhale 2 puffs into the lungs every 6 (six) hours as needed for Shortness of Breath. (Patient not taking: Reported on 10/15/2024)      b complex vitamins tablet Take 1 tablet by mouth 3 (three) times daily. (Patient not taking: Reported on 4/14/2025)      folic acid (FOLVITE) 1 MG tablet Take 1 tablet (1 mg total) by mouth once daily. 30 tablet 2    pantoprazole (PROTONIX) 40 MG tablet Take 1 tablet (40 mg total) by mouth before breakfast. 30 tablet 2     No current facility-administered medications for this visit.       Review of patient's allergies indicates:  No Known Allergies      Review of systems  CONSTITUTIONAL: no fevers, no chills, no weight loss, no fatigue, no weakness  HEMATOLOGIC: no abnormal bleeding, no abnormal bruising, no drenching night sweats  ONCOLOGIC: no new masses or lumps  HEENT: no vision loss, no tinnitus or hearing loss, no nose bleeding, no dysphagia, no odynophagia  CVS: no chest pain, no palpitations, no dyspnea on exertion  RESP: no shortness of breath, no hemoptysis, no cough  GI: no nausea, no vomiting, no diarrhea, no constipation, no melena, no hematochezia, no hematemesis, no abdominal pain, no increase in abdominal girth  : no dysuria, no hematuria, no hesitancy, no scrotal swelling, no discharge  INTEGUMENT: no rashes, no abnormal bruising, no nail pitting, no hyperpigmentation  NEURO: no falls, no memory loss, no paresthesias or dysesthesias, no urofecal incontinence or retention, no loss of strength on any extremity  MSK: no back pain,  no new joint pain, no joint swelling  PSYCH: no suicidal or homicidal ideation, no depression, no insomnia, no anhedonia  ENDOCRINE: no heat or cold intolerance, no polyuria, no polydipsia      Physical Exam:  Vitals:    07/22/25 0955   BP: 119/86   Pulse: 104   Resp: 16   Temp: 97.6 °F (36.4 °C)                       GA: AAOx3, NAD  HEENT: NCAT, moist oral mucous membranes,  CVS: s1s2 RRR, no M/R/G, PICC line in left upper extremity without surrounding edema or erythema  RESP:  Bibasilar crackles, no rhonchi.  Good bilateral air entry.  EXT: no deformities, no pedal edema, port over the right anterior thigh, without surrounding edema or erythema  SKIN: no rashes, warm and dry  NEURO: normal mentation, strength 5/5 on all 4 extremities, no sensory deficits        Assessment    # Small-cell lung cancer, probable extensive stage possible liver metastases   Reviewed pathology report and imaging studies   Discussed with patient the diagnosis of small-cell lung cancer, the aggressive nature of disease, the questionable liver involvement and likely advanced incurable stage with palliative nature of his treatment  Patient was status post 1st cycle of carbo etoposide while inpatient, cycle 1 day 1 on 05/11/2024  He was referred for port placement however surgery deemed it appropriate for a PICC line given the concern for SVC syndrome and SVC thrombus.    Patient was agreeable to proceed with palliative systemic chemotherapy with carbo etoposide atezolizumab  Patient had a hospital admission with JESUS in July 2024 and underwent a kidney biopsy following which he was transitioned to hemodialysis with Nephrology  Given that he was currently on hemodialysis, discussed the plan for dose reductions chemotherapy and the plan for chemotherapy cycle 4 day 1 to be done 12-24 hours before his dialysis.  Will discuss patient's chemotherapy dosing with pharmacy as well  Patient reports being off dialysis since late August 2024 as  directed by Nephrology   PET-CT in August 2024 with positive response to therapy  Patient transitioned to maintenance atezolizumab on 09/03/2024  PET-CT in December 2024 with stable disease, no evidence of progression.    PET-CT in April 2025 with findings concerning for progression of disease  Discussed with patient the need to transitioned to second-line therapy given progression of disease on above PET-CT.  Discussed the options for treatment at [place] with by specific antibody, patient will not be able to travel to [place] due to logistical issues.  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    # cancer-related pain   Controlled, on Percocet 5-325 q.6 hours PRN    Plan   Reviewed labs, proceed with cycle 5 day 1 lurbinectedin in today  Continue Oxycodone 5-325 q.6 hours p.r.n.   Continue Eliquis 5 BID  Plan to follow-up in 3 weeks, labs the day prior to cycle 6 to assess treatment tolerance  Follow-up with pulmonology for shortness of breath  Follow-up with Nephrology as scheduled.  Follow-up with PET-CT on 07/24/25         (3) No effort against gravity; leg falls to bed immediately

## 2025-07-24 ENCOUNTER — DOCUMENT SCAN (OUTPATIENT)
Dept: HOME HEALTH SERVICES | Facility: HOSPITAL | Age: 55
End: 2025-07-24
Payer: MEDICAID

## 2025-08-01 DIAGNOSIS — C34.81 SMALL CELL LUNG CANCER, OVERLAPPING SITES OF RIGHT LUNG: ICD-10-CM

## 2025-08-01 DIAGNOSIS — G89.3 CANCER RELATED PAIN: ICD-10-CM

## 2025-08-04 RX ORDER — OXYCODONE AND ACETAMINOPHEN 5; 325 MG/1; MG/1
1 TABLET ORAL EVERY 6 HOURS PRN
Qty: 30 TABLET | Refills: 0 | Status: SHIPPED | OUTPATIENT
Start: 2025-08-04

## 2025-08-12 ENCOUNTER — OFFICE VISIT (OUTPATIENT)
Dept: HEMATOLOGY/ONCOLOGY | Facility: CLINIC | Age: 55
End: 2025-08-12
Payer: MEDICAID

## 2025-08-12 VITALS
OXYGEN SATURATION: 99 % | HEIGHT: 66 IN | HEART RATE: 119 BPM | RESPIRATION RATE: 14 BRPM | DIASTOLIC BLOOD PRESSURE: 88 MMHG | BODY MASS INDEX: 18.14 KG/M2 | TEMPERATURE: 98 F | WEIGHT: 112.88 LBS | SYSTOLIC BLOOD PRESSURE: 119 MMHG

## 2025-08-12 DIAGNOSIS — C79.51 SECONDARY MALIGNANT NEOPLASM OF BONE: ICD-10-CM

## 2025-08-12 DIAGNOSIS — Z51.11 ENCOUNTER FOR ANTINEOPLASTIC CHEMOTHERAPY: ICD-10-CM

## 2025-08-12 DIAGNOSIS — Z79.01 CURRENT USE OF LONG TERM ANTICOAGULATION: ICD-10-CM

## 2025-08-12 DIAGNOSIS — R19.7 DIARRHEA, UNSPECIFIED TYPE: ICD-10-CM

## 2025-08-12 DIAGNOSIS — G89.3 CANCER RELATED PAIN: ICD-10-CM

## 2025-08-12 DIAGNOSIS — C34.81 SMALL CELL LUNG CANCER, OVERLAPPING SITES OF RIGHT LUNG: Primary | ICD-10-CM

## 2025-08-12 DIAGNOSIS — I87.1 SUPERIOR VENA CAVA SYNDROME: ICD-10-CM

## 2025-08-12 DIAGNOSIS — K76.9 LIVER LESION: ICD-10-CM

## 2025-08-12 RX ORDER — HEPARIN 100 UNIT/ML
500 SYRINGE INTRAVENOUS
Status: CANCELLED | OUTPATIENT
Start: 2025-08-12

## 2025-08-12 RX ORDER — SODIUM CHLORIDE 0.9 % (FLUSH) 0.9 %
10 SYRINGE (ML) INJECTION
Status: CANCELLED | OUTPATIENT
Start: 2025-08-12

## 2025-08-20 ENCOUNTER — DOCUMENT SCAN (OUTPATIENT)
Dept: HOME HEALTH SERVICES | Facility: HOSPITAL | Age: 55
End: 2025-08-20
Payer: MEDICAID

## 2025-08-28 ENCOUNTER — TELEPHONE (OUTPATIENT)
Dept: HEMATOLOGY/ONCOLOGY | Facility: CLINIC | Age: 55
End: 2025-08-28
Payer: MEDICAID

## (undated) DEVICE — NDL BIOPSY ASPIRATION 21G

## (undated) DEVICE — TRAY CENT PREM SUT REM PK SGL

## (undated) DEVICE — TOWEL OR DISP STRL BLUE 4/PK

## (undated) DEVICE — SUT MONOCRYL 3-0 PS-2 UND

## (undated) DEVICE — LMA I-GEL  4.0 ADULT MD 50-90

## (undated) DEVICE — CATH BRONCHOSCOPE F/BF

## (undated) DEVICE — SPONGE COTTON TRAY 4X4IN

## (undated) DEVICE — SOLIDIFIER BOTTLE 1500CC

## (undated) DEVICE — TUBING AIRLIFE O2 VINYL 7FT

## (undated) DEVICE — ATOMIZER NASAL DRUG DEL NO SYR

## (undated) DEVICE — KIT MINI STK MAX COAX 5FR 10CM

## (undated) DEVICE — VALVE OLYMPUS SCOPE SUCTION

## (undated) DEVICE — ADHESIVE DERMABOND ADVANCED

## (undated) DEVICE — TIP SUCTION YANKAUER

## (undated) DEVICE — DRESSING TRANS 4X4 TEGADERM

## (undated) DEVICE — NDL SYR 10ML 18X1.5 LL BLUNT

## (undated) DEVICE — SYR DISP LL 5CC

## (undated) DEVICE — NEBULIZER MISY FAST 02 TUB 7FT

## (undated) DEVICE — VALVE OLYMPUS SCOPE BIOPSY

## (undated) DEVICE — BRUSH CLEANING EBUS SMALL

## (undated) DEVICE — SYR SLIP TIP 10ML SHIELD

## (undated) DEVICE — Device

## (undated) DEVICE — NDL HYPO REG 25G X 1 1/2

## (undated) DEVICE — ADAPTER NUT NIPPLE

## (undated) DEVICE — SUT SILK 0 BLK BR CT-1 30IN

## (undated) DEVICE — ADAPTER SWIVEL

## (undated) DEVICE — GLOVE PROTEXIS NEOPRN SZ8

## (undated) DEVICE — KIT CLN RIVITAL-OX ENDOSCP

## (undated) DEVICE — COVER PROBE US 5.5X58L NON LTX

## (undated) DEVICE — SOL IRR SOD CHL .9% POUR

## (undated) DEVICE — CONNECTOR TUBING OXYGEN

## (undated) DEVICE — TUBING SUC MEDI-VAC CONN 6FT

## (undated) DEVICE — SYR ONLY LEUR TIP 30CC

## (undated) DEVICE — ELECTRODE RED DOT EKG

## (undated) DEVICE — STOPCOCK DISCOFIX 3 WAY

## (undated) DEVICE — DRESSING TEGADERM CHG 3.5X4.5

## (undated) DEVICE — SYR ONLY LUER LOCK 20CC

## (undated) DEVICE — KIT CANIST SUCTION 1200CC

## (undated) DEVICE — BRUSH CLEANING 1-1.5X950MM

## (undated) DEVICE — SET LEADWIRE PINCH 3 LD 50IN

## (undated) DEVICE — CANNULA NASAL ADLT EAR 25FT

## (undated) DEVICE — FLOWSWITCH HP 1-W W/O LL

## (undated) DEVICE — FORCEP HEMOSTAT MOSQUITO STR

## (undated) DEVICE — BOWL STERILE LARGE 32OZ

## (undated) DEVICE — CHLORAPREP TNT2%SOL10.5ML TEAL

## (undated) DEVICE — WRAP STERILIZATION 45X45 DISP

## (undated) DEVICE — DRESSING QUIKCLOT 1.5X1.5FT

## (undated) DEVICE — JELLY LUBE FOIL PK STER 2.7GR